# Patient Record
Sex: FEMALE | Race: WHITE | NOT HISPANIC OR LATINO | Employment: FULL TIME | URBAN - NONMETROPOLITAN AREA
[De-identification: names, ages, dates, MRNs, and addresses within clinical notes are randomized per-mention and may not be internally consistent; named-entity substitution may affect disease eponyms.]

---

## 2017-04-01 ENCOUNTER — HOSPITAL ENCOUNTER (EMERGENCY)
Facility: HOSPITAL | Age: 27
Discharge: HOME | End: 2017-04-01
Attending: EMERGENCY MEDICINE | Admitting: EMERGENCY MEDICINE

## 2017-04-01 VITALS
OXYGEN SATURATION: 100 % | SYSTOLIC BLOOD PRESSURE: 115 MMHG | HEIGHT: 66 IN | HEART RATE: 98 BPM | TEMPERATURE: 98.5 F | WEIGHT: 140 LBS | DIASTOLIC BLOOD PRESSURE: 79 MMHG | RESPIRATION RATE: 14 BRPM | BODY MASS INDEX: 22.5 KG/M2

## 2017-04-01 DIAGNOSIS — R74.8 ELEVATED LIVER ENZYMES: ICD-10-CM

## 2017-04-01 DIAGNOSIS — N10 ACUTE PYELONEPHRITIS: Primary | ICD-10-CM

## 2017-04-01 LAB
ALBUMIN SERPL-MCNC: 4.3 G/DL (ref 3.5–5)
ALBUMIN/GLOB SERPL: 1.1 G/DL (ref 1–2)
ALP SERPL-CCNC: 185 U/L (ref 38–126)
ALT SERPL W P-5'-P-CCNC: 327 U/L (ref 13–69)
ANION GAP SERPL CALCULATED.3IONS-SCNC: 14.1 MMOL/L
AST SERPL-CCNC: 203 U/L (ref 15–46)
B-HCG UR QL: NEGATIVE
BACTERIA UR QL AUTO: ABNORMAL /HPF
BASOPHILS # BLD AUTO: 0.04 10*3/MM3 (ref 0–0.2)
BASOPHILS NFR BLD AUTO: 0.3 % (ref 0–2.5)
BILIRUB SERPL-MCNC: 0.9 MG/DL (ref 0.2–1.3)
BILIRUB UR QL STRIP: NEGATIVE
BUN BLD-MCNC: 11 MG/DL (ref 7–20)
BUN/CREAT SERPL: 18.3 (ref 7.1–23.5)
CALCIUM SPEC-SCNC: 9.4 MG/DL (ref 8.4–10.2)
CHLORIDE SERPL-SCNC: 100 MMOL/L (ref 98–107)
CLARITY UR: ABNORMAL
CO2 SERPL-SCNC: 28 MMOL/L (ref 26–30)
COLOR UR: ABNORMAL
CREAT BLD-MCNC: 0.6 MG/DL (ref 0.6–1.3)
DEPRECATED RDW RBC AUTO: 45.7 FL (ref 37–54)
EOSINOPHIL # BLD AUTO: 0.02 10*3/MM3 (ref 0–0.7)
EOSINOPHIL NFR BLD AUTO: 0.2 % (ref 0–7)
ERYTHROCYTE [DISTWIDTH] IN BLOOD BY AUTOMATED COUNT: 14.8 % (ref 11.5–14.5)
GFR SERPL CREATININE-BSD FRML MDRD: 122 ML/MIN/1.73
GLOBULIN UR ELPH-MCNC: 3.8 GM/DL
GLUCOSE BLD-MCNC: 97 MG/DL (ref 74–98)
GLUCOSE UR STRIP-MCNC: ABNORMAL MG/DL
HCT VFR BLD AUTO: 43.6 % (ref 37–47)
HGB BLD-MCNC: 14.4 G/DL (ref 12–16)
HGB UR QL STRIP.AUTO: ABNORMAL
HYALINE CASTS UR QL AUTO: ABNORMAL /LPF
IMM GRANULOCYTES # BLD: 0.05 10*3/MM3 (ref 0–0.06)
IMM GRANULOCYTES NFR BLD: 0.4 % (ref 0–0.6)
KETONES UR QL STRIP: NEGATIVE
LEUKOCYTE ESTERASE UR QL STRIP.AUTO: ABNORMAL
LIPASE SERPL-CCNC: 69 U/L (ref 23–300)
LYMPHOCYTES # BLD AUTO: 2.12 10*3/MM3 (ref 0.6–3.4)
LYMPHOCYTES NFR BLD AUTO: 18.1 % (ref 10–50)
MCH RBC QN AUTO: 27.9 PG (ref 27–31)
MCHC RBC AUTO-ENTMCNC: 33 G/DL (ref 30–37)
MCV RBC AUTO: 84.3 FL (ref 81–99)
MONOCYTES # BLD AUTO: 1.06 10*3/MM3 (ref 0–0.9)
MONOCYTES NFR BLD AUTO: 9 % (ref 0–12)
NEUTROPHILS # BLD AUTO: 8.44 10*3/MM3 (ref 2–6.9)
NEUTROPHILS NFR BLD AUTO: 72 % (ref 37–80)
NITRITE UR QL STRIP: POSITIVE
NRBC BLD MANUAL-RTO: 0 /100 WBC (ref 0–0)
PH UR STRIP.AUTO: <=5 [PH] (ref 5–8)
PLATELET # BLD AUTO: 191 10*3/MM3 (ref 130–400)
PMV BLD AUTO: 10.1 FL (ref 6–12)
POTASSIUM BLD-SCNC: 4.1 MMOL/L (ref 3.5–5.1)
PROT SERPL-MCNC: 8.1 G/DL (ref 6.3–8.2)
PROT UR QL STRIP: ABNORMAL
RBC # BLD AUTO: 5.17 10*6/MM3 (ref 4.2–5.4)
RBC # UR: ABNORMAL /HPF
REF LAB TEST METHOD: ABNORMAL
SODIUM BLD-SCNC: 138 MMOL/L (ref 137–145)
SP GR UR STRIP: <=1.005 (ref 1–1.03)
SQUAMOUS #/AREA URNS HPF: ABNORMAL /HPF
UROBILINOGEN UR QL STRIP: ABNORMAL
WBC CLUMPS # UR AUTO: ABNORMAL /HPF
WBC NRBC COR # BLD: 11.73 10*3/MM3 (ref 4.8–10.8)
WBC UR QL AUTO: ABNORMAL /HPF

## 2017-04-01 PROCEDURE — 87186 SC STD MICRODIL/AGAR DIL: CPT | Performed by: PHYSICIAN ASSISTANT

## 2017-04-01 PROCEDURE — 81001 URINALYSIS AUTO W/SCOPE: CPT | Performed by: PHYSICIAN ASSISTANT

## 2017-04-01 PROCEDURE — 96375 TX/PRO/DX INJ NEW DRUG ADDON: CPT

## 2017-04-01 PROCEDURE — 87077 CULTURE AEROBIC IDENTIFY: CPT | Performed by: PHYSICIAN ASSISTANT

## 2017-04-01 PROCEDURE — 87086 URINE CULTURE/COLONY COUNT: CPT | Performed by: PHYSICIAN ASSISTANT

## 2017-04-01 PROCEDURE — 83690 ASSAY OF LIPASE: CPT | Performed by: PHYSICIAN ASSISTANT

## 2017-04-01 PROCEDURE — 96365 THER/PROPH/DIAG IV INF INIT: CPT

## 2017-04-01 PROCEDURE — 99284 EMERGENCY DEPT VISIT MOD MDM: CPT

## 2017-04-01 PROCEDURE — 96361 HYDRATE IV INFUSION ADD-ON: CPT

## 2017-04-01 PROCEDURE — 80053 COMPREHEN METABOLIC PANEL: CPT | Performed by: PHYSICIAN ASSISTANT

## 2017-04-01 PROCEDURE — 85025 COMPLETE CBC W/AUTO DIFF WBC: CPT | Performed by: PHYSICIAN ASSISTANT

## 2017-04-01 PROCEDURE — 25010000002 KETOROLAC TROMETHAMINE PER 15 MG: Performed by: PHYSICIAN ASSISTANT

## 2017-04-01 PROCEDURE — 25010000002 CEFTRIAXONE: Performed by: PHYSICIAN ASSISTANT

## 2017-04-01 PROCEDURE — 81025 URINE PREGNANCY TEST: CPT | Performed by: PHYSICIAN ASSISTANT

## 2017-04-01 RX ORDER — IBUPROFEN 600 MG/1
600 TABLET ORAL EVERY 6 HOURS PRN
Qty: 20 TABLET | Refills: 0 | Status: ON HOLD | OUTPATIENT
Start: 2017-04-01 | End: 2017-07-02

## 2017-04-01 RX ORDER — NAPROXEN 500 MG/1
500 TABLET ORAL 2 TIMES DAILY PRN
Qty: 14 TABLET | Refills: 0 | Status: SHIPPED | OUTPATIENT
Start: 2017-04-01 | End: 2017-04-01 | Stop reason: HOSPADM

## 2017-04-01 RX ORDER — KETOROLAC TROMETHAMINE 30 MG/ML
60 INJECTION, SOLUTION INTRAMUSCULAR; INTRAVENOUS ONCE
Status: DISCONTINUED | OUTPATIENT
Start: 2017-04-01 | End: 2017-04-01

## 2017-04-01 RX ORDER — KETOROLAC TROMETHAMINE 30 MG/ML
30 INJECTION, SOLUTION INTRAMUSCULAR; INTRAVENOUS ONCE
Status: COMPLETED | OUTPATIENT
Start: 2017-04-01 | End: 2017-04-01

## 2017-04-01 RX ORDER — ONDANSETRON 4 MG/1
4 TABLET, ORALLY DISINTEGRATING ORAL EVERY 6 HOURS PRN
Qty: 10 TABLET | Refills: 0 | Status: ON HOLD | OUTPATIENT
Start: 2017-04-01 | End: 2017-07-02

## 2017-04-01 RX ORDER — CIPROFLOXACIN 500 MG/1
500 TABLET, FILM COATED ORAL EVERY 12 HOURS
Qty: 20 TABLET | Refills: 0 | Status: ON HOLD | OUTPATIENT
Start: 2017-04-01 | End: 2017-07-02

## 2017-04-01 RX ORDER — ACETAMINOPHEN 500 MG
1000 TABLET ORAL ONCE
Status: COMPLETED | OUTPATIENT
Start: 2017-04-01 | End: 2017-04-01

## 2017-04-01 RX ORDER — PHENAZOPYRIDINE HYDROCHLORIDE 200 MG/1
200 TABLET, FILM COATED ORAL 3 TIMES DAILY PRN
Qty: 5 TABLET | Refills: 0 | Status: ON HOLD | OUTPATIENT
Start: 2017-04-01 | End: 2017-07-02

## 2017-04-01 RX ORDER — SODIUM CHLORIDE 0.9 % (FLUSH) 0.9 %
10 SYRINGE (ML) INJECTION AS NEEDED
Status: DISCONTINUED | OUTPATIENT
Start: 2017-04-01 | End: 2017-04-02 | Stop reason: HOSPADM

## 2017-04-01 RX ORDER — ONDANSETRON 4 MG/1
4 TABLET, ORALLY DISINTEGRATING ORAL ONCE
Status: COMPLETED | OUTPATIENT
Start: 2017-04-01 | End: 2017-04-01

## 2017-04-01 RX ADMIN — ONDANSETRON 4 MG: 4 TABLET, ORALLY DISINTEGRATING ORAL at 21:04

## 2017-04-01 RX ADMIN — SODIUM CHLORIDE 1000 ML: 9 INJECTION, SOLUTION INTRAVENOUS at 21:03

## 2017-04-01 RX ADMIN — KETOROLAC TROMETHAMINE 30 MG: 30 INJECTION, SOLUTION INTRAMUSCULAR at 21:11

## 2017-04-01 RX ADMIN — ACETAMINOPHEN 1000 MG: 500 TABLET, COATED ORAL at 21:03

## 2017-04-01 RX ADMIN — CEFTRIAXONE 1 G: 1 INJECTION, POWDER, FOR SOLUTION INTRAMUSCULAR; INTRAVENOUS at 21:55

## 2017-04-02 LAB
HOLD SPECIMEN: NORMAL
WHOLE BLOOD HOLD SPECIMEN: NORMAL

## 2017-04-02 NOTE — ED PROVIDER NOTES
Subjective   HPI Comments: 26-year-old female here with progressively worsening severe, bilateral sharp flank pain for one week.  Radiating to the suprapubic area.  Also has dysuria, frequency, nausea.  No vomiting.  Reported fever to 102.4 earlier today.  Aggravating factors: Urinating.  Alleviating factors: Ibuprofen and Pyridium.  Treatment prior to arrival: Pyridium and ibuprofen.  Remote history of a right kidney stone.  Had pyelonephritis 2 years ago requiring hospitalization.  LMP 3 weeks ago.  Denies pregnancy.  Status post tubal ligation.  No vaginal discharge.      History provided by:  Patient  History limited by: nothing.   used: No        Review of Systems   Constitutional: Positive for chills and fever.   HENT: Negative.    Eyes: Negative.    Respiratory: Negative.    Cardiovascular: Negative.  Negative for chest pain.   Gastrointestinal: Positive for abdominal pain, nausea and vomiting.   Endocrine: Negative.    Genitourinary: Positive for difficulty urinating, dysuria, flank pain and urgency.   Skin: Negative.    Allergic/Immunologic: Negative.    Neurological: Negative.    Hematological: Negative.    Psychiatric/Behavioral: Negative.    All other systems reviewed and are negative.      History reviewed. No pertinent past medical history.    No Known Allergies    Past Surgical History:   Procedure Laterality Date   • CERCLAGE CERVIX     • D&C HYSTEROSCOPY         History reviewed. No pertinent family history.    Social History     Social History   • Marital status: Single     Spouse name: N/A   • Number of children: N/A   • Years of education: N/A     Social History Main Topics   • Smoking status: Current Every Day Smoker   • Smokeless tobacco: None   • Alcohol use No   • Drug use: No   • Sexual activity: Not Asked     Other Topics Concern   • None     Social History Narrative   • None           Objective   Physical Exam   Constitutional: She is oriented to person, place, and  time. She appears well-developed and well-nourished. No distress.   HENT:   Head: Normocephalic and atraumatic.   Right Ear: External ear normal.   Left Ear: External ear normal.   Eyes: EOM are normal. Pupils are equal, round, and reactive to light.   Neck: Normal range of motion. Neck supple.   Cardiovascular: Normal rate, regular rhythm and normal heart sounds.    Pulmonary/Chest: Effort normal and breath sounds normal. No stridor. She has no wheezes. She exhibits no tenderness.   Abdominal: Soft. She exhibits no distension. There is tenderness (suprapubic area is tender to palpation.  Bilateral CVA tenderness, right greater than left.). There is no rebound and no guarding.   Musculoskeletal: Normal range of motion. She exhibits no edema.   Neurological: She is alert and oriented to person, place, and time.   Skin: Skin is warm and dry. No rash noted. She is not diaphoretic.   Psychiatric: She has a normal mood and affect. Her behavior is normal. Judgment and thought content normal.   Nursing note and vitals reviewed.      Procedures         ED Course  ED Course   Comment By Time   Patient's pain is much better at this time.  Clinical history and laboratory evaluation is suggestive of pyelonephritis.  Liver enzymes are noted to be elevated.  The patient does state that she drinks a moderate amount of alcohol.  No IV drug use.  I explained toher that she will need to see her doctor for workup of her elevated liver enzymes.  Stop drinking, wean off alcohol.  I explained to her that her family doctor may need to check her for hepatitis.  She understands that she needs a workup through her regular doctor. Codi Moncada PA-C 04/01 2206                  MDM  Number of Diagnoses or Management Options  Acute pyelonephritis: new and requires workup  Elevated liver enzymes: new and requires workup     Amount and/or Complexity of Data Reviewed  Clinical lab tests: reviewed and ordered  Discuss the patient with other  providers: yes    Risk of Complications, Morbidity, and/or Mortality  Presenting problems: moderate  Diagnostic procedures: low  Management options: moderate    Patient Progress  Patient progress: stable      Final diagnoses:   Acute pyelonephritis   Elevated liver enzymes            Codi Moncada PA-C  04/01/17 2805

## 2017-04-02 NOTE — DISCHARGE INSTRUCTIONS
Increase fluids, lots of cranberry juice.    Return to the ER for uncontrolled vomiting, new or worsening symptoms.  Wean yourself off alcohol.  Follow-up with your doctor in 2-3 days for recheck of your liver enzymes and for workup as to why they're elevated.  If you do not have a family DrZhane, follow-up with the [place] clinic.  Call for appointment.

## 2017-04-04 LAB — BACTERIA SPEC AEROBE CULT: ABNORMAL

## 2017-07-01 ENCOUNTER — HOSPITAL ENCOUNTER (EMERGENCY)
Facility: HOSPITAL | Age: 27
Discharge: PSYCHIATRIC OR SUBSTANCE USE FACILITY | End: 2017-07-02
Attending: EMERGENCY MEDICINE | Admitting: EMERGENCY MEDICINE

## 2017-07-01 DIAGNOSIS — T40.1X2A INTENTIONAL HEROIN OVERDOSE, INITIAL ENCOUNTER (HCC): Primary | ICD-10-CM

## 2017-07-01 DIAGNOSIS — T14.91XA SUICIDE ATTEMPT (HCC): ICD-10-CM

## 2017-07-01 LAB
ALBUMIN SERPL-MCNC: 4.8 G/DL (ref 3.5–5)
ALBUMIN/GLOB SERPL: 1.3 G/DL (ref 1–2)
ALP SERPL-CCNC: 75 U/L (ref 38–126)
ALT SERPL W P-5'-P-CCNC: 45 U/L (ref 13–69)
ANION GAP SERPL CALCULATED.3IONS-SCNC: 15.1 MMOL/L
APAP SERPL-MCNC: <10 MCG/ML
AST SERPL-CCNC: 48 U/L (ref 15–46)
BASOPHILS # BLD AUTO: 0.04 10*3/MM3 (ref 0–0.2)
BASOPHILS NFR BLD AUTO: 0.4 % (ref 0–2.5)
BILIRUB SERPL-MCNC: 1.1 MG/DL (ref 0.2–1.3)
BUN BLD-MCNC: 20 MG/DL (ref 7–20)
BUN/CREAT SERPL: 22.2 (ref 7.1–23.5)
CALCIUM SPEC-SCNC: 10 MG/DL (ref 8.4–10.2)
CHLORIDE SERPL-SCNC: 102 MMOL/L (ref 98–107)
CO2 SERPL-SCNC: 27 MMOL/L (ref 26–30)
CREAT BLD-MCNC: 0.9 MG/DL (ref 0.6–1.3)
DEPRECATED RDW RBC AUTO: 40.8 FL (ref 37–54)
EOSINOPHIL # BLD AUTO: 0.06 10*3/MM3 (ref 0–0.7)
EOSINOPHIL NFR BLD AUTO: 0.6 % (ref 0–7)
ERYTHROCYTE [DISTWIDTH] IN BLOOD BY AUTOMATED COUNT: 13.3 % (ref 11.5–14.5)
ETHANOL BLD-MCNC: <10 MG/DL
ETHANOL UR QL: <0.01 %
GFR SERPL CREATININE-BSD FRML MDRD: 76 ML/MIN/1.73
GLOBULIN UR ELPH-MCNC: 3.6 GM/DL
GLUCOSE BLD-MCNC: 126 MG/DL (ref 74–98)
HCT VFR BLD AUTO: 44.4 % (ref 37–47)
HGB BLD-MCNC: 14.7 G/DL (ref 12–16)
IMM GRANULOCYTES # BLD: 0.03 10*3/MM3 (ref 0–0.06)
IMM GRANULOCYTES NFR BLD: 0.3 % (ref 0–0.6)
LYMPHOCYTES # BLD AUTO: 4.23 10*3/MM3 (ref 0.6–3.4)
LYMPHOCYTES NFR BLD AUTO: 42.9 % (ref 10–50)
MAGNESIUM SERPL-MCNC: 2.1 MG/DL (ref 1.6–2.3)
MCH RBC QN AUTO: 27.8 PG (ref 27–31)
MCHC RBC AUTO-ENTMCNC: 33.1 G/DL (ref 30–37)
MCV RBC AUTO: 83.9 FL (ref 81–99)
MONOCYTES # BLD AUTO: 0.89 10*3/MM3 (ref 0–0.9)
MONOCYTES NFR BLD AUTO: 9 % (ref 0–12)
NEUTROPHILS # BLD AUTO: 4.6 10*3/MM3 (ref 2–6.9)
NEUTROPHILS NFR BLD AUTO: 46.8 % (ref 37–80)
NRBC BLD MANUAL-RTO: 0 /100 WBC (ref 0–0)
PLATELET # BLD AUTO: 256 10*3/MM3 (ref 130–400)
PMV BLD AUTO: 9.7 FL (ref 6–12)
POTASSIUM BLD-SCNC: 3.1 MMOL/L (ref 3.5–5.1)
PROT SERPL-MCNC: 8.4 G/DL (ref 6.3–8.2)
RBC # BLD AUTO: 5.29 10*6/MM3 (ref 4.2–5.4)
SALICYLATES SERPL-MCNC: <1 MG/DL (ref 2.8–20)
SODIUM BLD-SCNC: 141 MMOL/L (ref 137–145)
WBC NRBC COR # BLD: 9.85 10*3/MM3 (ref 4.8–10.8)

## 2017-07-01 PROCEDURE — 85025 COMPLETE CBC W/AUTO DIFF WBC: CPT | Performed by: EMERGENCY MEDICINE

## 2017-07-01 PROCEDURE — 80307 DRUG TEST PRSMV CHEM ANLYZR: CPT

## 2017-07-01 PROCEDURE — 80053 COMPREHEN METABOLIC PANEL: CPT | Performed by: EMERGENCY MEDICINE

## 2017-07-01 PROCEDURE — 80307 DRUG TEST PRSMV CHEM ANLYZR: CPT | Performed by: EMERGENCY MEDICINE

## 2017-07-01 PROCEDURE — 99285 EMERGENCY DEPT VISIT HI MDM: CPT

## 2017-07-01 PROCEDURE — 83735 ASSAY OF MAGNESIUM: CPT

## 2017-07-02 ENCOUNTER — HOSPITAL ENCOUNTER (INPATIENT)
Facility: HOSPITAL | Age: 27
LOS: 4 days | Discharge: HOME | End: 2017-07-06
Attending: PSYCHIATRY & NEUROLOGY | Admitting: PSYCHIATRY & NEUROLOGY

## 2017-07-02 VITALS
DIASTOLIC BLOOD PRESSURE: 72 MMHG | OXYGEN SATURATION: 97 % | TEMPERATURE: 98.4 F | SYSTOLIC BLOOD PRESSURE: 113 MMHG | RESPIRATION RATE: 22 BRPM | WEIGHT: 140 LBS | HEART RATE: 82 BPM | BODY MASS INDEX: 22.5 KG/M2 | HEIGHT: 66 IN

## 2017-07-02 PROBLEM — F32.2 SEVERE MAJOR DEPRESSION (HCC): Status: ACTIVE | Noted: 2017-07-02

## 2017-07-02 LAB
AMPHET+METHAMPHET UR QL: POSITIVE
AMPHETAMINES UR QL: POSITIVE
B-HCG UR QL: NEGATIVE
BACTERIA UR QL AUTO: ABNORMAL /HPF
BARBITURATES UR QL SCN: NEGATIVE
BENZODIAZ UR QL SCN: NEGATIVE
BILIRUB UR QL STRIP: NEGATIVE
BUPRENORPHINE SERPL-MCNC: NEGATIVE NG/ML
CANNABINOIDS SERPL QL: POSITIVE
CLARITY UR: ABNORMAL
COCAINE UR QL: NEGATIVE
COLOR UR: YELLOW
GLUCOSE UR STRIP-MCNC: NEGATIVE MG/DL
HGB UR QL STRIP.AUTO: ABNORMAL
HYALINE CASTS UR QL AUTO: ABNORMAL /LPF
KETONES UR QL STRIP: ABNORMAL
LEUKOCYTE ESTERASE UR QL STRIP.AUTO: ABNORMAL
METHADONE UR QL SCN: NEGATIVE
MUCOUS THREADS URNS QL MICRO: ABNORMAL /HPF
NITRITE UR QL STRIP: POSITIVE
OPIATES UR QL: NEGATIVE
OXYCODONE UR QL SCN: NEGATIVE
PCP UR QL SCN: NEGATIVE
PH UR STRIP.AUTO: 5.5 [PH] (ref 5–8)
POTASSIUM BLD-SCNC: 4.5 MMOL/L (ref 3.5–5.1)
PROPOXYPH UR QL: NEGATIVE
PROT UR QL STRIP: ABNORMAL
RBC # UR: ABNORMAL /HPF
REF LAB TEST METHOD: ABNORMAL
SP GR UR STRIP: >=1.03 (ref 1–1.03)
SQUAMOUS #/AREA URNS HPF: ABNORMAL /HPF
TRICYCLICS UR QL SCN: NEGATIVE
UROBILINOGEN UR QL STRIP: ABNORMAL
WBC UR QL AUTO: ABNORMAL /HPF

## 2017-07-02 PROCEDURE — 25010000002 ONDANSETRON PER 1 MG: Performed by: EMERGENCY MEDICINE

## 2017-07-02 PROCEDURE — HZ2ZZZZ DETOXIFICATION SERVICES FOR SUBSTANCE ABUSE TREATMENT: ICD-10-PCS | Performed by: PSYCHIATRY & NEUROLOGY

## 2017-07-02 PROCEDURE — 81025 URINE PREGNANCY TEST: CPT | Performed by: EMERGENCY MEDICINE

## 2017-07-02 PROCEDURE — 36415 COLL VENOUS BLD VENIPUNCTURE: CPT

## 2017-07-02 PROCEDURE — 96374 THER/PROPH/DIAG INJ IV PUSH: CPT

## 2017-07-02 PROCEDURE — 81001 URINALYSIS AUTO W/SCOPE: CPT | Performed by: EMERGENCY MEDICINE

## 2017-07-02 PROCEDURE — 96375 TX/PRO/DX INJ NEW DRUG ADDON: CPT

## 2017-07-02 PROCEDURE — 96361 HYDRATE IV INFUSION ADD-ON: CPT

## 2017-07-02 PROCEDURE — 87186 SC STD MICRODIL/AGAR DIL: CPT | Performed by: EMERGENCY MEDICINE

## 2017-07-02 PROCEDURE — 84132 ASSAY OF SERUM POTASSIUM: CPT | Performed by: EMERGENCY MEDICINE

## 2017-07-02 PROCEDURE — 80306 DRUG TEST PRSMV INSTRMNT: CPT | Performed by: EMERGENCY MEDICINE

## 2017-07-02 PROCEDURE — 87077 CULTURE AEROBIC IDENTIFY: CPT | Performed by: EMERGENCY MEDICINE

## 2017-07-02 PROCEDURE — 87086 URINE CULTURE/COLONY COUNT: CPT | Performed by: EMERGENCY MEDICINE

## 2017-07-02 PROCEDURE — 25010000002 LORAZEPAM PER 2 MG: Performed by: EMERGENCY MEDICINE

## 2017-07-02 RX ORDER — FAMOTIDINE 20 MG/1
20 TABLET, FILM COATED ORAL 2 TIMES DAILY PRN
Status: DISCONTINUED | OUTPATIENT
Start: 2017-07-02 | End: 2017-07-06 | Stop reason: HOSPADM

## 2017-07-02 RX ORDER — CLONIDINE HYDROCHLORIDE 0.1 MG/1
0.1 TABLET ORAL 4 TIMES DAILY PRN
Status: ACTIVE | OUTPATIENT
Start: 2017-07-02 | End: 2017-07-03

## 2017-07-02 RX ORDER — BENZONATATE 100 MG/1
100 CAPSULE ORAL 3 TIMES DAILY PRN
Status: DISCONTINUED | OUTPATIENT
Start: 2017-07-02 | End: 2017-07-06 | Stop reason: HOSPADM

## 2017-07-02 RX ORDER — LORAZEPAM 2 MG/ML
1 INJECTION INTRAMUSCULAR ONCE
Status: COMPLETED | OUTPATIENT
Start: 2017-07-02 | End: 2017-07-02

## 2017-07-02 RX ORDER — CLONIDINE HYDROCHLORIDE 0.1 MG/1
0.1 TABLET ORAL 2 TIMES DAILY PRN
Status: ACTIVE | OUTPATIENT
Start: 2017-07-05 | End: 2017-07-06

## 2017-07-02 RX ORDER — SODIUM CHLORIDE, SODIUM LACTATE, POTASSIUM CHLORIDE, CALCIUM CHLORIDE 600; 310; 30; 20 MG/100ML; MG/100ML; MG/100ML; MG/100ML
1000 INJECTION, SOLUTION INTRAVENOUS ONCE
Status: COMPLETED | OUTPATIENT
Start: 2017-07-02 | End: 2017-07-02

## 2017-07-02 RX ORDER — CLONIDINE HYDROCHLORIDE 0.1 MG/1
0.1 TABLET ORAL 4 TIMES DAILY PRN
Status: ACTIVE | OUTPATIENT
Start: 2017-07-03 | End: 2017-07-04

## 2017-07-02 RX ORDER — ACETAMINOPHEN 325 MG/1
650 TABLET ORAL EVERY 4 HOURS PRN
Status: DISCONTINUED | OUTPATIENT
Start: 2017-07-02 | End: 2017-07-06 | Stop reason: HOSPADM

## 2017-07-02 RX ORDER — CYCLOBENZAPRINE HCL 10 MG
10 TABLET ORAL 3 TIMES DAILY PRN
Status: DISCONTINUED | OUTPATIENT
Start: 2017-07-02 | End: 2017-07-06 | Stop reason: HOSPADM

## 2017-07-02 RX ORDER — ONDANSETRON 4 MG/1
4 TABLET, FILM COATED ORAL EVERY 6 HOURS PRN
Status: DISCONTINUED | OUTPATIENT
Start: 2017-07-02 | End: 2017-07-06 | Stop reason: HOSPADM

## 2017-07-02 RX ORDER — POTASSIUM CHLORIDE 750 MG/1
40 CAPSULE, EXTENDED RELEASE ORAL ONCE
Status: COMPLETED | OUTPATIENT
Start: 2017-07-02 | End: 2017-07-02

## 2017-07-02 RX ORDER — ECHINACEA PURPUREA EXTRACT 125 MG
2 TABLET ORAL AS NEEDED
Status: DISCONTINUED | OUTPATIENT
Start: 2017-07-02 | End: 2017-07-06 | Stop reason: HOSPADM

## 2017-07-02 RX ORDER — BENZTROPINE MESYLATE 1 MG/ML
0.5 INJECTION INTRAMUSCULAR; INTRAVENOUS DAILY PRN
Status: DISCONTINUED | OUTPATIENT
Start: 2017-07-02 | End: 2017-07-06 | Stop reason: HOSPADM

## 2017-07-02 RX ORDER — TRAZODONE HYDROCHLORIDE 50 MG/1
100 TABLET ORAL NIGHTLY PRN
Status: DISCONTINUED | OUTPATIENT
Start: 2017-07-02 | End: 2017-07-06 | Stop reason: HOSPADM

## 2017-07-02 RX ORDER — CLONIDINE HYDROCHLORIDE 0.1 MG/1
0.1 TABLET ORAL ONCE AS NEEDED
Status: DISCONTINUED | OUTPATIENT
Start: 2017-07-06 | End: 2017-07-06 | Stop reason: HOSPADM

## 2017-07-02 RX ORDER — GRANULES FOR ORAL 3 G/1
3 POWDER ORAL ONCE
Status: COMPLETED | OUTPATIENT
Start: 2017-07-02 | End: 2017-07-02

## 2017-07-02 RX ORDER — ALUMINA, MAGNESIA, AND SIMETHICONE 2400; 2400; 240 MG/30ML; MG/30ML; MG/30ML
15 SUSPENSION ORAL EVERY 6 HOURS PRN
Status: DISCONTINUED | OUTPATIENT
Start: 2017-07-02 | End: 2017-07-06 | Stop reason: HOSPADM

## 2017-07-02 RX ORDER — BENZTROPINE MESYLATE 1 MG/1
1 TABLET ORAL DAILY PRN
Status: DISCONTINUED | OUTPATIENT
Start: 2017-07-02 | End: 2017-07-06 | Stop reason: HOSPADM

## 2017-07-02 RX ORDER — DICYCLOMINE HYDROCHLORIDE 10 MG/1
10 CAPSULE ORAL 3 TIMES DAILY PRN
Status: DISCONTINUED | OUTPATIENT
Start: 2017-07-02 | End: 2017-07-06 | Stop reason: HOSPADM

## 2017-07-02 RX ORDER — LOPERAMIDE HYDROCHLORIDE 2 MG/1
2 CAPSULE ORAL 4 TIMES DAILY PRN
Status: DISCONTINUED | OUTPATIENT
Start: 2017-07-02 | End: 2017-07-06 | Stop reason: HOSPADM

## 2017-07-02 RX ORDER — ONDANSETRON 2 MG/ML
4 INJECTION INTRAMUSCULAR; INTRAVENOUS ONCE
Status: COMPLETED | OUTPATIENT
Start: 2017-07-02 | End: 2017-07-02

## 2017-07-02 RX ORDER — NICOTINE 21 MG/24HR
1 PATCH, TRANSDERMAL 24 HOURS TRANSDERMAL DAILY
Status: DISCONTINUED | OUTPATIENT
Start: 2017-07-02 | End: 2017-07-06 | Stop reason: HOSPADM

## 2017-07-02 RX ORDER — CLONIDINE HYDROCHLORIDE 0.1 MG/1
0.1 TABLET ORAL 3 TIMES DAILY PRN
Status: ACTIVE | OUTPATIENT
Start: 2017-07-04 | End: 2017-07-05

## 2017-07-02 RX ADMIN — POTASSIUM CHLORIDE 40 MEQ: 750 CAPSULE, EXTENDED RELEASE ORAL at 02:50

## 2017-07-02 RX ADMIN — FOSFOMYCIN TROMETHAMINE 3 G: 3 POWDER ORAL at 02:50

## 2017-07-02 RX ADMIN — SODIUM CHLORIDE, POTASSIUM CHLORIDE, SODIUM LACTATE AND CALCIUM CHLORIDE 1000 ML: 600; 310; 30; 20 INJECTION, SOLUTION INTRAVENOUS at 04:10

## 2017-07-02 RX ADMIN — LORAZEPAM 1 MG: 2 INJECTION INTRAMUSCULAR; INTRAVENOUS at 03:58

## 2017-07-02 RX ADMIN — ONDANSETRON 4 MG: 2 INJECTION INTRAMUSCULAR; INTRAVENOUS at 03:58

## 2017-07-02 RX ADMIN — TRAZODONE HYDROCHLORIDE 100 MG: 50 TABLET ORAL at 22:09

## 2017-07-02 NOTE — ED NOTES
Brief Note:    Day-shift nurse will be advised to contact [facility] at approximately 9 o clock with vitals and potassium for the patient. Assessment is complete and labs have been faxed.     Contact Lead nurse @ [phone] ext. [phone]    Contact Marcia if there are any problems; please utilize the assessment phone.      MONSE Curry  07/02/17 0233

## 2017-07-02 NOTE — PLAN OF CARE
Problem: BH Patient Care Overview (Adult)  Goal: Plan of Care Review  Outcome: Ongoing (interventions implemented as appropriate)        Pt new direct admit from [facility] in [place]. Pt attempted suicide by over dosing on heroin. Rates A/D1/10. Reports feeling helpless, hopeless and worthless. Denies HI or hallucination. Reports poor sleep. Reports that she has not slept in 3-4 days. Reports poor appetite. Stressors are poor relationship with her family, loss of custody of her children.    07/02/17 1347   Coping/Psychosocial Response Interventions   Plan Of Care Reviewed With patient   Coping/Psychosocial   Patient Agreement with Plan of Care agrees   Patient Care Overview   Progress no change          Goal: Interdisciplinary Rounds/Family Conference  Outcome: Ongoing (interventions implemented as appropriate)  Goal: Individualization and Mutuality  Outcome: Ongoing (interventions implemented as appropriate)  Goal: Discharge Needs Assessment  Outcome: Ongoing (interventions implemented as appropriate)    Problem:  Overarching Goals  Goal: Adheres to Safety Considerations for Self and Others  Outcome: Ongoing (interventions implemented as appropriate)  Goal: Optimized Coping Skills in Response to Life Stressors  Outcome: Ongoing (interventions implemented as appropriate)  Goal: Develops/Participates in Therapeutic [place] to Support Successful Transition  Outcome: Ongoing (interventions implemented as appropriate)

## 2017-07-02 NOTE — ED PROVIDER NOTES
"Subjective   HPI Comments: 26-year-old female presenting with intentional heroin overdose.  She states that she has been \"feeling suicidal \"for some time.  She had planned on checking herself in tomorrow for psychiatric evaluation.  Tonight after work she tried to end her life by taking too much heroin.  She was found unresponsive by bystanders, barely received 2 mg of Narcan and a brief round of CPR.  When EMS found her she was awake and alert.  She has no complaints at this time including chest pain, shortness of breath.  She also admits to using methamphetamine.      Review of Systems   Constitutional: Negative for chills and fever.   HENT: Negative for congestion, rhinorrhea and sore throat.    Eyes: Negative for pain.   Respiratory: Negative for cough and shortness of breath.    Cardiovascular: Negative for chest pain, palpitations and leg swelling.   Gastrointestinal: Negative for abdominal pain, diarrhea, nausea and vomiting.   Genitourinary: Negative for dysuria.   Musculoskeletal: Negative for arthralgias.   Skin: Negative for rash.   Neurological: Negative for weakness and numbness.   Psychiatric/Behavioral: Positive for dysphoric mood, self-injury and suicidal ideas. Negative for behavioral problems and decreased concentration.       History reviewed. No pertinent past medical history.    Allergies   Allergen Reactions   • Atarax [Hydroxyzine] GI Intolerance       Past Surgical History:   Procedure Laterality Date   • CERCLAGE CERVIX     • D&C HYSTEROSCOPY         History reviewed. No pertinent family history.    Social History     Social History   • Marital status: Single     Spouse name: N/A   • Number of children: N/A   • Years of education: N/A     Social History Main Topics   • Smoking status: Current Every Day Smoker   • Smokeless tobacco: None   • Alcohol use No   • Drug use: Yes     Special: Methamphetamines   • Sexual activity: Not Asked     Other Topics Concern   • None     Social History " Narrative   • None           Objective   Physical Exam   Constitutional: She is oriented to person, place, and time. She appears well-developed and well-nourished. No distress.   HENT:   Head: Normocephalic and atraumatic.   Right Ear: External ear normal.   Left Ear: External ear normal.   Nose: Nose normal.   Mouth/Throat: Oropharynx is clear and moist.   Eyes: Conjunctivae and EOM are normal. Pupils are equal, round, and reactive to light.   Neck: Normal range of motion. Neck supple.   Cardiovascular: Regular rhythm, normal heart sounds and intact distal pulses.    Tachycardic   Pulmonary/Chest: Effort normal and breath sounds normal. No respiratory distress.   Abdominal: Soft. Bowel sounds are normal. She exhibits no distension. There is no tenderness. There is no rebound and no guarding.   Musculoskeletal: Normal range of motion. She exhibits no edema, tenderness or deformity.   Neurological: She is alert and oriented to person, place, and time.   Skin: Skin is warm and dry. No rash noted.   Psychiatric:   Depressed, tearful   Nursing note and vitals reviewed.      Procedures         ED Course  ED Course                  MDM  Number of Diagnoses or Management Options  Intentional heroin overdose, initial encounter:   Suicide attempt:   Diagnosis management comments: 26-year-old female with intentional overdose of heroin.  Well-developed, well-nourished young female in no distress though is obviously tearful and anxious.  We'll check labs, monitor for relapse of symptoms.  We'll consult behavioral health.  Disposition pending observation and consultation.    DDX: Intentional overdose, depression, polysubstance abuse, suicide attempt    EKG: Sinus tachycardia, normal axis/intervals, no acute ST changes    Workup here notable for mild hypokalemia, UTI.  We'll replace potassium orally, we'll treat UTI with one dose of fosfomycin.  She was initially tachycardic during her stay here, this is likely due to unmasked  amphetamine.  She's calming down now and her heart rate is improving.  She has been accepted at [facility] in [place] pending stable improved vital signs.  They would like us to reevaluate first thing in the morning.  We'll monitor until then.      Final diagnoses:   Intentional heroin overdose, initial encounter   Suicide attempt               Billy Begum MD  07/02/17 0229
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
## 2017-07-02 NOTE — ED NOTES
Linda from [facility] calls for an update, speaks with pt. Will call back when MD accepts pt and she can take report.     Jaiden Subramanian RN  07/02/17 9479

## 2017-07-02 NOTE — ED NOTES
Attempted to call report to [facility]. Was told by REMINGTON Mckeon that pt had not been accepted there and that our mental health specialist would have to resubmit request for admission. It was given to me in 0700 report that pt had been accepted and I was to call report at 0900. Izzy from  and Dr Heath made aware.     Jaiden Subramanian RN  07/02/17 0908

## 2017-07-03 PROCEDURE — 99223 1ST HOSP IP/OBS HIGH 75: CPT | Performed by: PSYCHIATRY & NEUROLOGY

## 2017-07-03 RX ADMIN — CYCLOBENZAPRINE HYDROCHLORIDE 10 MG: 10 TABLET, FILM COATED ORAL at 21:12

## 2017-07-03 RX ADMIN — NICOTINE 1 PATCH: 21 PATCH TRANSDERMAL at 14:15

## 2017-07-03 RX ADMIN — TRAZODONE HYDROCHLORIDE 100 MG: 50 TABLET ORAL at 21:12

## 2017-07-03 NOTE — PLAN OF CARE
Problem:  Patient Care Overview (Adult)  Goal: Plan of Care Review  Outcome: Ongoing (interventions implemented as appropriate)  Admitted from [facility] following intentional overdose. Rated anxiety as 3 and depression as 9. Says she does have S.I., but has no plans to act on thoughts here on unit. Has slept most of shift.    07/03/17 0532   Coping/Psychosocial Response Interventions   Plan Of Care Reviewed With patient   Coping/Psychosocial   Patient Agreement with Plan of Care agrees   Patient Care Overview   Progress no change         Problem:  Overarching Goals  Goal: Adheres to Safety Considerations for Self and Others  Outcome: Ongoing (interventions implemented as appropriate)  Goal: Optimized Coping Skills in Response to Life Stressors  Outcome: Ongoing (interventions implemented as appropriate)  Goal: Develops/Participates in Therapeutic [place] to Support Successful Transition  Outcome: Ongoing (interventions implemented as appropriate)

## 2017-07-03 NOTE — PLAN OF CARE
Problem: BH Patient Care Overview (Adult)  Goal: Plan of Care Review  Outcome: Ongoing (interventions implemented as appropriate)    07/03/17 1730   Coping/Psychosocial Response Interventions   Plan Of Care Reviewed With patient   Coping/Psychosocial   Patient Agreement with Plan of Care agrees   Patient Care Overview   Progress no change   Outcome Evaluation   Outcome Summary/Follow up Plan In room most of today. Denies withdrawals/craving and si/hi.         Problem:  Overarching Goals  Goal: Adheres to Safety Considerations for Self and Others  Outcome: Ongoing (interventions implemented as appropriate)  Goal: Optimized Coping Skills in Response to Life Stressors  Outcome: Ongoing (interventions implemented as appropriate)  Goal: Develops/Participates in Therapeutic [place] to Support Successful Transition  Outcome: Ongoing (interventions implemented as appropriate)
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
## 2017-07-04 LAB — BACTERIA SPEC AEROBE CULT: ABNORMAL

## 2017-07-04 PROCEDURE — 99232 SBSQ HOSP IP/OBS MODERATE 35: CPT | Performed by: PSYCHIATRY & NEUROLOGY

## 2017-07-04 RX ADMIN — SERTRALINE 50 MG: 50 TABLET, FILM COATED ORAL at 10:58

## 2017-07-04 RX ADMIN — TRAZODONE HYDROCHLORIDE 100 MG: 50 TABLET ORAL at 21:32

## 2017-07-04 RX ADMIN — NICOTINE 1 PATCH: 21 PATCH TRANSDERMAL at 10:59

## 2017-07-04 RX ADMIN — CYCLOBENZAPRINE HYDROCHLORIDE 10 MG: 10 TABLET, FILM COATED ORAL at 11:53

## 2017-07-04 RX ADMIN — CYCLOBENZAPRINE HYDROCHLORIDE 10 MG: 10 TABLET, FILM COATED ORAL at 21:32

## 2017-07-04 NOTE — PROGRESS NOTES
"      PROGRESS NOTE  Clinician: SHAY Pearce MD  Admission Date: 7/2/2017  9:18 AM 07/04/17    Behavioral Health Treatment Plan and Problem List: I have reviewed and approved the Behavioral Health Treatment Plan and Problem list.    Allergies  Allergies   Allergen Reactions   • Atarax [Hydroxyzine] GI Intolerance       Hospital Day: 2 days      Assessment completed within view of staff    History    CC:\"Better\"    Followed for: depression/ Polysubstance Dependency.    Interval HPI: Patient seen and evaluated by me.  Chart reviewed.  Depressed and feeling hopeless but no active suicide intent. Slept well. Agreeable with starting an antidepressant, Zoloft has helped in the past. Also interested the IOP in [place]. 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 Required 1 year document absence, NA, random UDS's, before she can reapply. The IOP would be a start.   Sounds like she is ready to initiate a recovery effort.   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   Individual psychotherapy should also be included in the treatment plans.   Will try to set all this in motion tomorrow aiming to discharge Thursday.     Interval Review of Systems:   General ROS: negative for - fever or malaise  Endocrine ROS: negative for - palpitations  Respiratory ROS: no cough, shortness of breath, or wheezing  Cardiovascular ROS: no chest pain or dyspnea on exertion  Gastrointestinal ROS: no abdominal pain,no black or bloody stools    BP 99/57 (BP Location: Right arm, Patient Position: Lying)  Pulse 67  Temp 97.6 °F (36.4 °C) (Temporal Artery )   Resp 18  SpO2 98%    Mental Status Exam    Mood/Affect: depressed  Thought Processes:  linear, logical, and goal directed  Thought Content:  Negativistic  Hallucination(s): none  Hopelessness: yes  Optimistic:No  Suicidal Thoughts:  none  Suicidal Plan/Intent: none  Homicidal Thoughts:  absent      Medical " Decision Making:   All recent completed LAB results reviewed and discussed with the patient.        Radiology:     Imaging Results (last 24 hours)     ** No results found for the last 24 hours. **            EKG:     ECG/EMG Results (most recent)     None           Medications:     nicotine 1 patch Transdermal Daily       •  acetaminophen  •  aluminum-magnesium hydroxide-simethicone  •  benzonatate  •  benztropine **OR** benztropine  •  [] CloNIDine **FOLLOWED BY** CloNIDine **FOLLOWED BY** [START ON 2017] CloNIDine **FOLLOWED BY** [START ON 2017] CloNIDine  •  cyclobenzaprine  •  dicyclomine  •  famotidine  •  loperamide  •  magnesium hydroxide  •  ondansetron  •  sodium chloride  •  traZODone   All medications reviewed.    Special Precautions: Continue current level of Special Precautions.    Assessment/Diagnosis: Active Problems:    Severe major depression      Treatment Plan: Continue current treatment plan, see HPI.     Attestation:   Physician Attestation: I attest that the above note accurately reflects work and decisions made by me.

## 2017-07-04 NOTE — PLAN OF CARE
Problem: BH Patient Care Overview (Adult)  Goal: Plan of Care Review  Outcome: Ongoing (interventions implemented as appropriate)    07/03/17 5552   Coping/Psychosocial Response Interventions   Plan Of Care Reviewed With patient   Coping/Psychosocial   Patient Agreement with Plan of Care agrees   Patient Care Overview   Progress no change   Outcome Evaluation   Outcome Summary/Follow up Plan PT RATED ANXIETY 2, DEPRESSION 6, DENIED SI/HI/HALLUCINATIONS, DENIED WITHDRAWLS & CRAVINGS, COWS 1.         Problem:  Overarching Goals  Goal: Adheres to Safety Considerations for Self and Others  Outcome: Ongoing (interventions implemented as appropriate)  Goal: Optimized Coping Skills in Response to Life Stressors  Outcome: Ongoing (interventions implemented as appropriate)  Goal: Develops/Participates in Therapeutic [place] to Support Successful Transition  Outcome: Ongoing (interventions implemented as appropriate)

## 2017-07-04 NOTE — PLAN OF CARE
Problem:  Patient Care Overview (Adult)  Goal: Plan of Care Review  Outcome: Ongoing (interventions implemented as appropriate)    07/04/17 1425   Coping/Psychosocial Response Interventions   Plan Of Care Reviewed With patient   Coping/Psychosocial   Patient Agreement with Plan of Care agrees   Patient Care Overview   Progress no change   Outcome Evaluation   Outcome Summary/Follow up Plan Quiet day. Denies si/hi.         Problem:  Overarching Goals  Goal: Adheres to Safety Considerations for Self and Others  Outcome: Ongoing (interventions implemented as appropriate)  Goal: Optimized Coping Skills in Response to Life Stressors  Outcome: Ongoing (interventions implemented as appropriate)  Goal: Develops/Participates in Therapeutic [place] to Support Successful Transition  Outcome: Ongoing (interventions implemented as appropriate)

## 2017-07-05 PROCEDURE — 99232 SBSQ HOSP IP/OBS MODERATE 35: CPT | Performed by: PSYCHIATRY & NEUROLOGY

## 2017-07-05 RX ADMIN — NICOTINE 1 PATCH: 21 PATCH TRANSDERMAL at 08:17

## 2017-07-05 RX ADMIN — TRAZODONE HYDROCHLORIDE 100 MG: 50 TABLET ORAL at 21:30

## 2017-07-05 RX ADMIN — CYCLOBENZAPRINE HYDROCHLORIDE 10 MG: 10 TABLET, FILM COATED ORAL at 15:38

## 2017-07-05 RX ADMIN — CYCLOBENZAPRINE HYDROCHLORIDE 10 MG: 10 TABLET, FILM COATED ORAL at 08:17

## 2017-07-05 RX ADMIN — SERTRALINE 50 MG: 50 TABLET, FILM COATED ORAL at 08:17

## 2017-07-05 NOTE — PLAN OF CARE
Problem:  Patient Care Overview (Adult)  Goal: Plan of Care Review  Outcome: Ongoing (interventions implemented as appropriate)    07/05/17 1635   Coping/Psychosocial Response Interventions   Plan Of Care Reviewed With patient   Coping/Psychosocial   Patient Agreement with Plan of Care agrees   Patient Care Overview   Progress no change   Outcome Evaluation   Outcome Summary/Follow up Plan Quiet. Stays in her room mostly. Denies withdrawals.         Problem:  Overarching Goals  Goal: Adheres to Safety Considerations for Self and Others  Outcome: Ongoing (interventions implemented as appropriate)  Goal: Optimized Coping Skills in Response to Life Stressors  Outcome: Ongoing (interventions implemented as appropriate)  Goal: Develops/Participates in Therapeutic [place] to Support Successful Transition  Outcome: Ongoing (interventions implemented as appropriate)

## 2017-07-05 NOTE — PLAN OF CARE
Problem: BH Patient Care Overview (Adult)  Goal: Plan of Care Review  Outcome: Ongoing (interventions implemented as appropriate)    07/05/17 0125   Coping/Psychosocial Response Interventions   Plan Of Care Reviewed With patient   Coping/Psychosocial   Patient Agreement with Plan of Care agrees   Patient Care Overview   Progress no change   Outcome Evaluation   Outcome Summary/Follow up Plan PT RATED ANXIETY 1, DEPRESSION 5, DENIED SI/HI/HALLUCINATIONS, REPORTED NO WITHDRAWLS, CRAVING 5 FOR OPIATES, COWS 1.         Problem:  Overarching Goals  Goal: Adheres to Safety Considerations for Self and Others  Outcome: Ongoing (interventions implemented as appropriate)  Goal: Optimized Coping Skills in Response to Life Stressors  Outcome: Ongoing (interventions implemented as appropriate)  Goal: Develops/Participates in Therapeutic [place] to Support Successful Transition  Outcome: Ongoing (interventions implemented as appropriate)

## 2017-07-05 NOTE — PROGRESS NOTES
0945  Met with patient to discuss progress with treatment and disposition plans. She is feeling better today and Dr. Pearce anticipates discharge home tomorrow. She plans to return to her mothers house in [place] when discharged. She has follow up scheduled at [facility] for 6-12-17 @ 8:15 with [facility], patient is agreeable.     Patient denies suicidal thoughts, she reports feeling more positive today. She reports a decrease in anxiety and depression and reports improved sleep. Patient will most likely need assistance with a ride back to [place].

## 2017-07-06 VITALS
DIASTOLIC BLOOD PRESSURE: 72 MMHG | SYSTOLIC BLOOD PRESSURE: 114 MMHG | OXYGEN SATURATION: 97 % | TEMPERATURE: 98.1 F | HEART RATE: 73 BPM | RESPIRATION RATE: 18 BRPM

## 2017-07-06 PROCEDURE — 99239 HOSP IP/OBS DSCHRG MGMT >30: CPT | Performed by: PSYCHIATRY & NEUROLOGY

## 2017-07-06 RX ORDER — TRAZODONE HYDROCHLORIDE 100 MG/1
100 TABLET ORAL NIGHTLY PRN
Qty: 30 TABLET | Refills: 0 | Status: SHIPPED | OUTPATIENT
Start: 2017-07-06 | End: 2017-07-13 | Stop reason: SDUPTHER

## 2017-07-06 RX ORDER — SERTRALINE HYDROCHLORIDE 100 MG/1
100 TABLET, FILM COATED ORAL DAILY
Qty: 30 TABLET | Refills: 0 | Status: SHIPPED | OUTPATIENT
Start: 2017-07-06 | End: 2017-07-13 | Stop reason: SDUPTHER

## 2017-07-06 RX ADMIN — NICOTINE 1 PATCH: 21 PATCH TRANSDERMAL at 09:49

## 2017-07-06 RX ADMIN — SERTRALINE 50 MG: 50 TABLET, FILM COATED ORAL at 09:49

## 2017-07-06 RX ADMIN — CYCLOBENZAPRINE HYDROCHLORIDE 10 MG: 10 TABLET, FILM COATED ORAL at 07:27

## 2017-07-06 NOTE — PROGRESS NOTES
Covering therapist met with patient to address concerns and discuss disposition plan.  Patient stated she feels more positive today and ready to discharge.  Patient denied SI, HI, and AVH.  She states she plans to follow up with [facility] on July 12th and will return to her mother's home.  Patient appears stable to discharge home today.  Navigator to schedule transportation.

## 2017-07-06 NOTE — DISCHARGE SUMMARY
Date of Discharge:  7/6/2017    Discharge Diagnosis:Active Problems:    Severe major depression     Polysubstance Dependency        Presenting Problem/History of Present Illness  Severe major depression [F32.2]     Hospital Course  Patient is a 26 y.o. female presented depressed having made a nonlethal suicide attempt with overdose of heroin.  Patient was admitted placed on special precautions and a signed a master level therapist who assisted in a collaborative treatment effort.  Patient seen on a daily basis for evaluation as well as supportive therapy.  Patient's depression improved gradually during her hospital stay and at discharge patient was free of any thoughts of harming self or others and was minimally optimistic regarding her future.  Patient plan to follow-up with the IOP program at [facility] and has a follow-up appointment there on June 12.  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  See below for medications.      Procedures Performed none         Consults:   Consults     No orders found from 6/3/2017 to 7/3/2017.          Pertinent Test Results:   Lab Results (last 7 days)     ** No results found for the last 168 hours. **            Condition on Discharge:  improved    Vital Signs  Temp:  [98.1 °F (36.7 °C)-99 °F (37.2 °C)] 98.1 °F (36.7 °C)  Heart Rate:  [70-84] 77  Resp:  [18] 18  BP: (108-116)/(66-72) 108/68      Discharge Disposition  Home or Self Care    Discharge Medications   Zeenat Murrieta   Home Medication Instructions CLARE:[id number]    Printed on:07/06/17 0745   Medication Information                      sertraline (ZOLOFT) 100 MG tablet  Take 1 tablet by mouth Daily.             traZODone (DESYREL) 100 MG tablet  Take 1 tablet by mouth At Night As Needed for Sleep.                 Discharge  Diet:  regular    Activity at Discharge:  no restrictions    Follow-up Appointments: Patient to follow-up at the [facility] with appointment on June 12 at 8:15 in the morning      Test Results Pending at Discharge none       Spike Pearce MD  07/06/17  7:45 AM

## 2017-07-06 NOTE — PLAN OF CARE
Problem: BH Patient Care Overview (Adult)  Goal: Plan of Care Review  Outcome: Ongoing (interventions implemented as appropriate)    07/06/17 0042   Coping/Psychosocial Response Interventions   Plan Of Care Reviewed With patient   Coping/Psychosocial   Patient Agreement with Plan of Care agrees   Patient Care Overview   Progress improving   Outcome Evaluation   Outcome Summary/Follow up Plan Patient reports appetite is good but sleep is poor. Pt. rates anxiety 2/10 and depression 4/10. Patient denies SI/HI, cravings, or withdrawal symptoms.         Problem:  Overarching Goals  Goal: Adheres to Safety Considerations for Self and Others  Outcome: Ongoing (interventions implemented as appropriate)  Goal: Optimized Coping Skills in Response to Life Stressors  Outcome: Ongoing (interventions implemented as appropriate)  Goal: Develops/Participates in Therapeutic [place] to Support Successful Transition  Outcome: Ongoing (interventions implemented as appropriate)

## 2017-07-12 ENCOUNTER — APPOINTMENT (OUTPATIENT)
Dept: PSYCHIATRY | Facility: HOSPITAL | Age: 27
End: 2017-07-12

## 2017-07-12 ENCOUNTER — OFFICE VISIT (OUTPATIENT)
Dept: PSYCHIATRY | Facility: CLINIC | Age: 27
End: 2017-07-12

## 2017-07-12 DIAGNOSIS — F15.20 OTHER STIMULANT DEPENDENCE, UNCOMPLICATED (HCC): ICD-10-CM

## 2017-07-12 DIAGNOSIS — F11.20 UNCOMPLICATED OPIOID DEPENDENCE (HCC): Primary | ICD-10-CM

## 2017-07-12 DIAGNOSIS — F32.2 MAJOR DEPRESSIVE DISORDER, SINGLE EPISODE, SEVERE WITHOUT PSYCHOTIC FEATURES (HCC): ICD-10-CM

## 2017-07-12 PROCEDURE — 90834 PSYTX W PT 45 MINUTES: CPT | Performed by: COUNSELOR

## 2017-07-12 NOTE — PROGRESS NOTES
".    PROGRESS NOTE  TIME: 8:15 am - 9:00 am    Data:  Zeenat Murrieta came in 7/12/2017 for her  scheduled therapy session, [facility] Patient following up today after inpatient psychiatric at [facility]. Patient states she is interested in chemical dependency intensive outpatient program.  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  Upon discharge from inpatient psych, patient reports to using \"a little bit like $10 worth\" of heroin and meth 2 days ago.  Patient states she is not interested in short-term or long-term residential treatment.  Her  in [place] recommended that she begin treatment as soon as possible in [facility].  She had a court date today that she actually missed for this initial appointment and requested that we send documentation to [place] courts.  Patient briefly described a history of unstable moods, trouble sleeping, and poor appetite.  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      (Scales based on 0 - 10 with 10 being the worst)  Depression: 5 Anxiety: 5   Appetite poor Sleep: Fair with medication     Patient adamantly and convincingly denies current suicidal or homicidal ideation or perceptual disturbance.      Clinical Maneuvering/Intervention:  The patient was asked to describe her drug abuse in terms of the amount and pattern of use, and symptoms of abuse, negative life consequences that have resulted from chemical dependence.  The patient openly discussed her substance abuse history and was reinforced as she gave complete data regarding its nature and extent.  The patient was assessed to have a pattern of severe substance use.  It was recommended that she attempt residential long-term treatment but she declined. "  The patient was referred for chemical dependency intensive outpatient program for substance abuse treatment.      Assisted patient in identifying risk factors which would indicate the need for higher level of care including thoughts to harm self or others and/or self-harming behavior and encouraged patient to contact this office, call 911, or present to the nearest emergency room should any of these events occur. Discussed crisis intervention services and means to access.        Assessment     Mental Status Exam  Hygiene:  good  Dress:  casual  Attitude:  Cooperative  Motor Activity:  Appropriate  Speech:  Normal  Mood:  anxious  Affect:  calm and pleasant  Thought Processes:  Goal directed  Thought Content:  normal  Suicidal Thoughts:  denies  Homicidal Thoughts:  denies  Crisis Safety Plan: yes, to come to the emergency room.  Hallucinations:  denies    Plan     Patient is voluntarily requesting to be admitted to [facility] Phase I at [facility].  Patient is receptive to [facility] for assistance with maintaining sobriety.  Patient presents with history of drug misuse and substance dependence.  Patient is agreeable to 12 step support groups and plans to attend meetings and obtain a sponsor.  Patient expressed strong desire to maintain sobriety and participate in group therapy.  Patient verbalized desire to live a lifestyle free of drugs and/or alcohol.  Patient identifies long-term goal as maintaining sobriety. Patient appears to need assistance with maintaining sobriety due to a history of drug and alcohol abuse.  Patient has been unable to maintain sobriety after unsuccessful attempts to stop in the past.  Patient's strengths are motivation for treatment and desire to change.  Patient weaknesses include a history of substance misuse, lack of coping skills, and relapses when trying to quit without professional  help.  Patient appears motivated.  Patient will be admitted to the IOP program to begin on the  next scheduled group date.           Errors in dictation may reflect use of voice recognition software and not all errors in transcription may have been detected prior to signing.

## 2017-07-13 ENCOUNTER — OFFICE VISIT (OUTPATIENT)
Dept: PSYCHIATRY | Facility: HOSPITAL | Age: 27
End: 2017-07-13

## 2017-07-13 ENCOUNTER — LAB (OUTPATIENT)
Dept: LAB | Facility: HOSPITAL | Age: 27
End: 2017-07-13

## 2017-07-13 DIAGNOSIS — F15.20 OTHER STIMULANT DEPENDENCE, UNCOMPLICATED (HCC): ICD-10-CM

## 2017-07-13 DIAGNOSIS — F32.2 MAJOR DEPRESSIVE DISORDER, SINGLE EPISODE, SEVERE WITHOUT PSYCHOTIC FEATURES (HCC): ICD-10-CM

## 2017-07-13 DIAGNOSIS — F11.20 OPIOID DEPENDENCE, UNCOMPLICATED (HCC): Primary | ICD-10-CM

## 2017-07-13 DIAGNOSIS — F11.20 UNCOMPLICATED OPIOID DEPENDENCE (HCC): Primary | ICD-10-CM

## 2017-07-13 DIAGNOSIS — F11.20 UNCOMPLICATED OPIOID DEPENDENCE (HCC): ICD-10-CM

## 2017-07-13 LAB
AMPHET+METHAMPHET UR QL: NEGATIVE
AMPHETAMINES UR QL: POSITIVE
BARBITURATES UR QL SCN: NEGATIVE
BENZODIAZ UR QL SCN: NEGATIVE
BUPRENORPHINE SERPL-MCNC: NEGATIVE NG/ML
CANNABINOIDS SERPL QL: NEGATIVE
COCAINE UR QL: NEGATIVE
METHADONE UR QL SCN: NEGATIVE
OPIATES UR QL: NEGATIVE
OXYCODONE UR QL SCN: NEGATIVE
PCP UR QL SCN: NEGATIVE
PROPOXYPH UR QL: NEGATIVE
TRICYCLICS UR QL SCN: NEGATIVE

## 2017-07-13 PROCEDURE — H0015 ALCOHOL AND/OR DRUG SERVICES: HCPCS | Performed by: COUNSELOR

## 2017-07-13 PROCEDURE — 80306 DRUG TEST PRSMV INSTRMNT: CPT | Performed by: PSYCHIATRY & NEUROLOGY

## 2017-07-13 PROCEDURE — G0480 DRUG TEST DEF 1-7 CLASSES: HCPCS | Performed by: PSYCHIATRY & NEUROLOGY

## 2017-07-13 RX ORDER — TRAZODONE HYDROCHLORIDE 100 MG/1
100 TABLET ORAL NIGHTLY PRN
Qty: 30 TABLET | Refills: 2 | Status: SHIPPED | OUTPATIENT
Start: 2017-07-13 | End: 2017-12-05

## 2017-07-13 RX ORDER — SERTRALINE HYDROCHLORIDE 100 MG/1
100 TABLET, FILM COATED ORAL DAILY
Qty: 30 TABLET | Refills: 2 | Status: SHIPPED | OUTPATIENT
Start: 2017-07-13 | End: 2017-08-15 | Stop reason: SDUPTHER

## 2017-07-13 NOTE — PROGRESS NOTES
.Chemical Dependency Intensive Outpatient Program Individualized Treatment Plan    Time: 10:42am        Long Term Goal: Patient will maintain total abstinence from mind-altering substances and utilize behavioral and cognitive coping skills to maintain sobriety.    Patient Care Needs:   Relapse Risk: Patient presents with opiate dependency, uncomplicated, and is at high risk for relapse. Patient’s use has negatively impacted social and occupational functioning.    Objectives:  Patient will exhibit an increase in DASES score indicating increased confidence in ability to maintain sobriety.    Patient will identify situations, people, and places that are high-risk for relapse within two weeks of treatment.    Patient will attend group sessions as scheduled and participate by giving and receiving feedback.    Patient will develop a positive network of support by attending a minimum of three 12 step support groups each week and by obtaining a sponsor.    Patient will identify, practice, and implement at least 5 healthy coping strategies to manage difficult emotions while remaining abstinent.    Patient will show a decrease in score on the PH-Q depression scale indicating improvement in frequency of depressive symptoms.    Patient will demonstrate a decrease in score on the FRANK-7 questionnaire indicating improvement in frequency of anxiety symptoms.     Target Date: 7-27-17    Interventions: Therapist will educate and assist patient in increasing  knowledge of the patterns and effects of chemical dependence and the disease concept. Therapist will provide and review recovery literature to patient to aid in recovery. Patient will participate in random alcohol and drug screenings a minimum of twice per week. Therapist will educate and assist patient in developing a personal recovery plan. Therapist will lead 3 hour IOP groups 4 times per week, Monday through Thursday, during Phase 1 of treatment. Therapist will teach  cognitive behavioral techniques to dispute irrational beliefs associated with substance abuse habits. Family is encouraged to attend family education sessions every Wednesday. Therapist will provide patient with progress reports as needed.    Team Members:  Patient  MD  Licensed Therapist  Director    Treatment plan has been reviewed with patient and printed for signatures from all team members

## 2017-07-13 NOTE — PROGRESS NOTES
.  IOP GROUP NOTE    DATA: 3 hour IOP group therapy session (changes)  Hour 1: Therapist continued facilitation of rapport building strategies between group members. Therapist asked that each patient check in with home life and recovery efforts and identify triggers, cravings, and  high risk situations that arise between group sessions.   Hour 2: Therapist facilitated psychoeducation on how to focus on preventing relapse after moving to a lower level of care.  Hour 3: IOP Graduation      (Self-reporting scales based on 0 - 10 with 10 being the worst)  Depression: 3/10 Anxiety: 2/10   Triggers: 1/10 Cravings: 1/10       ASSESSMENT:      Hygiene:   good  Cooperation:  Cooperative  Eye Contact:  Good  Affect:  normal affect  Speech:  Normal  Thought Progress:  Goal directed  Reliability:  fair  Insight:  fair  Judgement:  fair  Impulse Control:  fair    Family issues related to recovery:  family history of substance abuse, lack of education surrounding the disease model, perception of lack of support    12-Step attendance: no  3 meeting minimum  per week    Sponsor: no    Identification of environmental and personal barriers to recovery:  coping with negative emotions, overall understanding of disease of addiction, loss of independence, impulse control    Motivation for treatment:  healthy lifestyle, long-term, recovery, and improving overall relationships.    RESPONSE: Patient presented to group on time and was cooperative throughout session. During check-in, patient reported Feeling somewhat anxious because today is her first day in group.  She is only 3 days clean and sober.  Patient reports no physical withdrawal symptoms.  Some restlessness but she also had a lot of caffeine this morning.  She introduced herself to the group and gave a brief summary of her drug use history.  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  She was also forthcoming about  how long she had been using.  Patient states she has been using opiates for 15 years and she is only 26    CLINICAL MANUVERING/INTERVENTIONS:  Patient was encouraged to bring family members for educational group on Wednesdays.  Patient was provided with encouragement and unconditional positive regard as patient opened up about struggles past and present.  Patient was encouraged to participate in 12-step group meetings and work the twelve steps with a sponsor.  Therapist encouraged patient to engage in problem solving abilities to identify and utilize healthy boundaries.    DASES SCORE:  59    PLAN:  Continue [facility] IOP Phase I.     Aftercare:  [facility], [facility] Phase II    Program Assignments:  Personal Journal, attendance of 12-step meetings, drug screens

## 2017-07-16 LAB — AMPHETAMINES UR QL: NEGATIVE

## 2017-07-17 ENCOUNTER — DOCUMENTATION (OUTPATIENT)
Dept: PSYCHIATRY | Facility: HOSPITAL | Age: 27
End: 2017-07-17

## 2017-07-17 ENCOUNTER — LAB (OUTPATIENT)
Dept: LAB | Facility: HOSPITAL | Age: 27
End: 2017-07-17

## 2017-07-17 DIAGNOSIS — F15.20 OTHER STIMULANT DEPENDENCE, UNCOMPLICATED (HCC): ICD-10-CM

## 2017-07-17 DIAGNOSIS — F32.2 MAJOR DEPRESSIVE DISORDER, SINGLE EPISODE, SEVERE WITHOUT PSYCHOTIC FEATURES (HCC): ICD-10-CM

## 2017-07-17 DIAGNOSIS — F11.20 UNCOMPLICATED OPIOID DEPENDENCE (HCC): ICD-10-CM

## 2017-07-17 LAB
AMPHET+METHAMPHET UR QL: NEGATIVE
AMPHETAMINES UR QL: NEGATIVE
BARBITURATES UR QL SCN: NEGATIVE
BENZODIAZ UR QL SCN: NEGATIVE
BUPRENORPHINE SERPL-MCNC: NEGATIVE NG/ML
CANNABINOIDS SERPL QL: NEGATIVE
COCAINE UR QL: NEGATIVE
METHADONE UR QL SCN: NEGATIVE
OPIATES UR QL: NEGATIVE
OXYCODONE UR QL SCN: NEGATIVE
PCP UR QL SCN: NEGATIVE
PROPOXYPH UR QL: NEGATIVE
TRICYCLICS UR QL SCN: NEGATIVE

## 2017-07-17 PROCEDURE — 80306 DRUG TEST PRSMV INSTRMNT: CPT | Performed by: PSYCHIATRY & NEUROLOGY

## 2017-07-17 NOTE — PROGRESS NOTES
Subjective   Zeenat Murrieta is a 26 y.o. female who is here today for medication management follow up.    Chief Complaint:     History of Present Illness    (Scales based on 0 - 10 with 10 being the worst)        The following portions of the patient's history were reviewed and updated as appropriate: allergies, current medications, past family history, past medical history, past social history, past surgical history and problem list.    Review of Systemsdenies fever, cough, s/s’s of infection, denies GI/ problems, denies new medical issues     Objective   Physical Exam  There were no vitals taken for this visit.    Allergies   Allergen Reactions   • Atarax [Hydroxyzine] GI Intolerance       Current Medications:   Current Outpatient Prescriptions   Medication Sig Dispense Refill   • sertraline (ZOLOFT) 100 MG tablet Take 1 tablet by mouth Daily. 30 tablet 2   • traZODone (DESYREL) 100 MG tablet Take 1 tablet by mouth At Night As Needed for Sleep. 30 tablet 2     No current facility-administered medications for this visit.        Mental Status Exam:   Appearance: appropriate  Hygiene:   good  Cooperation:  Cooperative  Eye Contact:  Good  Psychomotor Behavior:  Appropriate  Mood:  euthymic  Affect:  Appropriate  Hopelessness: Denies  Speech:  Normal  Thought Process:  Linear  Thought Content:  Normal  Suicidal:  None  Homicidal:  None  Hallucinations:  None  Delusion:  None  Memory:  Intact  Orientation:  Person, Place, Time and Situation  Reliability:  fair  Insight:  Fair  Judgement:  Fair  Impulse Control:  Fair  Estimated Intelligence: average range   Physical/Medical Issues:  No     Assessment/Plan   Diagnoses and all orders for this visit:    Opioid dependence, uncomplicated    Other orders  -     sertraline (ZOLOFT) 100 MG tablet; Take 1 tablet by mouth Daily.  -     traZODone (DESYREL) 100 MG tablet; Take 1 tablet by mouth At Night As Needed for Sleep.    cont sertraline and trazodone from [facility] discharge medications.     ·       We discussed risks, benefits, and side effects of the above medications and the patient was agreeable with the plan. Patient was educated on the importance of compliance with treatment and follow-up appointments.   Controlled substance prescriptions are either  printed off for patient, telephoned in  or ordered through [facility] by this provider  Instructed to call for questions or concerns and return early if necessary. Crisis plan reviewed including going to the Emergency department.    No Follow-up on file.

## 2017-07-17 NOTE — PROGRESS NOTES
NAME: Zeenat Murrieta  Date: 07/17/17  AM GROUP/  9-12pm  [facility] GROUP NOTE    DATA: (Check ins, Inside Out, Sobriety, Coping Skills )     Hour 1:Therapist facilitated discussion of the daily motivation passage from the “Daily Reflections” ([employer] (1991). Therapist continued facilitation of rapport building strategies between group members. Therapist asked that each patient check in with home life and recovery efforts and identify triggers, cravings, and high risk situations that arise between group sessions. Members discussed barriers and benefits of attending 12 step meetings. Therapist provided empathy and support during group meeting. Began watching INSIDE OUT to help identify negative thinking issues  Hour 2: Continued watching INSIDE OUT and began to identify character traits that the personality showed. Therapist continued facilitation of rapport building strategies between group members. Therapist provided empathy and support during group as members shared some of the thinking issues they could identify.      Hour 3:  Completed watching Inside Out and how this can be applied to different parts of the patients stories about substance mis- use. The patient was engaged and given encouragement to share her identification of thinking issues         RESPONSE:. Patient presented to group on time and was cooperative throughout session. During check-in, patient reported feeling anxious because of old people coming to visit her this weekend.  She is only 7 days clean and sober. Patient reports no physical withdrawal symptoms. Some restlessness but she also had a lot of caffeine this morning. 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 She was also forthcoming about how long she had been using. Patient states she has been using opiates for 15 years and she is only 26.  She shared some insight into her thoughts that hinder  sobriety.      ASSESSMENT:  Hygiene:   good  Cooperation:  Cooperative  Eye Contact:  Good  Affect:  normal affect  Speech:  Normal  Thought Progress:  Goal directed and Linear  Reliability:  fair  Insight:  Fair  Judgement:  Fair  Impulse Control:  Fair    Family issues related to recovery:  family history of substance abuse, lack of education surrounding the disease model, perception of lack of support    12-Step attendance: no  not meeting minimum requirement to attend three 12 step meetings per week    Sponsor: no  ALLIE 59    Identification of environmental and personal barriers to recovery: coping with limited emotions, remorse, guilt, work, family, overall understanding of disease of addiction   Motivation for treatment: Healthy lifestyle, recovery, healthy relationships        Motivation for treatment:  healthy lifestyle, long-term, recovery, and improving overall relationships    Impression/Formulation:    VISIT DIAGNOSIS: Uncomplicated Opioid Dependence,  Major Depressive Disorder.     CLINICAL MANUVERING/IINTERVENTIONS: CBT and group interaction activites utilized to stress relapse recovery/prevention. Patient to actively participate in activity, engage verbally with peers and staff, display an appropriate affect, keep conversation appropriate to topic, and display no negative or impulsive behaviors. Member was encouraged to bring family members for educational group on Wednesdays and Thursday. Member was provided with encouragement and unconditional positive regard. Member was encouraged to continue participation with 12-step meetings and maintaining positive daily choices.       INTERVENTIONS:  CBT and group interaction activites utilized to stress relapse recovery/prevention. Patient to actively participate in activity, engage verbally with peers and staff, display an appropriate affect, keep conversation appropriate to topic, and display no negative or impulsive behaviors. Member was encouraged to  bring family members for educational group on Wednesdays and Thursday. Member was provided with encouragement and unconditional positive regard. Member was encouraged to continue participation with 12-step meetings and maintaining positive daily choices.       PLAN:  Continue [facility] IOP Phase I.     Aftercare:  [facility] IOP Phase II    Program Assignments:  Personal Journal, attendance of 12-step meetings, drug screens

## 2017-07-19 ENCOUNTER — OFFICE VISIT (OUTPATIENT)
Dept: PSYCHIATRY | Facility: HOSPITAL | Age: 27
End: 2017-07-19

## 2017-07-19 DIAGNOSIS — F15.20 OTHER STIMULANT DEPENDENCE, UNCOMPLICATED (HCC): ICD-10-CM

## 2017-07-19 DIAGNOSIS — F32.2 MAJOR DEPRESSIVE DISORDER, SINGLE EPISODE, SEVERE WITHOUT PSYCHOTIC FEATURES (HCC): ICD-10-CM

## 2017-07-19 DIAGNOSIS — F11.20 UNCOMPLICATED OPIOID DEPENDENCE (HCC): Primary | ICD-10-CM

## 2017-07-19 PROCEDURE — H0015 ALCOHOL AND/OR DRUG SERVICES: HCPCS | Performed by: SOCIAL WORKER

## 2017-07-19 NOTE — PROGRESS NOTES
"[facility] GROUP NOTE     DATA: (Check ins, Communication, Sobriety, Coping Skills )      Hour 1: Therapist introduced self, asked group members to introduce selves, what brings them to [facility], and totals days of sobriety. Facilitated rapport building strategies between group members. Asked that each patient check in with home life and recovery efforts and identify triggers, cravings, and high risk situations that arise between group sessions.   Hour 2: Therapist facilitated asked group members to create a list of \"stay-busy activities\" they can engage in when confronted with triggers or when feeling vulnerable or anxious. Triggers and cravings can come on at any time and in any situation. Provided notecards and writing utensils and asked group members to take notes on distractions and coping methods and to share with the group. Encouraged participation by allowing members to freely express frustrations in recovery, how they've overcome frustration in the past, and how they can overcome them in the present and future. Discussed and practice the use of \"I statements\" as way to assert the speaker's feelings without attributing them to the listener.  Hour 3: Therapist facilitated further discussion and brainstorming of \"stay-busy\" relapse prevention activities that group members can utilize by themselves with little to no preparation, as triggers and cravings can be sudden, and group members will not always be prepared or able to rely on certain strategies depending on the situation (i.e., location, time of day, availability of sober support, etc).      RESPONSE:  Group member presented to group on time and was cooperative throughout session. Today makes 8 days clean and sober. During check-in, group member introduced herself and discussed the circumstances leading up to her being admitted to [facility]. 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 She discussed her spirituality and opened up about intentionally overdosing on heroin in her vehicle at night when a nurse walking by discovered her, just so happened to have Narcan with her, and was able to administer the Narcan and CPR until medics arrived which ultimately saved group member's life. She reported feeling physically tired after having barely slept last night due to night terrors. She said she woke up dripping sweat and had to go walk around for 3 hours. She shared \"stay-busy\" activities of going for a walk, listening to music, coloring, and calling a sober friend when confronted with cravings or triggers. She provided feedback to another group member in regards to emotionally pushing their children away for fear that they (the group members) will mess up again. The member discussed her experience at her first NA meeting the previous night and discussed previous experiences in support group meetings. She was receptive to group feedback.        ASSESSMENT:    Hygiene:   good  Cooperation:  Cooperative  Eye Contact:  Good  Affect:  normal affect  Speech:  Normal  Thought Progress:  Goal directed and Linear  Reliability:  fair  Insight:  Fair  Judgement:  Fair  Impulse Control:  Fair     Family issues related to recovery:  family history of substance abuse, lack of education surrounding the disease model, perception of lack of support     12-Step attendance:  Yes, went to first meeting last night  not meeting minimum requirement to attend three 12 step meetings per week     Sponsor:  No, obtained list of 15 potential sponors    DASES: 59     Identification of environmental and personal barriers to recovery: coping with limited emotions, remorse, guilt, work, family, overall understanding of disease of addiction        Motivation for treatment:  healthy lifestyle, long-term recovery, and improving overall " relationships     CLINICAL MANUVERING/IINTERVENTIONS:  Patient was encouraged to bring family members for educational group on Wednesdays. Patient was provided with encouragement and unconditional positive regard as patient opened up about struggles past and present. Patient was encouraged to participate in 12-step group meetings and work the twelve steps with a sponsor. Therapist encouraged patient to engage in problem solving abilities to identify and utilize healthy boundaries.    with 12-step meetings and maintaining positive daily choices.         PLAN:  Continue [facility] IOP Phase I      Aftercare:  [facility] IOP Phase II     Program Assignments:  Personal Journal, attendance of 12-step meetings, obtain a sponsor, drug screens

## 2017-07-20 ENCOUNTER — OFFICE VISIT (OUTPATIENT)
Dept: PSYCHIATRY | Facility: HOSPITAL | Age: 27
End: 2017-07-20

## 2017-07-20 ENCOUNTER — LAB (OUTPATIENT)
Dept: LAB | Facility: HOSPITAL | Age: 27
End: 2017-07-20

## 2017-07-20 DIAGNOSIS — F11.20 UNCOMPLICATED OPIOID DEPENDENCE (HCC): Primary | ICD-10-CM

## 2017-07-20 DIAGNOSIS — F11.20 UNCOMPLICATED OPIOID DEPENDENCE (HCC): ICD-10-CM

## 2017-07-20 DIAGNOSIS — F32.2 MAJOR DEPRESSIVE DISORDER, SINGLE EPISODE, SEVERE WITHOUT PSYCHOTIC FEATURES (HCC): ICD-10-CM

## 2017-07-20 DIAGNOSIS — F15.20 OTHER STIMULANT DEPENDENCE, UNCOMPLICATED (HCC): ICD-10-CM

## 2017-07-20 DIAGNOSIS — F11.20 OPIOID DEPENDENCE, UNCOMPLICATED (HCC): ICD-10-CM

## 2017-07-20 DIAGNOSIS — F33.2 MDD (MAJOR DEPRESSIVE DISORDER), RECURRENT SEVERE, WITHOUT PSYCHOSIS (HCC): ICD-10-CM

## 2017-07-20 LAB
AMPHET+METHAMPHET UR QL: POSITIVE
AMPHETAMINES UR QL: POSITIVE
BARBITURATES UR QL SCN: NEGATIVE
BENZODIAZ UR QL SCN: NEGATIVE
BUPRENORPHINE SERPL-MCNC: POSITIVE NG/ML
CANNABINOIDS SERPL QL: NEGATIVE
COCAINE UR QL: NEGATIVE
METHADONE UR QL SCN: NEGATIVE
OPIATES UR QL: NEGATIVE
OXYCODONE UR QL SCN: NEGATIVE
PCP UR QL SCN: NEGATIVE
PROPOXYPH UR QL: NEGATIVE
TRICYCLICS UR QL SCN: NEGATIVE

## 2017-07-20 PROCEDURE — H0015 ALCOHOL AND/OR DRUG SERVICES: HCPCS | Performed by: PSYCHIATRY & NEUROLOGY

## 2017-07-20 PROCEDURE — 80306 DRUG TEST PRSMV INSTRMNT: CPT | Performed by: PSYCHIATRY & NEUROLOGY

## 2017-07-24 ENCOUNTER — OFFICE VISIT (OUTPATIENT)
Dept: PSYCHIATRY | Facility: HOSPITAL | Age: 27
End: 2017-07-24

## 2017-07-24 DIAGNOSIS — F15.20 OTHER STIMULANT DEPENDENCE, UNCOMPLICATED (HCC): ICD-10-CM

## 2017-07-24 DIAGNOSIS — F11.20 UNCOMPLICATED OPIOID DEPENDENCE (HCC): Primary | ICD-10-CM

## 2017-07-24 DIAGNOSIS — F32.2 MAJOR DEPRESSIVE DISORDER, SINGLE EPISODE, SEVERE WITHOUT PSYCHOTIC FEATURES (HCC): ICD-10-CM

## 2017-07-24 PROCEDURE — H0015 ALCOHOL AND/OR DRUG SERVICES: HCPCS | Performed by: PSYCHIATRY & NEUROLOGY

## 2017-07-24 NOTE — PROGRESS NOTES
".  IOP GROUP NOTE      DATA:    3 hour IOP group therapy session (changes)  Hour 1: Therapist continued facilitation of rapport building strategies between group members. Therapist asked that each patient check in with home life and recovery efforts and identify triggers, cravings, and  high risk situations that arise between group sessions.   Hour 2: Therapist facilitated psychoeducation on coping skills. The tool COPE CARDS were used as a guide to lead group discussion regarding certain coping skills for situations that arise in recovery.   Hour 3: 12-step program     (Self-reporting scales based on 0 - 10 with 10 being the worst)  Depression: 3/10 Anxiety: 1/10   Triggers: 2/10 Cravings: 3/10       ASSESSMENT:      Hygiene:   good  Cooperation:  Cooperative  Eye Contact:  Good  Affect:  normal affect  Speech:  Normal  Thought Progress:  Goal directed  Reliability:  fair  Insight:  fair  Judgement:  fair  Impulse Control:  fair    Family issues related to recovery:  family history of substance abuse, lack of education surrounding the disease model, perception of lack of support    12-Step attendance: yes - patient has been to a meeting at [facility] everyday since last Wednesday.   3 meeting minimum  per week    Sponsor: Looking - has a potential sponsor that she plans to call today to discuss everything with.    Identification of environmental and personal barriers to recovery:  coping with negative emotions, overall understanding of disease of addiction, loss of independence, impulse control    Motivation for treatment:  healthy lifestyle, long-term, recovery, and improving overall relationships.    RESPONSE: Patient presented to group on time and was cooperative throughout session. During check-in, patient reported physically having pain in her left leg that \"feels like labor pains\". She has been having physical pain since early last week. Patient reported having high triggers last Wednesday to use. She reports " "feeling guilty for what she put her ex-boyfriend though and \"rage flowed all though me until I almost snapped\". She reported driving to her drug dealers home on Wednesday evening. She then sat outside in the driveway for 5 minutes contemplating the consequences if she choose to go in and use. She reports driving away, very tearful, and going to [facility] for an NA meeting instead. She met up with other [facility] group members once she got to the meeting and reportedly shared about her recent experience and received positive feedback. She reports going to a nightly meeting there every night since this incident \"because I need it to keep from idle hands\".     CLINICAL MANUVERING/INTERVENTIONS:  Patient was encouraged to bring family members for educational group on Wednesdays.  Patient was provided with encouragement and unconditional positive regard as patient opened up about struggles past and present.  Patient was encouraged to participate in 12-step group meetings and work the twelve steps with a sponsor.  Therapist encouraged patient to engage in problem solving abilities to identify and utilize healthy boundaries.    DASES SCORE:  59    PLAN:  Continue [facility] IOP Phase I.     Aftercare:  [facility], IOP Phase II    Program Assignments:  Personal Journal, attendance of 12-step meetings, drug screens  "

## 2017-07-25 ENCOUNTER — OFFICE VISIT (OUTPATIENT)
Dept: PSYCHIATRY | Facility: HOSPITAL | Age: 27
End: 2017-07-25

## 2017-07-25 VITALS
SYSTOLIC BLOOD PRESSURE: 106 MMHG | WEIGHT: 145 LBS | DIASTOLIC BLOOD PRESSURE: 80 MMHG | HEIGHT: 66 IN | BODY MASS INDEX: 23.3 KG/M2 | HEART RATE: 76 BPM

## 2017-07-25 DIAGNOSIS — F11.20 UNCOMPLICATED OPIOID DEPENDENCE (HCC): ICD-10-CM

## 2017-07-25 DIAGNOSIS — F32.2 MAJOR DEPRESSIVE DISORDER, SINGLE EPISODE, SEVERE WITHOUT PSYCHOTIC FEATURES (HCC): ICD-10-CM

## 2017-07-25 DIAGNOSIS — F15.20 METHAMPHETAMINE DEPENDENCE (HCC): Primary | ICD-10-CM

## 2017-07-25 PROCEDURE — 99213 OFFICE O/P EST LOW 20 MIN: CPT | Performed by: PSYCHIATRY & NEUROLOGY

## 2017-07-25 PROCEDURE — H0015 ALCOHOL AND/OR DRUG SERVICES: HCPCS | Performed by: PSYCHIATRY & NEUROLOGY

## 2017-07-25 NOTE — PROGRESS NOTES
".  IOP GROUP NOTE    DATA:    3 hour IOP group therapy session (changes)  Hour 1: Therapist continued facilitation of rapport building strategies between group members. Therapist asked that each patient check in with home life and recovery efforts and identify triggers, cravings, and  high risk situations that arise between group sessions.   Hour 2: Therapist facilitated psychoeducation on art therapy benefits in addiction treatment. In art therapy, a person uses their creativity and imagination to make art that expresses in a healthy and productive way. The goal of art therapy is for the person to expand their forms of communication in order to better convey their experiences.   Hour 3: art therapy exercise faciliated    (Self-reporting scales based on 0 - 10 with 10 being the worst)  Depression: 2/10 Anxiety: 2/10   Triggers: 0/10 Cravings: 2/10       ASSESSMENT:      Hygiene:   good  Cooperation:  Cooperative  Eye Contact:  Good  Affect:  normal affect  Speech:  Normal  Thought Progress:  Goal directed  Reliability:  fair  Insight:  fair  Judgement:  fair  Impulse Control:  fair    Family issues related to recovery:  family history of substance abuse, lack of education surrounding the disease model, perception of lack of support    12-Step attendance: yes  3 meeting minimum  per week    Sponsor: no    Identification of environmental and personal barriers to recovery:  coping with negative emotions, overall understanding of disease of addiction, loss of independence, impulse control    Motivation for treatment:  healthy lifestyle, long-term, recovery, and improving overall relationships.    RESPONSE: Patient presented to group on time and was cooperative throughout session. During check-in, patient reported Patient began session tearfully.  She reports feeling \"petrified\" that she will relapse.  She is emotionally scared of letting herself down.  Patient states cravings are rally bothering her. She feels tired, " groggy, and in a fog.  She has been attending meetings every night and states she plans to continue to do this until the fogginess dissipates.  Patient states she is grateful to be sober 15 days.  She adamantly denies the positive drug screen.  She was tested here in the clinic and we will receive a comprehensive breakdown within the next few days.  Patient also shared about having a big ego and a tendency to become cocky.  She does not want to give herself any credit for progress in recovery because she doesn't want to get cocky and relapse.  Patient said for example when people were praising her for going to a meeting every night, she quickly told him to stop because she does not want to feel like she is getting better.  Patient believes putting herself down his humbling    CLINICAL MANUVERING/INTERVENTIONS:  Patient was encouraged to bring family members for educational group on Wednesdays.  Patient was provided with encouragement and unconditional positive regard as patient opened up about struggles past and present.  Patient was encouraged to participate in 12-step group meetings and work the twelve steps with a sponsor.  Therapist encouraged patient to engage in problem solving abilities to identify and utilize healthy boundaries.    DASES SCORE:  59    PLAN:  Continue [facility] IOP Phase I.     Aftercare:  [facility], IOP Phase II    Program Assignments:  Personal Journal, attendance of 12-step meetings, drug screens

## 2017-07-25 NOTE — PROGRESS NOTES
"Progress Note    CC: methamphetamine dependence     HX:  The patient reviewed some of her history leading up to entering the program.  The patient reports she was depressed and tried to overdose prior to being admitted to the [facility] in [place].  She came here to continue treatment for opiate use disorder and methamphetamine use disorder.  She reports no drug use since  and she had overdosed.  I reviewed her UDSs from 2017 which was normal and then her follow-up UDS on  showed amphetamines and buprenorphine.  She adamantly denied using either of these substances.  The patient reports craving has been low.  Patient's main family today is feeling tired.  She is been sleeping well with trazodone and has noticed feeling more groggy with this medication.  However, she believes someone else also may be at play.  The patient has a family history of thyroid cancer and her sister and has been exposed to hepatitis C.  Previous tests for HIV a negative however requested that we check this again.    Depression rating 2/10, no suicidal   Anxiety rating 1/10  Cravin/10    Appetite-good   Energy level-good  Sleep-good with trazodone    I have reviewed pt's allergies and current medications.     Review of Systems   Constitutional: Positive for fatigue.   Cardiovascular: Negative.    Gastrointestinal: Negative.    Neurological: Negative.      /80  Pulse 76  Ht 66\" (167.6 cm)  Wt 145 lb (65.8 kg)  BMI 23.4 kg/m2    EXAM: Neatly, casually dressed, good hygeine. Gait and station stable. No psychomotor agitation/retardation. No motor tics Speech is normal rate, amount. Associations intact. Mood \"tired\" affect slightly anxious.. TC-goal directed.  Denies SI/HI/VH/AH. TP-linear.  Attention and concentration are fair. Memory is intact for recent and remote events. Insight-fair, judgement-fair      Encounter Diagnoses   Name Primary?   • Methamphetamine dependence Yes   • Major depressive disorder, single " episode, severe without psychotic features    • Uncomplicated opioid dependence        Current Outpatient Prescriptions   Medication Sig Dispense Refill   • sertraline (ZOLOFT) 100 MG tablet Take 1 tablet by mouth Daily. 30 tablet 2   • traZODone (DESYREL) 100 MG tablet Take 1 tablet by mouth At Night As Needed for Sleep. 30 tablet 2     No current facility-administered medications for this visit.    Plan:  1. Decrease trazodone to 50 mg nightly  2.  Obtain TSH and HIV  3.  Continue IOP          The risks, benefits, and treatment alternatives were discussed with the patient.     TIME IN:536Q  TIME OUT:837N

## 2017-07-26 ENCOUNTER — OFFICE VISIT (OUTPATIENT)
Dept: PSYCHIATRY | Facility: HOSPITAL | Age: 27
End: 2017-07-26

## 2017-07-26 DIAGNOSIS — F15.20 METHAMPHETAMINE DEPENDENCE (HCC): Primary | ICD-10-CM

## 2017-07-26 DIAGNOSIS — F11.20 UNCOMPLICATED OPIOID DEPENDENCE (HCC): ICD-10-CM

## 2017-07-26 PROCEDURE — H0015 ALCOHOL AND/OR DRUG SERVICES: HCPCS | Performed by: PSYCHIATRY & NEUROLOGY

## 2017-07-26 NOTE — PROGRESS NOTES
".  IOP GROUP NOTE    DATA:    3 hour IOP group therapy session (changes)  Hour 1: Therapist continued facilitation of rapport building strategies between group members. Therapist asked that each patient check in with home life and recovery efforts and identify triggers, cravings, and  high risk situations that arise between group sessions.   Hour 2: Community volunteer shared the story of her road to recovery - highlighting highs and lows  Hour 3: Questions and discussion with the community volunteer continued as group members discussed how her story impacted them and how they could relate to what happened in her life    (Self-reporting scales based on 0 - 10 with 10 being the worst)  Depression: 3/10 Anxiety: 4/10   Triggers: 5/10 Cravings: 6/10       ASSESSMENT:      Hygiene:   good  Cooperation:  Cooperative  Eye Contact:  Good  Affect:  normal affect  Speech:  Normal  Thought Progress:  Goal directed  Reliability:  fair  Insight:  fair  Judgement:  fair  Impulse Control:  fair    Family issues related to recovery:  family history of substance abuse, lack of education surrounding the disease model, perception of lack of support    12-Step attendance: yes  3 meeting minimum  per week    Sponsor: \"trial run with a few women\"    Identification of environmental and personal barriers to recovery:  coping with negative emotions, overall understanding of disease of addiction, loss of independence, impulse control    Motivation for treatment:  healthy lifestyle, long-term, recovery, and improving overall relationships.    RESPONSE: Patient presented to group on time and was cooperative throughout session. During check-in, patient reported Doubting herself constantly.  Patient states her friends are still using constantly make fun of her \"for being a quitter\".  The group encouraged patient to remove herself from situations with old people, places, and things.  She was observed defensive after that advice stating she " doesn't know that many people here and her friends are good people.  Today makes 16 days clean and sober.  She has been attending AA meetings every night at the [facility].  Patient reports having a few different women that she is considering for sponsor.    CLINICAL MANUVERING/INTERVENTIONS:  Patient was encouraged to bring family members for educational group on Wednesdays.  Patient was provided with encouragement and unconditional positive regard as patient opened up about struggles past and present.  Patient was encouraged to participate in 12-step group meetings and work the twelve steps with a sponsor.  Therapist encouraged patient to engage in problem solving abilities to identify and utilize healthy boundaries.    DASES SCORE:  59    PLAN:  Continue [facility] IOP Phase I.     Aftercare:  [facility], [facility] Phase II    Program Assignments:  Personal Journal, attendance of 12-step meetings, drug screens

## 2017-07-27 ENCOUNTER — LAB (OUTPATIENT)
Dept: LAB | Facility: HOSPITAL | Age: 27
End: 2017-07-27

## 2017-07-27 ENCOUNTER — OFFICE VISIT (OUTPATIENT)
Dept: PSYCHIATRY | Facility: HOSPITAL | Age: 27
End: 2017-07-27

## 2017-07-27 DIAGNOSIS — F11.20 UNCOMPLICATED OPIOID DEPENDENCE (HCC): ICD-10-CM

## 2017-07-27 DIAGNOSIS — F32.2 MAJOR DEPRESSIVE DISORDER, SINGLE EPISODE, SEVERE WITHOUT PSYCHOTIC FEATURES (HCC): ICD-10-CM

## 2017-07-27 DIAGNOSIS — F15.20 METHAMPHETAMINE DEPENDENCE (HCC): ICD-10-CM

## 2017-07-27 DIAGNOSIS — F15.20 OTHER STIMULANT DEPENDENCE, UNCOMPLICATED (HCC): ICD-10-CM

## 2017-07-27 DIAGNOSIS — F15.20 METHAMPHETAMINE DEPENDENCE (HCC): Primary | ICD-10-CM

## 2017-07-27 LAB
AMPHET+METHAMPHET UR QL: NEGATIVE
AMPHETAMINES UR QL: NEGATIVE
BARBITURATES UR QL SCN: NEGATIVE
BENZODIAZ UR QL SCN: NEGATIVE
BUPRENORPHINE SERPL-MCNC: NEGATIVE NG/ML
CANNABINOIDS SERPL QL: NEGATIVE
COCAINE UR QL: NEGATIVE
METHADONE UR QL SCN: NEGATIVE
OPIATES UR QL: NEGATIVE
OXYCODONE UR QL SCN: NEGATIVE
PCP UR QL SCN: NEGATIVE
PROPOXYPH UR QL: NEGATIVE
TRICYCLICS UR QL SCN: NEGATIVE
TSH SERPL DL<=0.05 MIU/L-ACNC: 1.16 MIU/ML (ref 0.47–4.68)

## 2017-07-27 PROCEDURE — 36415 COLL VENOUS BLD VENIPUNCTURE: CPT

## 2017-07-27 PROCEDURE — 80306 DRUG TEST PRSMV INSTRMNT: CPT | Performed by: PSYCHIATRY & NEUROLOGY

## 2017-07-27 PROCEDURE — H0015 ALCOHOL AND/OR DRUG SERVICES: HCPCS | Performed by: PSYCHIATRY & NEUROLOGY

## 2017-07-27 PROCEDURE — 84443 ASSAY THYROID STIM HORMONE: CPT | Performed by: PSYCHIATRY & NEUROLOGY

## 2017-07-27 PROCEDURE — 87389 HIV-1 AG W/HIV-1&-2 AB AG IA: CPT | Performed by: PSYCHIATRY & NEUROLOGY

## 2017-07-27 NOTE — PROGRESS NOTES
".  IOP GROUP NOTE    DATA:    3 hour IOP group therapy session (changes)  Hour 1: Therapist continued facilitation of rapport building strategies between group members. Therapist asked that each patient check in with home life and recovery efforts and identify triggers, cravings, and  high risk situations that arise between group sessions.   Hour 2: Therapist facilitated psychoeducation on discussing the difficulties and being honest about cravings and triggers.Normalized letting feelings out and sharing what is going on at home with r recovering friends, sponsor, 12-step meeting, or IOP.  Therapist explained that if you are going through something, the chances are high that someone else is going through it, has gone through it, or will go through it.  Talked to someone who can help you decide if drinking or getting high is really the best choice for you right now.  Sharing what is going on in your heart will not only help you, it will also help someone else.  Recovery programs are \"we programs\" - you do not just stay sober by yourself.  Hour 3: Group discussion faciliated on running into challenges, or feeling like drinking or using and how not keeping it to yourself, and trying to manage on your own, will help strengthen recovery.     (Self-reporting scales based on 0 - 10 with 10 being the worst)  Depression: 7/10 Anxiety: 7/10   Triggers: 5/10 Cravings: 5/10       ASSESSMENT:      Hygiene:   good  Cooperation:  Cooperative  Eye Contact:  Good  Affect:  normal affect  Speech:  Normal  Thought Progress:  Goal directed  Reliability:  fair  Insight:  fair  Judgement:  fair  Impulse Control:  fair    Family issues related to recovery:  family history of substance abuse, lack of education surrounding the disease model, perception of lack of support    12-Step attendance: yes  3 meeting minimum  per week    Sponsor: no    Identification of environmental and personal barriers to recovery:  coping with negative emotions, " "overall understanding of disease of addiction, loss of independence, impulse control    Motivation for treatment:  healthy lifestyle, long-term, recovery, and improving overall relationships.    RESPONSE: Patient presented to group on time and was cooperative throughout session. During check-in, patient reported physically feeling good today, she woke up without having to hit her snooze button on her alarm multiple times today.  Emotionally she feels nervous about relapse.  She opened up during group discussion about unemployment and self doubt.  She displayed a pessimistic view of her recovery efforts as saying \"I'm hanging onto my recovery with a very thin rope\" and \"if heroin were here I would snort that stuff so quick\".    CLINICAL MANUVERING/INTERVENTIONS:  Patient was encouraged to bring family members for educational group on Wednesdays.  Patient was provided with encouragement and unconditional positive regard as patient opened up about struggles past and present.  Patient was encouraged to participate in 12-step group meetings and work the twelve steps with a sponsor.  Therapist encouraged patient to engage in problem solving abilities to identify and utilize healthy boundaries.    DASES SCORE:  59    PLAN:  Continue [facility] IOP Phase I.     Aftercare:  [facility], [facility] Phase II    Program Assignments:  Personal Journal, attendance of 12-step meetings, drug screens  "

## 2017-07-28 LAB — HIV 1+2 AB+HIV1 P24 AG SERPL QL IA: NON REACTIVE

## 2017-07-31 ENCOUNTER — OFFICE VISIT (OUTPATIENT)
Dept: PSYCHIATRY | Facility: HOSPITAL | Age: 27
End: 2017-07-31

## 2017-07-31 ENCOUNTER — LAB (OUTPATIENT)
Dept: LAB | Facility: HOSPITAL | Age: 27
End: 2017-07-31

## 2017-07-31 DIAGNOSIS — F15.20 METHAMPHETAMINE DEPENDENCE (HCC): Primary | ICD-10-CM

## 2017-07-31 DIAGNOSIS — F15.20 OTHER STIMULANT DEPENDENCE, UNCOMPLICATED (HCC): ICD-10-CM

## 2017-07-31 DIAGNOSIS — F32.2 MAJOR DEPRESSIVE DISORDER, SINGLE EPISODE, SEVERE WITHOUT PSYCHOTIC FEATURES (HCC): ICD-10-CM

## 2017-07-31 DIAGNOSIS — F11.20 UNCOMPLICATED OPIOID DEPENDENCE (HCC): ICD-10-CM

## 2017-07-31 PROCEDURE — 80306 DRUG TEST PRSMV INSTRMNT: CPT | Performed by: PSYCHIATRY & NEUROLOGY

## 2017-07-31 PROCEDURE — H0015 ALCOHOL AND/OR DRUG SERVICES: HCPCS | Performed by: PSYCHIATRY & NEUROLOGY

## 2017-07-31 NOTE — PROGRESS NOTES
".  IOP GROUP NOTE    DATA:    3 hour IOP group therapy session (changes)  Hour 1: Therapist continued facilitation of rapport building strategies between group members. Therapist asked that each patient check in with home life and recovery efforts and identify triggers, cravings, and  high risk situations that arise between group sessions.   Hour 2: Therapist facilitated psychoeducation psychoeducation on the voice of addiction, \"[place]\".   Hour 3: 12-step program      (Self-reporting scales based on 0 - 10 with 10 being the worst)  Depression: 0/10 Anxiety: 1/10   Triggers: 0/10 Cravings: 0/10       ASSESSMENT:      Hygiene:   good  Cooperation:  Cooperative  Eye Contact:  Good  Affect:  normal affect  Speech:  Normal  Thought Progress:  Goal directed  Reliability:  fair  Insight:  fair  Judgement:  fair  Impulse Control:  fair    Family issues related to recovery:  family history of substance abuse, lack of education surrounding the disease model, perception of lack of support    12-Step attendance: yes  3 meeting minimum  per week    Sponsor: yes    Identification of environmental and personal barriers to recovery:  coping with negative emotions, overall understanding of disease of addiction, loss of independence, impulse control    Motivation for treatment:  healthy lifestyle, long-term, recovery, and improving overall relationships.    RESPONSE: Patient presented to group on time and was cooperative throughout session. During check-in, patient reported Physically and emotionally feeling great today and spiritually peaceful.  Patient shared about high cravings to use 8 out of 10, dreams about using, fantasizing about using usually when bored, and social isolation.  Patient has been trying to help others and not put herself first.  She has family visiting this week which is made it challenging.  Patient said the weekend was \"very hard\" and \"I almost relapsed at least twice\".  When confronted by other group members " "as to why the weekend was rough, patient would only say \"men\".    CLINICAL MANUVERING/INTERVENTIONS:  Patient was encouraged to bring family members for educational group on Wednesdays.  Patient was provided with encouragement and unconditional positive regard as patient opened up about struggles past and present.  Patient was encouraged to participate in 12-step group meetings and work the twelve steps with a sponsor.  Therapist encouraged patient to engage in problem solving abilities to identify and utilize healthy boundaries.    DASES SCORE:  59    PLAN:  Continue [facility] IOP Phase I.     Aftercare:  [facility], IOP Phase II    Program Assignments:  Personal Journal, attendance of 12-step meetings, drug screens  "

## 2017-08-01 ENCOUNTER — OFFICE VISIT (OUTPATIENT)
Dept: PSYCHIATRY | Facility: HOSPITAL | Age: 27
End: 2017-08-01

## 2017-08-01 DIAGNOSIS — F43.10 POST TRAUMATIC STRESS DISORDER (PTSD): ICD-10-CM

## 2017-08-01 DIAGNOSIS — F32.2 MAJOR DEPRESSIVE DISORDER, SINGLE EPISODE, SEVERE WITHOUT PSYCHOTIC FEATURES (HCC): ICD-10-CM

## 2017-08-01 DIAGNOSIS — F11.20 UNCOMPLICATED OPIOID DEPENDENCE (HCC): ICD-10-CM

## 2017-08-01 DIAGNOSIS — F15.20 METHAMPHETAMINE DEPENDENCE (HCC): Primary | ICD-10-CM

## 2017-08-01 PROCEDURE — H0015 ALCOHOL AND/OR DRUG SERVICES: HCPCS | Performed by: COUNSELOR

## 2017-08-01 RX ORDER — BUPROPION HYDROCHLORIDE 150 MG/1
150 TABLET ORAL EVERY MORNING
Qty: 30 TABLET | Refills: 2 | Status: SHIPPED | OUTPATIENT
Start: 2017-08-01 | End: 2017-08-08

## 2017-08-01 NOTE — PROGRESS NOTES
".  IOP GROUP NOTE    DATA:    3 hour IOP group therapy session (changes)  Hour 1: Therapist continued facilitation of rapport building strategies between group members. Therapist asked that each patient check in with home life and recovery efforts and identify triggers, cravings, and  high risk situations that arise between group sessions.   Hour 2: Therapist facilitated psychoeducation on relapse prevention. Discussed becoming aware of \"danger signs\". Asked the group to discuss danger signs they have each experienced that have pushed them onto \"thin ice\" in recovery.   Hour 3: IOP graduation    (Self-reporting scales based on 0 - 10 with 10 being the worst)  Depression: 4/10 Anxiety: 0/10   Triggers: 1/10 Cravings: 1/10       ASSESSMENT:      Hygiene:   good  Cooperation:  Cooperative  Eye Contact:  Good  Affect:  normal affect  Speech:  Normal  Thought Progress:  Goal directed  Reliability:  fair  Insight:  fair  Judgement:  fair  Impulse Control:  fair    Family issues related to recovery:  family history of substance abuse, lack of education surrounding the disease model, perception of lack of support    12-Step attendance: yes  3 meeting minimum  per week    Sponsor: yes, temporary sponsor, but states she is looking for something more permanent.     Identification of environmental and personal barriers to recovery:  coping with negative emotions, overall understanding of disease of addiction, loss of independence, impulse control    Motivation for treatment:  healthy lifestyle, long-term, recovery, and improving overall relationships.    RESPONSE: Patient presented to group on time and was cooperative throughout session. During check-in, patient reported Feeling physically sore and tired.  States she is not sleeping well and has little to no energy.  Patient states emotionally feeling happy and spiritually needing to move forward step work but wanting to wait to find a permanent sponsor first.  Patient opened " up about becoming aware of her own danger signs.  Danger signs that she admits to experiencing that pushed her onto thin ice in recovery are unemployment, and not getting with the program enough i.e. slacking on her step work.    CLINICAL MANUVERING/INTERVENTIONS:  Patient was encouraged to bring family members for educational group on Wednesdays.  Patient was provided with encouragement and unconditional positive regard as patient opened up about struggles past and present.  Patient was encouraged to participate in 12-step group meetings and work the twelve steps with a sponsor.  Therapist encouraged patient to engage in problem solving abilities to identify and utilize healthy boundaries.    DASES SCORE:  59    PLAN:  Continue [facility] IOP Phase I.     Aftercare:  [facility], [facility] Phase II    Program Assignments:  Personal Journal, attendance of 12-step meetings, drug screens

## 2017-08-02 ENCOUNTER — DOCUMENTATION (OUTPATIENT)
Dept: PSYCHIATRY | Facility: CLINIC | Age: 27
End: 2017-08-02

## 2017-08-02 ENCOUNTER — OFFICE VISIT (OUTPATIENT)
Dept: PSYCHIATRY | Facility: HOSPITAL | Age: 27
End: 2017-08-02

## 2017-08-02 DIAGNOSIS — F11.20 UNCOMPLICATED OPIOID DEPENDENCE (HCC): ICD-10-CM

## 2017-08-02 DIAGNOSIS — F15.20 METHAMPHETAMINE DEPENDENCE (HCC): Primary | ICD-10-CM

## 2017-08-02 DIAGNOSIS — F32.2 MAJOR DEPRESSIVE DISORDER, SINGLE EPISODE, SEVERE WITHOUT PSYCHOTIC FEATURES (HCC): ICD-10-CM

## 2017-08-02 PROCEDURE — H0015 ALCOHOL AND/OR DRUG SERVICES: HCPCS | Performed by: PSYCHIATRY & NEUROLOGY

## 2017-08-02 NOTE — PROGRESS NOTES
.  IOP GROUP NOTE    DATA:    3 hour IOP group therapy session (changes)  Hour 1: Therapist continued facilitation of rapport building strategies between group members. Therapist asked that each patient check in with home life and recovery efforts and identify triggers, cravings, and  high risk situations that arise between group sessions.   Hour 2: Therapist facilitated psychoeducation on slick the voice of addiction.  [place] keeps the attic using chemicals through rationalization.  Rationalization is the explaining of actions or beliefs in a way that makes sense but does not reveal true motives.  The inner voice of addiction once addicts to believe that problems are related to something other than their addiction.  Hour 3: Group discussion followed on specific ways each group member rationalized continued drug use despite experiencing many consequences.  The group discussed how they rationalized painful consequences away, as if they didn't matter, such as job loss, damaged reputation, divorce, lost/strained relationships, debt, legal charges, etc.     (Self-reporting scales based on 0 - 10 with 10 being the worst)  Depression: 1/10 Anxiety: 0/10   Triggers: 0/10 Cravings: 0/10       ASSESSMENT:      Hygiene:   good  Cooperation:  Cooperative  Eye Contact:  Good  Affect:  normal affect  Speech:  Normal  Thought Progress:  Goal directed  Reliability:  fair  Insight:  fair  Judgement:  fair  Impulse Control:  fair    Family issues related to recovery:  family history of substance abuse, lack of education surrounding the disease model, perception of lack of support    12-Step attendance: yes  3 meeting minimum  per week    Sponsor: yes    Identification of environmental and personal barriers to recovery:  coping with negative emotions, overall understanding of disease of addiction, loss of independence, impulse control    Motivation for treatment:  healthy lifestyle, long-term, recovery, and improving overall  "relationships.    RESPONSE: Patient presented to group on time and was cooperative throughout session. During check-in, patient reported feeling physically a lot better than yesterday with more energy, emotionally grouchy, and spiritually good.  Patient states she got into a deep meditation with her higher power where she was able to connect yesterday.  Spiritual work coupled with medication change yesterday by Dr. Benton is what she identified as a reason for her overall good mood today.  Patient opened up about experiencing many consequences related to her addiction and rationalizing them away as if they did not matter.  Patient shared about the excuses that she made in her relationships in the past that ultimately landed and  from her  and 2 children, strained relationship with mother, and having \"no friends\".    CLINICAL MANUVERING/INTERVENTIONS:  Patient was encouraged to bring family members for educational group on Wednesdays.  Patient was provided with encouragement and unconditional positive regard as patient opened up about struggles past and present.  Patient was encouraged to participate in 12-step group meetings and work the twelve steps with a sponsor.  Therapist encouraged patient to engage in problem solving abilities to identify and utilize healthy boundaries.    DASES SCORE:  59    PLAN:  Continue [facility] IOP Phase I.     Aftercare:  [facility], IOP Phase II    Program Assignments:  Personal Journal, attendance of 12-step meetings, drug screens  "

## 2017-08-02 NOTE — PROGRESS NOTES
.Treatment Plan Reassessment (bi-weekly)     Long Term Goal: Patient will maintain total abstinence from mind-altering substances and utilize behavioral and cognitive coping skills to help maintain sobriety.      Relapse Risk: Patient presents with substance addiction and is at high risk for relapse. Patient’s use has negatively impacted social and occupational functioning.        Patient will exhibit an increase in DASES score indicating increased confidence in her ability to maintain sobriety.     Patient will identify situations, people, and places that are high-risk for relapse within two weeks of treatment.     Patient will attend group sessions as scheduled and participate by giving and receiving feedback.     Patient will develop a positive network of support by attending a minimum of three 12 step support groups each week and by obtaining a sponsor.     Patient will identify, practice, and implement at least 5 healthy coping strategies to manage difficult emotions while remaining abstinent.     Patient will show a decrease in score on the PH-Q depression scale indicating improvement in frequency of depressive symptoms.     Patient will demonstrate a decrease in score on the FRANK-7 questionnaire indicating improvement in frequency of anxiety symptoms.     Current DASES score: 59 Patient reports mild cravings.              Patient has encountered high-risk situations since beginning treatment and has discussed responses to these situations.       Patient has 0 unexcused absences and provides feedback daily.        Patient attends 12-step meetings at least three times per week. Patient does not have a sponsor.        Patient has practiced deep breathing, PMR, thinking twice, and removing self from situation during times of stress.           Current score on PH-Q is 11                 Current score on FRANK-7 is 8    Patient will continue working toward objective.             Partially completed. Patient   will  continue working toward objective.               Patient will continue working toward objective.        Patient will continue to maintain 12-step attendance and sponsorship         Partially completed. Patient will continue working toward objective.             Patient will continue working toward objective.               Patient will continue working toward objective.

## 2017-08-03 ENCOUNTER — OFFICE VISIT (OUTPATIENT)
Dept: PSYCHIATRY | Facility: HOSPITAL | Age: 27
End: 2017-08-03

## 2017-08-03 ENCOUNTER — LAB (OUTPATIENT)
Dept: LAB | Facility: HOSPITAL | Age: 27
End: 2017-08-03

## 2017-08-03 DIAGNOSIS — F15.20 METHAMPHETAMINE DEPENDENCE (HCC): Primary | ICD-10-CM

## 2017-08-03 DIAGNOSIS — F11.20 UNCOMPLICATED OPIOID DEPENDENCE (HCC): ICD-10-CM

## 2017-08-03 DIAGNOSIS — F32.2 MAJOR DEPRESSIVE DISORDER, SINGLE EPISODE, SEVERE WITHOUT PSYCHOTIC FEATURES (HCC): ICD-10-CM

## 2017-08-03 DIAGNOSIS — F15.20 OTHER STIMULANT DEPENDENCE, UNCOMPLICATED (HCC): ICD-10-CM

## 2017-08-03 PROCEDURE — H0015 ALCOHOL AND/OR DRUG SERVICES: HCPCS | Performed by: PSYCHIATRY & NEUROLOGY

## 2017-08-03 PROCEDURE — 80306 DRUG TEST PRSMV INSTRMNT: CPT | Performed by: PSYCHIATRY & NEUROLOGY

## 2017-08-03 NOTE — PROGRESS NOTES
.  IOP GROUP NOTE    DATA:    3 hour IOP group therapy session (changes)  Hour 1: Therapist continued facilitation of rapport building strategies between group members. Therapist asked that each patient check in with home life and recovery efforts and identify triggers, cravings, and  high risk situations that arise between group sessions.   Hours 2 & 3:  Therapist facilitated psychoeducation on goal setting in recovery.Most people find that it is easier to achieve things when they set themselves goals. This way they will have a clearer idea about what it is they are trying to achieve. When people are trapped in addiction their goals will revolve around getting drunk or high. This means that when they first become sober they can feel a bit purposeless. It will be important for them to establish new goals in sobriety that they can work towards. SMART is a useful mnemonic that provides people with guidance for how to set more effective goals. It suggests that goals should be:  * Specific - this means that the goal needs to be clear and unambiguous. For example, I want to pay debt $200 debt by December is better than I want to get out of debt.   * Measurable - a goal needs to be measurable in order to decide if progress is being made.  * Attainable - the goal needs to be realistic and achievable.  * Relevant - there has to be a good reason for achieving the goal. This is particularly important if it requires the input of other people.  * Timely - this means that it can be achieved within a timeframe  The mnemonic SMARTER goes on to add two more characteristics to effective goals:  * Evaluate  * Reevaluate     *Processed the above with group members and facilitated group discussion.     (Self-reporting scales based on 0 - 10 with 10 being the worst)  Depression: 1/10 Anxiety: 0/10   Triggers: 0/10 Cravings: 0/10       ASSESSMENT:      Hygiene:   good  Cooperation:  Cooperative  Eye Contact:  Good  Affect:  normal  affect  Speech:  Normal  Thought Progress:  Goal directed  Reliability:  fair  Insight:  fair  Judgement:  fair  Impulse Control:  fair    Family issues related to recovery:  family history of substance abuse, lack of education surrounding the disease model, perception of lack of support    12-Step attendance: yes  3 meeting minimum  per week    Sponsor: temporary    Identification of environmental and personal barriers to recovery:  coping with negative emotions, overall understanding of disease of addiction, loss of independence, impulse control    Motivation for treatment:  healthy lifestyle, long-term, recovery, and improving overall relationships.    RESPONSE: Patient presented to group on time and was cooperative throughout session. During check-in, patient reported feeling physically good, emotionally stressed because she overslept and now feels like her day started off rushed, and spiritually working on it.  Patient said during check-in she has been hanging with sober friends after meetings and laughing.  Patient states she cannot murmur last home that she laughed with friends.  She plans to attend at least 3 meetings over the weekend and look for a permanent sponsor.  Patient states she is not a ride with a friend to go to a meeting out of town and hopefully there she will meet new people.     CLINICAL MANUVERING/INTERVENTIONS:  Patient was encouraged to bring family members for educational group on Wednesdays.  Patient was provided with encouragement and unconditional positive regard as patient opened up about struggles past and present.  Patient was encouraged to participate in 12-step group meetings and work the twelve steps with a sponsor.  Therapist encouraged patient to engage in problem solving abilities to identify and utilize healthy boundaries.    DASES SCORE:  63    PLAN:  Continue [facility] IOP Phase I.     Aftercare:  [facility], [facility] Phase  II    Program Assignments:  Personal Journal, attendance of 12-step meetings, drug screens

## 2017-08-03 NOTE — PROGRESS NOTES
.Treatment Plan Reassessment (bi-weekly)     Long Term Goal: Patient will maintain total abstinence from mind-altering substances and utilize behavioral and cognitive coping skills to help maintain sobriety.      Relapse Risk: Patient presents with substance addiction and is at high risk for relapse. Patient’s use has negatively impacted social and occupational functioning.        Patient will exhibit an increase in DASES score indicating increased confidence in her ability to maintain sobriety.     Patient will identify situations, people, and places that are high-risk for relapse within two weeks of treatment.     Patient will attend group sessions as scheduled and participate by giving and receiving feedback.     Patient will develop a positive network of support by attending a minimum of three 12 step support groups each week and by obtaining a sponsor.     Patient will identify, practice, and implement at least 5 healthy coping strategies to manage difficult emotions while remaining abstinent.     Patient will show a decrease in score on the PH-Q depression scale indicating improvement in frequency of depressive symptoms.     Patient will demonstrate a decrease in score on the FRANK-7 questionnaire indicating improvement in frequency of anxiety symptoms.     Current DASES score: 63 Patient reports mild cravings.              Patient has encountered high-risk situations since beginning treatment and has discussed responses to these situations.       Patient has 0 unexcused absences and provides feedback daily.        Patient attends 12-step meetings at least three times per week. Patient has a sponsor and is working steps        Patient has practiced deep breathing, PMR, thinking twice, and removing self from situation during times of stress.           Current score on PH-Q is 6                 Current score on FRANK-7 is 9    Patient will continue working toward objective.             Partially completed. Patient  will continue working toward objective.             Patient will continue working toward objective.          Patient will continue to maintain 12-step attendance and sponsorship           Partially completed. Patient will continue working toward objective.           Patient will continue working toward objective.               Patient will continue working toward objective.

## 2017-08-07 ENCOUNTER — OFFICE VISIT (OUTPATIENT)
Dept: PSYCHIATRY | Facility: HOSPITAL | Age: 27
End: 2017-08-07

## 2017-08-07 ENCOUNTER — LAB (OUTPATIENT)
Dept: LAB | Facility: HOSPITAL | Age: 27
End: 2017-08-07

## 2017-08-07 DIAGNOSIS — F11.20 UNCOMPLICATED OPIOID DEPENDENCE (HCC): ICD-10-CM

## 2017-08-07 DIAGNOSIS — F15.20 METHAMPHETAMINE DEPENDENCE (HCC): Primary | ICD-10-CM

## 2017-08-07 DIAGNOSIS — F32.2 MAJOR DEPRESSIVE DISORDER, SINGLE EPISODE, SEVERE WITHOUT PSYCHOTIC FEATURES (HCC): ICD-10-CM

## 2017-08-07 DIAGNOSIS — F15.20 OTHER STIMULANT DEPENDENCE, UNCOMPLICATED (HCC): ICD-10-CM

## 2017-08-07 PROCEDURE — H0015 ALCOHOL AND/OR DRUG SERVICES: HCPCS | Performed by: PSYCHIATRY & NEUROLOGY

## 2017-08-07 PROCEDURE — 80306 DRUG TEST PRSMV INSTRMNT: CPT | Performed by: PSYCHIATRY & NEUROLOGY

## 2017-08-07 NOTE — PROGRESS NOTES
".  IOP GROUP NOTE    DATA:    3 hour IOP group therapy session (changes)  Hour 1: Therapist continued facilitation of rapport building strategies between group members. Therapist asked that each patient check in with home life and recovery efforts and identify triggers, cravings, and  high risk situations that arise between group sessions.   Hour 2: Therapist facilitated psychoeducation on relapse prevention. Provided tips on ways to avoid boredom in recovery.   Hour 3: 12-step program      (Self-reporting scales based on 0 - 10 with 10 being the worst)  Depression: 3/10 Anxiety: 8/10   Triggers: 2/10 Cravings: 1/10       ASSESSMENT:      Hygiene:   good  Cooperation:  Cooperative  Eye Contact:  Good  Affect:  normal affect  Speech:  Normal  Thought Progress:  Goal directed  Reliability:  fair  Insight:  fair  Judgement:  fair  Impulse Control:  fair    Family issues related to recovery:  family history of substance abuse, lack of education surrounding the disease model, perception of lack of support    12-Step attendance: yes  3 meeting minimum  per week    Sponsor: yes - found permanent sponsor yesterday.    Identification of environmental and personal barriers to recovery:  coping with negative emotions, overall understanding of disease of addiction, loss of independence, impulse control    Motivation for treatment:  healthy lifestyle, long-term, recovery, and improving overall relationships.    RESPONSE: Patient presented to group on time and was cooperative throughout session. During check-in, patient reported physically feeling \"shakey\". She reports having tremors in her arms and hands 80% of the time or more. Emotionally she reports feeling \"horrible paranoia\" that \"no one is taking me seriously\" or \"my family not respecting my recovery\". Patient states concern with becoming so angry that she may black out and do something to hurt herself or someone else. She denies SI/HI today but states it wouldn't surprise " "her if it happened when she becomes angry. Patient states she feels disconnected and numb today. She explained how her mother is the only person who can bring her out of these states by \"she shakes me and slaps me like the scene from Airplane and it works to snap me out of it\".    CLINICAL MANUVERING/INTERVENTIONS:  Patient was encouraged to bring family members for educational group on Wednesdays.  Patient was provided with encouragement and unconditional positive regard as patient opened up about struggles past and present.  Patient was encouraged to participate in 12-step group meetings and work the twelve steps with a sponsor.  Therapist encouraged patient to engage in problem solving abilities to identify and utilize healthy boundaries.    DASES SCORE:  63    PLAN:  Continue [facility] IOP Phase I.     Aftercare:  [facility], IOP Phase II    Program Assignments:  Personal Journal, attendance of 12-step meetings, drug screens  "

## 2017-08-08 ENCOUNTER — OFFICE VISIT (OUTPATIENT)
Dept: PSYCHIATRY | Facility: HOSPITAL | Age: 27
End: 2017-08-08

## 2017-08-08 VITALS — BODY MASS INDEX: 22.5 KG/M2 | HEIGHT: 66 IN | WEIGHT: 140 LBS

## 2017-08-08 DIAGNOSIS — F33.2 SEVERE EPISODE OF RECURRENT MAJOR DEPRESSIVE DISORDER, WITHOUT PSYCHOTIC FEATURES (HCC): ICD-10-CM

## 2017-08-08 DIAGNOSIS — F15.20 METHAMPHETAMINE DEPENDENCE (HCC): Primary | ICD-10-CM

## 2017-08-08 DIAGNOSIS — F43.10 POST TRAUMATIC STRESS DISORDER (PTSD): ICD-10-CM

## 2017-08-08 DIAGNOSIS — F11.20 UNCOMPLICATED OPIOID DEPENDENCE (HCC): ICD-10-CM

## 2017-08-08 PROCEDURE — H0015 ALCOHOL AND/OR DRUG SERVICES: HCPCS | Performed by: PSYCHIATRY & NEUROLOGY

## 2017-08-08 PROCEDURE — 99213 OFFICE O/P EST LOW 20 MIN: CPT | Performed by: PSYCHIATRY & NEUROLOGY
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
## 2017-08-08 NOTE — PROGRESS NOTES
Patient was not seen today however still is complaining of fatigue.  During her appointment last week, we discussed starting Wellbutrin for her fatigue and depressive symptoms.  She requested to start this today and a prescription was sent to her pharmacy
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
## 2017-08-09 ENCOUNTER — OFFICE VISIT (OUTPATIENT)
Dept: PSYCHIATRY | Facility: HOSPITAL | Age: 27
End: 2017-08-09

## 2017-08-09 DIAGNOSIS — F15.20 METHAMPHETAMINE DEPENDENCE (HCC): ICD-10-CM

## 2017-08-09 DIAGNOSIS — F43.10 POST TRAUMATIC STRESS DISORDER (PTSD): ICD-10-CM

## 2017-08-09 DIAGNOSIS — F33.2 SEVERE EPISODE OF RECURRENT MAJOR DEPRESSIVE DISORDER, WITHOUT PSYCHOTIC FEATURES (HCC): Primary | ICD-10-CM

## 2017-08-09 DIAGNOSIS — F11.20 UNCOMPLICATED OPIOID DEPENDENCE (HCC): ICD-10-CM

## 2017-08-09 PROCEDURE — H0015 ALCOHOL AND/OR DRUG SERVICES: HCPCS | Performed by: PSYCHIATRY & NEUROLOGY

## 2017-08-09 NOTE — PROGRESS NOTES
.  IOP GROUP NOTE    DATA:    3 hour IOP group therapy session (changes)  Hour 1: Therapist continued facilitation of rapport building strategies between group members. Therapist asked that each patient check in with home life and recovery efforts and identify triggers, cravings, and  high risk situations that arise between group sessions.   Hour 2: Therapist facilitated psychoeducation on problem and solution concept and facilitated group discussion  Hour 3: Therapist explained the importance of meetings, support groups, finding a home group, service work, reading in the big book, and finding a sponsor.      (Self-reporting scales based on 0 - 10 with 10 being the worst)  Depression: 5/10 Anxiety: 1/10   Triggers: 0/10 Cravings: 0/10       ASSESSMENT:      Hygiene:   good  Cooperation:  Cooperative  Eye Contact:  Good  Affect:  normal affect  Speech:  Normal  Thought Progress:  Goal directed  Reliability:  fair  Insight:  fair  Judgement:  fair  Impulse Control:  fair    Family issues related to recovery:  family history of substance abuse, lack of education surrounding the disease model, perception of lack of support    12-Step attendance: yes  3 meeting minimum  per week    Sponsor: yes    Identification of environmental and personal barriers to recovery:  coping with negative emotions, overall understanding of disease of addiction, loss of independence, impulse control    Motivation for treatment:  healthy lifestyle, long-term, recovery, and improving overall relationships.    RESPONSE: Patient presented to group on time and was cooperative throughout session. During check-in, patient reported Physically and emotionally feeling great today.  She celebrated 30 day sobriety yesterday.  Patient states she went to her home group and got her chip and was very rewarding.  She went out with some friends from her support group to the park and to get ice cream.  Patient states she is learning to make friends with women  which is never been the case in the past.  She said she typically only hangs out with men because she can manipulate them.  Patient said she is embarrassed about that but feels better being honest in therapy now and would like to work on the issues that she's had in the past with men.  Patient states being depressed what is going on in her life that compose the biggest stress to her recovery.  She is looking forward to new medication regimen.  Patient states Dr. Benton would like to see her next week to follow up on recent changes.    CLINICAL MANUVERING/INTERVENTIONS:  Patient was encouraged to bring family members for educational group on Wednesdays.  Patient was provided with encouragement and unconditional positive regard as patient opened up about struggles past and present.  Patient was encouraged to participate in 12-step group meetings and work the twelve steps with a sponsor.  Therapist encouraged patient to engage in problem solving abilities to identify and utilize healthy boundaries.    DASES SCORE:  63    PLAN:  Continue [facility] Phase I.     Aftercare:  [facility]    Program Assignments:  Personal Journal, attendance of 12-step meetings, drug screens

## 2017-08-10 ENCOUNTER — OFFICE VISIT (OUTPATIENT)
Dept: PSYCHIATRY | Facility: HOSPITAL | Age: 27
End: 2017-08-10

## 2017-08-10 ENCOUNTER — LAB (OUTPATIENT)
Dept: LAB | Facility: HOSPITAL | Age: 27
End: 2017-08-10

## 2017-08-10 DIAGNOSIS — F32.2 MAJOR DEPRESSIVE DISORDER, SINGLE EPISODE, SEVERE WITHOUT PSYCHOTIC FEATURES (HCC): ICD-10-CM

## 2017-08-10 DIAGNOSIS — F11.20 UNCOMPLICATED OPIOID DEPENDENCE (HCC): ICD-10-CM

## 2017-08-10 DIAGNOSIS — F15.20 METHAMPHETAMINE DEPENDENCE (HCC): ICD-10-CM

## 2017-08-10 DIAGNOSIS — F33.2 SEVERE EPISODE OF RECURRENT MAJOR DEPRESSIVE DISORDER, WITHOUT PSYCHOTIC FEATURES (HCC): Primary | ICD-10-CM

## 2017-08-10 DIAGNOSIS — F15.20 OTHER STIMULANT DEPENDENCE, UNCOMPLICATED (HCC): ICD-10-CM

## 2017-08-10 DIAGNOSIS — F43.10 POST TRAUMATIC STRESS DISORDER (PTSD): ICD-10-CM

## 2017-08-10 PROCEDURE — H0015 ALCOHOL AND/OR DRUG SERVICES: HCPCS | Performed by: PSYCHIATRY & NEUROLOGY

## 2017-08-10 PROCEDURE — H0015 ALCOHOL AND/OR DRUG SERVICES: HCPCS | Performed by: COUNSELOR

## 2017-08-10 PROCEDURE — 80306 DRUG TEST PRSMV INSTRMNT: CPT | Performed by: PSYCHIATRY & NEUROLOGY

## 2017-08-10 NOTE — PROGRESS NOTES
".  IOP GROUP NOTE    DATA:    3 hour IOP group therapy session (changes)  Hour 1: Therapist continued facilitation of rapport building strategies between group members. Therapist asked that each patient check in with home life and recovery efforts and identify triggers, cravings, and  high risk situations that arise between group sessions.   Hour 2: Therapist facilitated psychoeducation on the perseverance of addiction. Addiction is very patient. Its voice will stay silent for years, even decades, waiting for that right opportunity to say the right thing to trigger a relapse. The Big Book makes this clear: \"We are not cured of alcoholism. What we really have is a daily reprieve contingent on the maintenance of our spiritual connection\" (page 85).  Hour 3: Therapist engaged group is conversation about vigorously working the Steps and making lifestyle changes consistent with recovery to improve chances of staying sober.  Therapist warned patients about common issues ultimately agreed folks in recovery to relapse.  These included reducing or eliminating contact with the steps, reducing involvement with other recovering people, slippery behavior, criminal behavior, and believing that your long history of abstinence proves now that he can control drinking and using by just having one drink or one hit.       (Self-reporting scales based on 0 - 10 with 10 being the worst)  Depression: 1/10 Anxiety: 0/10   Triggers: 0/10 Cravings: 0/10       ASSESSMENT:      Hygiene:   good  Cooperation:  Cooperative  Eye Contact:  Good  Affect:  normal affect  Speech:  Normal  Thought Progress:  Goal directed  Reliability:  fair  Insight:  fair  Judgement:  fair  Impulse Control:  fair    Family issues related to recovery:  family history of substance abuse, lack of education surrounding the disease model, perception of lack of support    12-Step attendance: yes  3 meeting minimum  per week    Sponsor: yes    Identification of " "environmental and personal barriers to recovery:  coping with negative emotions, overall understanding of disease of addiction, loss of independence, impulse control    Motivation for treatment:  healthy lifestyle, long-term, recovery, and improving overall relationships.    RESPONSE: Patient presented to group on time and was cooperative throughout session. During check-in, patient reported feeling great today.  She set up a meeting after group today with her sponsor so they could go over a plan to begin step work.  Patient continues to share about 12 step meetings that she attends every night.  Patient with a much more positive attitude today stating \"I deserve a happy, healthy life and my children deserve a happy, healthy mother\".    CLINICAL MANUVERING/INTERVENTIONS:  Patient was encouraged to bring family members for educational group on Wednesdays.  Patient was provided with encouragement and unconditional positive regard as patient opened up about struggles past and present.  Patient was encouraged to participate in 12-step group meetings and work the twelve steps with a sponsor.  Therapist encouraged patient to engage in problem solving abilities to identify and utilize healthy boundaries.    DASES SCORE:  63    PLAN:  Continue [facility] Phase I.     Aftercare:  [facility] Phase II    Program Assignments:  Personal Journal, attendance of 12-step meetings, drug screens  "

## 2017-08-14 ENCOUNTER — OFFICE VISIT (OUTPATIENT)
Dept: PSYCHIATRY | Facility: HOSPITAL | Age: 27
End: 2017-08-14

## 2017-08-14 ENCOUNTER — LAB (OUTPATIENT)
Dept: LAB | Facility: HOSPITAL | Age: 27
End: 2017-08-14

## 2017-08-14 DIAGNOSIS — F11.20 UNCOMPLICATED OPIOID DEPENDENCE (HCC): ICD-10-CM

## 2017-08-14 DIAGNOSIS — F33.2 SEVERE EPISODE OF RECURRENT MAJOR DEPRESSIVE DISORDER, WITHOUT PSYCHOTIC FEATURES (HCC): Primary | ICD-10-CM

## 2017-08-14 DIAGNOSIS — F15.20 METHAMPHETAMINE DEPENDENCE (HCC): ICD-10-CM

## 2017-08-14 DIAGNOSIS — F32.2 MAJOR DEPRESSIVE DISORDER, SINGLE EPISODE, SEVERE WITHOUT PSYCHOTIC FEATURES (HCC): ICD-10-CM

## 2017-08-14 DIAGNOSIS — F15.20 OTHER STIMULANT DEPENDENCE, UNCOMPLICATED (HCC): ICD-10-CM

## 2017-08-14 DIAGNOSIS — F43.10 POST TRAUMATIC STRESS DISORDER (PTSD): ICD-10-CM

## 2017-08-14 PROCEDURE — H0015 ALCOHOL AND/OR DRUG SERVICES: HCPCS | Performed by: PSYCHIATRY & NEUROLOGY

## 2017-08-14 PROCEDURE — 80306 DRUG TEST PRSMV INSTRMNT: CPT | Performed by: PSYCHIATRY & NEUROLOGY

## 2017-08-14 NOTE — PROGRESS NOTES
".  IOP GROUP NOTE    DATA:    3 hour IOP group therapy session (changes)  Hour 1: Therapist continued facilitation of rapport building strategies between group members. Therapist asked that each patient check in with home life and recovery efforts and identify triggers, cravings, and  high risk situations that arise between group sessions.   Hour 2: Therapist facilitated interactive activity using coping skills cards to address how members manage anger, guilt, and failing relationships. The cognitive behavioral therapy (CBT) game is used for small group counseling designed to teach key cognitive behavioral skills such as identifying triggers, negative thoughts, helpful coping strategies.  Hour 3: Continued activity using coping skills cards.     (Self-reporting scales based on 0 - 10 with 10 being the worst)  Depression: 6/10 Anxiety: 3/10   Triggers: 2/10 Cravings: 7/10       ASSESSMENT:      Hygiene:   good  Cooperation:  Cooperative  Eye Contact:  Good  Affect:  normal affect  Speech:  Normal  Thought Progress:  Goal directed  Reliability:  fair  Insight:  fair  Judgement:  fair  Impulse Control:  fair    Family issues related to recovery:  family history of substance abuse, lack of education surrounding the disease model, perception of lack of support    12-Step attendance: yes  3 meeting minimum  per week    Sponsor: yes    Identification of environmental and personal barriers to recovery:  coping with negative emotions, overall understanding of disease of addiction, loss of independence, impulse control    Motivation for treatment:  healthy lifestyle, long-term, recovery, and improving overall relationships.    RESPONSE: Patient presented to group on time and was cooperative throughout session. During check-in, patient reported getting mad enough to \"snap\" over the weekend.  Patient further divulged about a relationship that she has been in for the past month with a man she met in AA.  He broke things off and " "started seeing another woman.  Patient states she feels hurt and disrespected even though she knew the dangers of starting a relationship in early recovery, she explained feeling like \"the exception to the rule\".  Patient states she had homicidal thoughts over the weekend toward her ex's new girlfriend.  She has a history of suicidal and homicidal thoughts however today adamantly denies SI/HI and said \"she is not worth sitting in a care home cell over for the rest of my life so I'm not going to do anything\".  Patient states she feels safe and secure today, no thoughts of hurting herself or others.  She talked openly about her thoughts of physically harming the new girlfriend over the weekend by using a dark spell (witchcraft) or physically fighting her.  Patient states she coped with these destructive thoughts by attending a meeting and talking with her sponsor afterwards.  Therapist assessed for current homicidality or suicidality and the possible need for higher level of care.  Patient states she feels safe and that she will not do anything to harm herself or others today; those feelings were this past weekend. She has an appointment tomorrow morning with MORTEZA Benton psychiatrist.  Patient also verbally agreed to go to the emergency room should these thoughts of SI or HI return.  She has experienced increased cravings for alcohol, specifically vodka over the weekend.  Patient said she can taste it even at times and she is been having dreams about drinking every night.    CLINICAL MANUVERING/INTERVENTIONS:  Patient was encouraged to bring family members for educational group on Wednesdays.  Patient was provided with encouragement and unconditional positive regard as patient opened up about struggles past and present.  Patient was encouraged to participate in 12-step group meetings and work the twelve steps with a sponsor.  Therapist encouraged patient to engage in problem solving abilities to identify and utilize " healthy boundaries.    DASES SCORE:  63    PLAN:  Continue [facility] Phase I.     Aftercare:  [facility] Phase II    Program Assignments:  Personal Journal, attendance of 12-step meetings, drug screens

## 2017-08-15 ENCOUNTER — OFFICE VISIT (OUTPATIENT)
Dept: PSYCHIATRY | Facility: HOSPITAL | Age: 27
End: 2017-08-15

## 2017-08-15 VITALS — BODY MASS INDEX: 23.14 KG/M2 | WEIGHT: 144 LBS | HEIGHT: 66 IN

## 2017-08-15 DIAGNOSIS — F15.20 METHAMPHETAMINE DEPENDENCE (HCC): ICD-10-CM

## 2017-08-15 DIAGNOSIS — F43.10 POST TRAUMATIC STRESS DISORDER (PTSD): Primary | ICD-10-CM

## 2017-08-15 DIAGNOSIS — F11.20 UNCOMPLICATED OPIOID DEPENDENCE (HCC): ICD-10-CM

## 2017-08-15 DIAGNOSIS — F33.2 MDD (MAJOR DEPRESSIVE DISORDER), RECURRENT SEVERE, WITHOUT PSYCHOSIS (HCC): ICD-10-CM

## 2017-08-15 PROCEDURE — H0015 ALCOHOL AND/OR DRUG SERVICES: HCPCS | Performed by: PSYCHIATRY & NEUROLOGY

## 2017-08-15 PROCEDURE — 99213 OFFICE O/P EST LOW 20 MIN: CPT | Performed by: PSYCHIATRY & NEUROLOGY

## 2017-08-15 RX ORDER — SERTRALINE HYDROCHLORIDE 100 MG/1
150 TABLET, FILM COATED ORAL DAILY
Qty: 45 TABLET | Refills: 0 | Status: SHIPPED | OUTPATIENT
Start: 2017-08-15 | End: 2017-12-05

## 2017-08-15 NOTE — PROGRESS NOTES
"Outpatient Psychiatric Progress Note    CC: f/u MDD, opiate dependence.     HX: The patient was seen today after having a difficult weekend.  She reported that at a 12-step meeting last week, she had \"homicidal thoughts\" toward another female.  The patient tells me that the kolton she had been dating for several months ended their relationship so that he could \"work on his recovery\" and she was under the impression that he would remain abstinent as well.  However, he started dating this other individual and the patient found herself fantasizing about beating the female up.  She was not confrontational and she denied an intent on actually trying to kill the other girl.  She left the meeting and called a peer support.  She then discussed this with the therapist yesterday.  The patient found this emotion overwhelming and unsettling.  She did not use though had increasing craving over the weekend and drug dreams.  The patient reports anxiety has improved since stopping Wellbutrin.  She feels like her mood is stable today denies any homicidal or subtle thoughts today.     Sleep- fair with trazodone  Craving 2/10  I have reviewed pt's allergies and current medications.     Review of Systems   Constitutional: Negative.    Cardiovascular: Negative.    Gastrointestinal: Negative.    Neurological: Negative.      Ht 66\" (167.6 cm)  Wt 144 lb (65.3 kg)  BMI 23.24 kg/m2    EXAM: Neatly, casually dressed, good hygeine. Gait and station stable. No psychomotor agitation/retardation. No motor tics Speech is normal rate, amount. Associations intact. Mood \"okay\" affect euthymic, stable.. TC-goal directed.  Denies SI/HI/VH/AH. TP-linear.  Attention and concentration are fair. Memory is intact for recent and remote events. Insight-limited, judgement-fair      Encounter Diagnoses   Name Primary?   • Post traumatic stress disorder (PTSD) Yes   • Methamphetamine dependence    • Uncomplicated opioid dependence    • MDD (major depressive " disorder), recurrent severe, without psychosis    r/ o Borderline personality disorder    Current Outpatient Prescriptions   Medication Sig Dispense Refill   • sertraline (ZOLOFT) 100 MG tablet Take 1.5 tablets by mouth Daily. 45 tablet 0   • traZODone (DESYREL) 100 MG tablet Take 1 tablet by mouth At Night As Needed for Sleep. 30 tablet 2     No current facility-administered medications for this visit.      Plan:  1. I spent time validating the patient's emotions and encouraging her mature behaviors such as calling a friend and talking to her therapist set of doing self-destructive behaviors such as actually getting in a fight or using.  We also briefly touched on the idea of displaced anger.  2 increase Zoloft to 150 mg  3.  Discussed headache may be related to recent medication changes and using over-the-counter analgesics is reasonable at this time  4.  Tinea IOP        The risks, benefits, and treatment alternatives were discussed with the patient.     TIME IN:844AM  TIME OUT:902AM

## 2017-08-15 NOTE — PROGRESS NOTES
.  IOP GROUP NOTE    DATA:    3 hour IOP group therapy session (changes)  Hour 1: Therapist continued facilitation of rapport building strategies between group members. Therapist asked that each patient check in with home life and recovery efforts and identify triggers, cravings, and  high risk situations that arise between group sessions.   Hour 2: Therapist facilitated psychoeducation on why staying busy helps during recovery.  Group discussion focused on some of the things that members can do to help keep her mind off source of addiction.  Therapist discussed finding that feeling of accomplishment being enough to keep moving forward on the efforts to overcome your addiction.  Hour 3: Group members created a list of activities they can try. This list will help them to stay focused on what they are working on and allow them to avoid feeling like they need to use or drink.     (Self-reporting scales based on 0 - 10 with 10 being the worst)  Depression: 1/10 Anxiety: 0/10   Triggers: 0/10 Cravings: 2/10       ASSESSMENT:      Hygiene:   good  Cooperation:  Cooperative  Eye Contact:  Good  Affect:  normal affect  Speech:  Normal  Thought Progress:  Goal directed  Reliability:  fair  Insight:  fair  Judgement:  fair  Impulse Control:  fair    Family issues related to recovery:  family history of substance abuse, lack of education surrounding the disease model, perception of lack of support    12-Step attendance: yes  3 meeting minimum  per week    Sponsor: yes    Identification of environmental and personal barriers to recovery:  coping with negative emotions, overall understanding of disease of addiction, loss of independence, impulse control    Motivation for treatment:  healthy lifestyle, long-term, recovery, and improving overall relationships.    RESPONSE: Patient presented to group on time and was cooperative throughout session. During check-in, patient reported she has been focusing on the wrong things for  example dating but she wants to refocus on herself.  Patient states she is overall having a great day because it is her birthday today.  She said this is the first birthday in 15 years she has been sober.  She plans to spend the evening with her mom and her grandmother both of whom are very supportive of her recovery efforts.  Patient states she's no longer having the negative thoughts of wanting to harm the girl her ex-boyfriend now dating.  She identified the consequences would be damaging to her recovery and mental health.    CLINICAL MANUVERING/INTERVENTIONS:  Patient was encouraged to bring family members for educational group on Wednesdays.  Patient was provided with encouragement and unconditional positive regard as patient opened up about struggles past and present.  Patient was encouraged to participate in 12-step group meetings and work the twelve steps with a sponsor.  Therapist encouraged patient to engage in problem solving abilities to identify and utilize healthy boundaries.    DASES SCORE:  63    PLAN:  Continue [facility] Phase I.     Aftercare:  [facility] Phase II    Program Assignments:  Personal Journal, attendance of 12-step meetings, drug screens

## 2017-08-17 ENCOUNTER — LAB (OUTPATIENT)
Dept: LAB | Facility: HOSPITAL | Age: 27
End: 2017-08-17

## 2017-08-17 ENCOUNTER — OFFICE VISIT (OUTPATIENT)
Dept: PSYCHIATRY | Facility: HOSPITAL | Age: 27
End: 2017-08-17

## 2017-08-17 DIAGNOSIS — F32.2 MAJOR DEPRESSIVE DISORDER, SINGLE EPISODE, SEVERE WITHOUT PSYCHOTIC FEATURES (HCC): ICD-10-CM

## 2017-08-17 DIAGNOSIS — F15.20 METHAMPHETAMINE DEPENDENCE (HCC): Primary | ICD-10-CM

## 2017-08-17 DIAGNOSIS — F15.20 OTHER STIMULANT DEPENDENCE, UNCOMPLICATED (HCC): ICD-10-CM

## 2017-08-17 DIAGNOSIS — F33.2 MDD (MAJOR DEPRESSIVE DISORDER), RECURRENT SEVERE, WITHOUT PSYCHOSIS (HCC): ICD-10-CM

## 2017-08-17 DIAGNOSIS — F11.20 UNCOMPLICATED OPIOID DEPENDENCE (HCC): ICD-10-CM

## 2017-08-17 DIAGNOSIS — F43.10 POST TRAUMATIC STRESS DISORDER (PTSD): ICD-10-CM

## 2017-08-17 PROCEDURE — H0015 ALCOHOL AND/OR DRUG SERVICES: HCPCS | Performed by: PSYCHIATRY & NEUROLOGY

## 2017-08-17 PROCEDURE — H0015 ALCOHOL AND/OR DRUG SERVICES: HCPCS | Performed by: COUNSELOR

## 2017-08-17 PROCEDURE — 80306 DRUG TEST PRSMV INSTRMNT: CPT | Performed by: PSYCHIATRY & NEUROLOGY

## 2017-08-21 ENCOUNTER — OFFICE VISIT (OUTPATIENT)
Dept: PSYCHIATRY | Facility: HOSPITAL | Age: 27
End: 2017-08-21

## 2017-08-21 ENCOUNTER — LAB (OUTPATIENT)
Dept: LAB | Facility: HOSPITAL | Age: 27
End: 2017-08-21

## 2017-08-21 DIAGNOSIS — F33.2 MDD (MAJOR DEPRESSIVE DISORDER), RECURRENT SEVERE, WITHOUT PSYCHOSIS (HCC): ICD-10-CM

## 2017-08-21 DIAGNOSIS — F43.10 POST TRAUMATIC STRESS DISORDER (PTSD): ICD-10-CM

## 2017-08-21 DIAGNOSIS — F15.20 OTHER STIMULANT DEPENDENCE, UNCOMPLICATED (HCC): ICD-10-CM

## 2017-08-21 DIAGNOSIS — F11.20 UNCOMPLICATED OPIOID DEPENDENCE (HCC): ICD-10-CM

## 2017-08-21 DIAGNOSIS — F15.20 METHAMPHETAMINE DEPENDENCE (HCC): Primary | ICD-10-CM

## 2017-08-21 DIAGNOSIS — F32.2 MAJOR DEPRESSIVE DISORDER, SINGLE EPISODE, SEVERE WITHOUT PSYCHOTIC FEATURES (HCC): ICD-10-CM

## 2017-08-21 PROCEDURE — 80306 DRUG TEST PRSMV INSTRMNT: CPT | Performed by: PSYCHIATRY & NEUROLOGY

## 2017-08-21 PROCEDURE — H0015 ALCOHOL AND/OR DRUG SERVICES: HCPCS | Performed by: PSYCHIATRY & NEUROLOGY

## 2017-08-21 NOTE — PROGRESS NOTES
".  IOP GROUP NOTE    DATA:    3 hour IOP group therapy session (changes)  Hour 1: Therapist continued facilitation of rapport building strategies between group members. Therapist asked that each patient check in with home life and recovery efforts and identify triggers, cravings, and  high risk situations that arise between group sessions.   Hour 2: Therapist facilitated psychoeducation on codependency.  Therapist explained the codependency is learned behavior that can be passed down from generation to another.  It is an emotional and behavioral condition that affects and individuals ability to have a healthy, mutually satisfying relationship.  It is also known as \"relationship addiction\" because people with codependency often form or maintain relationships that are one-sided, emotionally destructive and/or abusive.  Codependent behavior is learned by watching and imitating other family members to display this type of behavior.  Facilitated group discussion on characteristics of codependent people.  Group members then completed a questionnaire to identify signs of codependency.    Hour 3: 12-step program     (Self-reporting scales based on 0 - 10 with 10 being the worst)  Depression: 2/10 Anxiety: 3/10   Triggers: 1/10 Cravings: 1/10       ASSESSMENT:      Hygiene:   good  Cooperation:  Cooperative  Eye Contact:  Good  Affect:  normal affect  Speech:  Normal  Thought Progress:  Goal directed  Reliability:  fair  Insight:  fair  Judgement:  fair  Impulse Control:  fair    Family issues related to recovery:  family history of substance abuse, lack of education surrounding the disease model, perception of lack of support    12-Step attendance: yes  3 meeting minimum  per week    Sponsor: yes - admits to \"slacking on calling her\".     Identification of environmental and personal barriers to recovery:  coping with negative emotions, overall understanding of disease of addiction, loss of independence, impulse " "control    Motivation for treatment:  healthy lifestyle, long-term, recovery, and improving overall relationships.    RESPONSE: Patient presented to group on time and was cooperative throughout session. During check-in, patient reported feeling restless today because she is not sleeping well.  Patient explained tossing and turning all night and unable to stay asleep.  She got a job and said money should not be much of a stressor anymore but she is worried that her new place of employment is risky for her recovery.  She took a job as a  at a local sports [employer].  Patient said she is worried that if she gets stressed, angry, or any strong emotion that drugs and alcohol will be right there and she may use.  She is also required to serve copious amounts of alcohol throughout her shift.  Patient states she heard [facility] was also hiring and they do not serve alcohol.  She plans to contact them about a job today.  Patient opened up during group discussion about her history of codependency in the past.  She identifies as \"very codependent on men\" and even identifies that as a secondary addiction.    CLINICAL MANUVERING/INTERVENTIONS:  Patient was encouraged to bring family members for educational group on Wednesdays.  Patient was provided with encouragement and unconditional positive regard as patient opened up about struggles past and present.  Patient was encouraged to participate in 12-step group meetings and work the twelve steps with a sponsor.  Therapist encouraged patient to engage in problem solving abilities to identify and utilize healthy boundaries.    DASES SCORE:  63    PLAN:  Continue [facility] Phase I.     Aftercare:  [facility] Phase II    Program Assignments:  Personal Journal, attendance of 12-step meetings, drug screens  "

## 2017-08-21 NOTE — TREATMENT PLAN
.Treatment Plan Reassessment (bi-weekly)     Long Term Goal: Patient will maintain total abstinence from mind-altering substances and utilize behavioral and cognitive coping skills to help maintain sobriety.      Relapse Risk: Patient presents with substance addiction and is at high risk for relapse. Patient’s use has negatively impacted social and occupational functioning.        Patient will exhibit an increase in DASES score indicating increased confidence in her ability to maintain sobriety.     Patient will identify situations, people, and places that are high-risk for relapse within two weeks of treatment.     Patient will attend group sessions as scheduled and participate by giving and receiving feedback.     Patient will develop a positive network of support by attending a minimum of three 12 step support groups each week and by obtaining a sponsor.     Patient will identify, practice, and implement at least 5 healthy coping strategies to manage difficult emotions while remaining abstinent.     Patient will show a decrease in score on the PH-Q depression scale indicating improvement in frequency of depressive symptoms.     Patient will demonstrate a decrease in score on the FRANK-7 questionnaire indicating improvement in frequency of anxiety symptoms.     Current DASES score: 63 Patient reports mild cravings.              Patient has encountered high-risk situations since beginning treatment and has discussed responses to these situations.     Patient has 1 unexcused absence and provides feedback daily.        Patient attends 12-step meetings at least three times per week. Patient has a sponsor and is working steps        Patient has practiced deep breathing, PMR, thinking twice, and removing self from situation during times of stress.           Current score on PH-Q is 6                 Current score on FRANK-7 is 9    Patient will continue working toward objective.             Partially completed. Patient  will continue working toward objective.           Patient will continue working toward objective.          Patient will continue to maintain 12-step attendance and sponsorship           Partially completed. Patient will continue working toward objective.           Patient will continue working toward objective.               Patient will continue working toward objective.              I have discussed and reviewed this treatment plan with the patient.  It has been printed for signatures.

## 2017-08-22 ENCOUNTER — OFFICE VISIT (OUTPATIENT)
Dept: PSYCHIATRY | Facility: HOSPITAL | Age: 27
End: 2017-08-22

## 2017-08-22 DIAGNOSIS — F11.20 UNCOMPLICATED OPIOID DEPENDENCE (HCC): ICD-10-CM

## 2017-08-22 DIAGNOSIS — F15.20 METHAMPHETAMINE DEPENDENCE (HCC): Primary | ICD-10-CM

## 2017-08-22 DIAGNOSIS — F43.10 POST TRAUMATIC STRESS DISORDER (PTSD): ICD-10-CM

## 2017-08-22 DIAGNOSIS — F33.2 MDD (MAJOR DEPRESSIVE DISORDER), RECURRENT SEVERE, WITHOUT PSYCHOSIS (HCC): ICD-10-CM

## 2017-08-22 PROCEDURE — H0015 ALCOHOL AND/OR DRUG SERVICES: HCPCS | Performed by: COUNSELOR

## 2017-08-22 NOTE — PROGRESS NOTES
.  IOP GROUP NOTE    DATA:    3 hour IOP group therapy session (changes)  Hour 1: Therapist continued facilitation of rapport building strategies between group members. Therapist asked that each patient check in with home life and recovery efforts and identify triggers, cravings, and  high risk situations that arise between group sessions.   Hour 2: Therapist continued psychoeducation and facilitation of group discussion on the topic of codependency. Today we focused on moving beyond codependency. Codependency is a learned behavior. Therapist explained how this is good news because it means patients can learn a new way, a different/healthy way of interacting with others, one that will help them feel good about the relationships in their lives.   Hour 3: Group members discussed setting boundaries with others and why that is important in recovery.  Therapist explained boundaries are the physical and emotional limits they need to set to protect themselves from being manipulated, or used by others.    (Self-reporting scales based on 0 - 10 with 10 being the worst)  Depression: 1/10 Anxiety: 2/10   Triggers: 0/10 Cravings: 0/10       ASSESSMENT:      Hygiene:   good  Cooperation:  Cooperative  Eye Contact:  Good  Affect:  normal affect  Speech:  Normal  Thought Progress:  Goal directed  Reliability:  fair  Insight:  fair  Judgement:  fair  Impulse Control:  fair    Family issues related to recovery:  family history of substance abuse, lack of education surrounding the disease model, perception of lack of support    12-Step attendance: yes  3 meeting minimum  per week    Sponsor: yes    Identification of environmental and personal barriers to recovery:  coping with negative emotions, overall understanding of disease of addiction, loss of independence, impulse control    Motivation for treatment:  healthy lifestyle, long-term, recovery, and improving overall relationships.    RESPONSE: Patient presented to group on time  and was cooperative throughout session. During check-in, patient reported feeling physically exhausted, emotionally pissed off, and spiritually connected.  Patient states she worked last night and only made $9.  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  She did not go to [facility] yesterday because she wanted to focus on keeping this job.  However today she has been actively start looking for a job that does not pose such high-risk for her recovery.  During group conversation about codependency, patient opened up about 13th stepping.  She shared about being in a previous relationship with an older man in recovery that she met at a meeting.  She latched on to him because he had money, sense of control and feels as though he latched onto her because she was easy to manipulate and control.  Patient also shared about a long history of codependency issues beginning as a teenager.      CLINICAL MANUVERING/INTERVENTIONS:  Patient was encouraged to bring family members for educational group on Wednesdays.  Patient was provided with encouragement and unconditional positive regard as patient opened up about struggles past and present.  Patient was encouraged to participate in 12-step group meetings and work the twelve steps with a sponsor.  Therapist encouraged patient to engage in problem solving abilities to identify and utilize healthy boundaries.    DASES SCORE:  63    PLAN:  Continue [facility] IOP Phase I.     [facility]    Program Assignments:  Personal Journal, attendance of 12-step meetings, drug screens

## 2017-08-23 ENCOUNTER — OFFICE VISIT (OUTPATIENT)
Dept: PSYCHIATRY | Facility: HOSPITAL | Age: 27
End: 2017-08-23

## 2017-08-23 DIAGNOSIS — F33.2 MDD (MAJOR DEPRESSIVE DISORDER), RECURRENT SEVERE, WITHOUT PSYCHOSIS (HCC): ICD-10-CM

## 2017-08-23 DIAGNOSIS — F15.20 METHAMPHETAMINE DEPENDENCE (HCC): Primary | ICD-10-CM

## 2017-08-23 DIAGNOSIS — F43.10 POST TRAUMATIC STRESS DISORDER (PTSD): ICD-10-CM

## 2017-08-23 DIAGNOSIS — F11.20 UNCOMPLICATED OPIOID DEPENDENCE (HCC): ICD-10-CM

## 2017-08-23 PROCEDURE — H0015 ALCOHOL AND/OR DRUG SERVICES: HCPCS | Performed by: PSYCHIATRY & NEUROLOGY

## 2017-08-23 NOTE — PROGRESS NOTES
.  IOP GROUP NOTE    DATA:    3 hour IOP group therapy session (changes)  Hour 1: Therapist continued facilitation of rapport building strategies between group members. Therapist asked that each patient check in with home life and recovery efforts and identify triggers, cravings, and  high risk situations that arise between group sessions.   Hour 2: Therapist facilitated psychoeducation and group discussion on thinking errors. We discussed closed thinking scenarios such as: not allowing or disregarding feedback from others, lies by omission, little self-discipline, quick to point out and give feedback on faults of others, not looking critically at self, and blaming others; and discussed how those errors in thinking are destructive in early recovery.  Hour 3: Volunteer lead 12-step meeting    (Self-reporting scales based on 0 - 10 with 10 being the worst)  Depression: 3/10 Anxiety: 4/10   Triggers: 5/10 Cravings: 2/10       ASSESSMENT:      Hygiene:   good  Cooperation:  Cooperative  Eye Contact:  Good  Affect:  normal affect  Speech:  Normal  Thought Progress:  Goal directed  Reliability:  fair  Insight:  fair  Judgement:  fair  Impulse Control:  fair    Family issues related to recovery:  family history of substance abuse, lack of education surrounding the disease model, perception of lack of support    12-Step attendance: yes  3 meeting minimum  per week    Sponsor: yes    Identification of environmental and personal barriers to recovery:  coping with negative emotions, overall understanding of disease of addiction, loss of independence, impulse control    Motivation for treatment:  healthy lifestyle, long-term, recovery, and improving overall relationships.    RESPONSE: Patient presented to group on time and was cooperative throughout session. During check-in, patient reported feeling overwhelmed with stress today. Her ex went to court today in [place] for a custody hearing where he may end up with permanent custody  "of their two children. She expressed frustration in herself for not having a car or way to get to [place]. She is worried about the outcome. She sent a letter to the  including her recovery efforts out of state and explained that she could not financially make it to the hearing today. She is worried about the outcome. Best case scenario will be that her ex maintains temporary custody, but she doubts the courts will prolong the process any longer. She made several disparaging comments about herself, such as \"I should have a car by now\", \"I shouldn't be such a terrible mom that screws up her kids\", etc... Group members and therapist pointed out her victimstance errors in thinking. She blamed society, conditions, and others for the court dee. She also believes it to be easier to think that way and easier to take all the blame. Her ex is partially responsible for their current legal issues.     CLINICAL MANUVERING/INTERVENTIONS:  Patient was encouraged to bring family members for educational group on Wednesdays.  Patient was provided with encouragement and unconditional positive regard as patient opened up about struggles past and present.  Patient was encouraged to participate in 12-step group meetings and work the twelve steps with a sponsor.  Therapist encouraged patient to engage in problem solving abilities to identify and utilize healthy boundaries.    DASES SCORE:  63    PLAN:  Continue [facility] IOP Phase I.     Aftercare:  [facility], IOP Phase II    Program Assignments:  Personal Journal, attendance of 12-step meetings, drug screens    Diagnosis: Methamphetamine dependence, opioid dependence, MDD, recurrent, severe, PTSD  "

## 2017-08-24 ENCOUNTER — LAB (OUTPATIENT)
Dept: LAB | Facility: HOSPITAL | Age: 27
End: 2017-08-24

## 2017-08-24 ENCOUNTER — OFFICE VISIT (OUTPATIENT)
Dept: PSYCHIATRY | Facility: HOSPITAL | Age: 27
End: 2017-08-24

## 2017-08-24 DIAGNOSIS — F11.20 UNCOMPLICATED OPIOID DEPENDENCE (HCC): ICD-10-CM

## 2017-08-24 DIAGNOSIS — F33.2 MDD (MAJOR DEPRESSIVE DISORDER), RECURRENT SEVERE, WITHOUT PSYCHOSIS (HCC): ICD-10-CM

## 2017-08-24 DIAGNOSIS — F15.20 METHAMPHETAMINE DEPENDENCE (HCC): Primary | ICD-10-CM

## 2017-08-24 DIAGNOSIS — F43.10 POST TRAUMATIC STRESS DISORDER (PTSD): ICD-10-CM

## 2017-08-24 DIAGNOSIS — F15.20 OTHER STIMULANT DEPENDENCE, UNCOMPLICATED (HCC): ICD-10-CM

## 2017-08-24 DIAGNOSIS — F32.2 MAJOR DEPRESSIVE DISORDER, SINGLE EPISODE, SEVERE WITHOUT PSYCHOTIC FEATURES (HCC): ICD-10-CM

## 2017-08-24 PROCEDURE — 80306 DRUG TEST PRSMV INSTRMNT: CPT | Performed by: PSYCHIATRY & NEUROLOGY

## 2017-08-24 PROCEDURE — H0015 ALCOHOL AND/OR DRUG SERVICES: HCPCS | Performed by: PSYCHIATRY & NEUROLOGY

## 2017-08-24 NOTE — PROGRESS NOTES
.  IOP GROUP NOTE    DATA:    3 hour IOP group therapy session (changes)  Hour 1: Therapist continued facilitation of rapport building strategies between group members. Therapist asked that each patient check in with home life and recovery efforts and identify triggers, cravings, and  high risk situations that arise between group sessions.   Hour 2 & Hour 3: Played the movie 28 Days while pausing periodically to discuss.    (Self-reporting scales based on 0 - 10 with 10 being the worst)  Depression: 1/10 Anxiety: 1/10   Triggers: 0/10 Cravings: 1/10       ASSESSMENT:      Hygiene:   good  Cooperation:  Cooperative  Eye Contact:  Good  Affect:  normal affect  Speech:  Normal  Thought Progress:  Goal directed  Reliability:  fair  Insight:  fair  Judgement:  fair  Impulse Control:  fair    Family issues related to recovery:  family history of substance abuse, lack of education surrounding the disease model, perception of lack of support    12-Step attendance: yes  3 meeting minimum  per week    Sponsor: yes    Identification of environmental and personal barriers to recovery:  coping with negative emotions, overall understanding of disease of addiction, loss of independence, impulse control    Motivation for treatment:  healthy lifestyle, long-term, recovery, and improving overall relationships.    RESPONSE: Patient presented to group on time and was cooperative throughout session. During check-in, patient reported feeling exhausted trying to mentally/emotionally juggle IOP, meetings, two jobs, etc. She reports having fun spending time with her mother yesterday. They are trying to rebuild their relationship now she and her mother are both sober. Patient talked about how rare it is for her and her mother to both be sober at the same time. She is getting to know her mother again. Patient shared thoughtful and encouraging feedback to other group members during the discussions. She shared about her hesitation to go to  residential treatment (which is the overarching topic of the movie) however she can see the benefits of starting at residential treatment level of care before IOP.    CLINICAL MANUVERING/INTERVENTIONS:  Patient was encouraged to bring family members for educational group on Wednesdays.  Patient was provided with encouragement and unconditional positive regard as patient opened up about struggles past and present.  Patient was encouraged to participate in 12-step group meetings and work the twelve steps with a sponsor.  Therapist encouraged patient to engage in problem solving abilities to identify and utilize healthy boundaries.    DASES SCORE:  63    PLAN:  Continue [facility] IOP Phase I.     Aftercare:  [facility] Phase II    Program Assignments:  Personal Journal, attendance of 12-step meetings, drug screens    VISIT DIAGNOSIS:     ICD-10-CM ICD-9-CM   1. Methamphetamine dependence F15.20 304.40   2. Uncomplicated opioid dependence F11.20 304.00   3. MDD (major depressive disorder), recurrent severe, without psychosis F33.2 296.33   4. Post traumatic stress disorder (PTSD) F43.10 309.81
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
## 2017-08-28 ENCOUNTER — OFFICE VISIT (OUTPATIENT)
Dept: PSYCHIATRY | Facility: HOSPITAL | Age: 27
End: 2017-08-28

## 2017-08-28 DIAGNOSIS — F43.10 POST TRAUMATIC STRESS DISORDER (PTSD): ICD-10-CM

## 2017-08-28 DIAGNOSIS — F15.20 METHAMPHETAMINE DEPENDENCE (HCC): Primary | ICD-10-CM

## 2017-08-28 DIAGNOSIS — F11.20 UNCOMPLICATED OPIOID DEPENDENCE (HCC): ICD-10-CM

## 2017-08-28 DIAGNOSIS — F33.2 MDD (MAJOR DEPRESSIVE DISORDER), RECURRENT SEVERE, WITHOUT PSYCHOSIS (HCC): ICD-10-CM

## 2017-08-28 PROCEDURE — H0015 ALCOHOL AND/OR DRUG SERVICES: HCPCS | Performed by: PSYCHIATRY & NEUROLOGY

## 2017-08-28 NOTE — PROGRESS NOTES
.  IOP GROUP NOTE    DATA:    3 hour IOP group therapy session (changes)  Hour 1: Therapist continued facilitation of rapport building strategies between group members. Therapist asked that each patient check in with home life and recovery efforts and identify triggers, cravings, and  high risk situations that arise between group sessions.   Hour 2: IOP Graduation  Hour 3: IOP Graduation     (Self-reporting scales based on 0 - 10 with 10 being the worst)  Depression: 1/10 Anxiety: 2/10   Triggers: 0/10 Cravings: 0/10       ASSESSMENT:      Hygiene:   good  Cooperation:  Cooperative  Eye Contact:  Good  Affect:  normal affect  Speech:  Normal  Thought Progress:  Goal directed  Reliability:  fair  Insight:  fair  Judgement:  fair  Impulse Control:  fair    Family issues related to recovery:  family history of substance abuse, lack of education surrounding the disease model, perception of lack of support    12-Step attendance: yes  3 meeting minimum  per week    Sponsor: yes    Identification of environmental and personal barriers to recovery:  coping with negative emotions, overall understanding of disease of addiction, loss of independence, impulse control    Motivation for treatment:  healthy lifestyle, long-term, recovery, and improving overall relationships.    RESPONSE: Patient presented to group on time and was cooperative throughout session. During check-in, patient reported Feeling great today.  Today is her completion/graduation day.  Over the weekend she hung out with supportive friends and recovery, laughing, just enjoying life.  Patient states she is worried and stressed about living with her mom.  She would like to save up money as quickly as possible with her new job as a  at [employer] and get an apartment.  Her mother is also in recovery.  Patient claims that her mother blames everything on her such as finances, her mother's gambling problem, food, etc. patient presented her personal  recovery plan to the group.  She discussed being aware of her danger signs that pushed her onto thin ice in recovery.  She identified fighting with her ex, working too much, not taking time for herself, hanging out with old friends who are in active addiction, and fighting with her mother.  Now that she can recognize these danger signs, she talked about what she will do differently than what she has done in the past to stay clean and sober.  Patient also talked about attitudes and actions that have changed but continued to need work.  She plans to attend 4-6 meetings a week and meet with her sponsor and continue working on the 12 steps.    CLINICAL MANUVERING/INTERVENTIONS:  Patient was encouraged to bring family members for educational group on Wednesdays.  Patient was provided with encouragement and unconditional positive regard as patient opened up about struggles past and present.  Patient was encouraged to participate in 12-step group meetings and work the twelve steps with a sponsor.  Therapist encouraged patient to engage in problem solving abilities to identify and utilize healthy boundaries.    DASES SCORE:  65    PLAN:  Completed Phase I today.    Aftercare:  [facility], IOP Phase II        VISIT DIAGNOSIS:     ICD-10-CM ICD-9-CM   1. Methamphetamine dependence F15.20 304.40   2. Uncomplicated opioid dependence F11.20 304.00   3. MDD (major depressive disorder), recurrent severe, without psychosis F33.2 296.33   4. Post traumatic stress disorder (PTSD) F43.10 309.81

## 2017-08-28 NOTE — PROGRESS NOTES
.  [facility]  [address], [address]  [place] [address]  [phone]    INTENSIVE OUTPATIENT PROGRAM  DISCHARGE SUMMARY        ATTENDING: Washington Benton MD  LICENSED THERAPIST: CARROLL Mccabe [facility]    DATE OF ADMISSION: 07.13.17  DATE OF DISCHARGE: 08.28.17        REASON FOR ADMISSION: Patient admitted to [facility] voluntarily for drug dependency.    SUMMARY OF CARE, TREATMENT, and SERVICES PROVIDED: Patient began IOP on 07.13.17 and attended 25 group therapy sessions.  Patient with no excused absences and one unexcused absence.  Patient exhibited an increased score on the Drug Avoidance Self-Efficacy Scale (DASES) from a beginning score of 59 to ending score of 65 upon discharge, which indicates increased confidence in ability to maintain abstinence.  Patient displayed negative results on urine drug screens.      Patient also completed the following treatment goals: Identification of people, places, and situations to avoid in order to prevent relapse, increased DASES score, attending group therapy sessions as scheduled, participating during group by giving and receiving feedback, and finally patient met the requirement to attend a minimum of 3 12-step group meetings per week.     Patient identified the following recovery priorities: resolve legal issues, return to school and find a career and improving relationships with immediate family and fiancé’.  Patient identified healthy coping strategies during group, such as talking with a sponsor and attending support group meetings to assist in managing difficult emotions without using drugs or alcohol.    DISCHARGE RECOMMENDATIONS and REFERRALS: Patient completed [facility] IOP Phase I on 08.28.17.  Patient will be admitted to [facility] IOP Phase II.  Patient will attend Phase II on the next scheduled group date as well as weekly individual psychotherapy with CARROLL Mccabe [facility]. Patient  was encouraged to continue 12-step meetings and daily communication with his sponsor.        PROGNOSIS: good, with continued care

## 2017-08-29 ENCOUNTER — APPOINTMENT (OUTPATIENT)
Dept: PSYCHIATRY | Facility: HOSPITAL | Age: 27
End: 2017-08-29

## 2017-08-30 ENCOUNTER — OFFICE VISIT (OUTPATIENT)
Dept: PSYCHIATRY | Facility: CLINIC | Age: 27
End: 2017-08-30

## 2017-08-30 ENCOUNTER — APPOINTMENT (OUTPATIENT)
Dept: PSYCHIATRY | Facility: HOSPITAL | Age: 27
End: 2017-08-30

## 2017-08-30 DIAGNOSIS — F33.2 MDD (MAJOR DEPRESSIVE DISORDER), RECURRENT SEVERE, WITHOUT PSYCHOSIS (HCC): ICD-10-CM

## 2017-08-30 DIAGNOSIS — F11.20 UNCOMPLICATED OPIOID DEPENDENCE (HCC): ICD-10-CM

## 2017-08-30 DIAGNOSIS — F43.10 POST TRAUMATIC STRESS DISORDER (PTSD): ICD-10-CM

## 2017-08-30 DIAGNOSIS — F15.20 METHAMPHETAMINE DEPENDENCE (HCC): Primary | ICD-10-CM

## 2017-08-30 PROCEDURE — 90853 GROUP PSYCHOTHERAPY: CPT | Performed by: COUNSELOR

## 2017-08-31 ENCOUNTER — APPOINTMENT (OUTPATIENT)
Dept: PSYCHIATRY | Facility: HOSPITAL | Age: 27
End: 2017-08-31

## 2017-09-04 ENCOUNTER — APPOINTMENT (OUTPATIENT)
Dept: PSYCHIATRY | Facility: HOSPITAL | Age: 27
End: 2017-09-04

## 2017-09-05 ENCOUNTER — APPOINTMENT (OUTPATIENT)
Dept: PSYCHIATRY | Facility: HOSPITAL | Age: 27
End: 2017-09-05

## 2017-09-06 ENCOUNTER — OFFICE VISIT (OUTPATIENT)
Dept: PSYCHIATRY | Facility: CLINIC | Age: 27
End: 2017-09-06

## 2017-09-06 ENCOUNTER — APPOINTMENT (OUTPATIENT)
Dept: PSYCHIATRY | Facility: HOSPITAL | Age: 27
End: 2017-09-06

## 2017-09-06 DIAGNOSIS — F11.20 UNCOMPLICATED OPIOID DEPENDENCE (HCC): ICD-10-CM

## 2017-09-06 DIAGNOSIS — F15.20 METHAMPHETAMINE DEPENDENCE (HCC): Primary | ICD-10-CM

## 2017-09-06 DIAGNOSIS — F43.10 POST TRAUMATIC STRESS DISORDER (PTSD): ICD-10-CM

## 2017-09-06 DIAGNOSIS — F33.2 MDD (MAJOR DEPRESSIVE DISORDER), RECURRENT SEVERE, WITHOUT PSYCHOSIS (HCC): ICD-10-CM

## 2017-09-06 PROCEDURE — 90853 GROUP PSYCHOTHERAPY: CPT | Performed by: COUNSELOR
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
## 2017-09-07 ENCOUNTER — APPOINTMENT (OUTPATIENT)
Dept: PSYCHIATRY | Facility: HOSPITAL | Age: 27
End: 2017-09-07

## 2017-09-11 ENCOUNTER — APPOINTMENT (OUTPATIENT)
Dept: PSYCHIATRY | Facility: HOSPITAL | Age: 27
End: 2017-09-11

## 2017-09-12 ENCOUNTER — APPOINTMENT (OUTPATIENT)
Dept: PSYCHIATRY | Facility: HOSPITAL | Age: 27
End: 2017-09-12

## 2017-09-13 ENCOUNTER — OFFICE VISIT (OUTPATIENT)
Dept: PSYCHIATRY | Facility: CLINIC | Age: 27
End: 2017-09-13

## 2017-09-13 ENCOUNTER — APPOINTMENT (OUTPATIENT)
Dept: PSYCHIATRY | Facility: HOSPITAL | Age: 27
End: 2017-09-13

## 2017-09-13 DIAGNOSIS — F43.10 POST TRAUMATIC STRESS DISORDER (PTSD): ICD-10-CM

## 2017-09-13 DIAGNOSIS — F11.20 UNCOMPLICATED OPIOID DEPENDENCE (HCC): ICD-10-CM

## 2017-09-13 DIAGNOSIS — F33.2 MDD (MAJOR DEPRESSIVE DISORDER), RECURRENT SEVERE, WITHOUT PSYCHOSIS (HCC): ICD-10-CM

## 2017-09-13 DIAGNOSIS — F15.20 METHAMPHETAMINE DEPENDENCE (HCC): Primary | ICD-10-CM

## 2017-09-13 PROCEDURE — 90853 GROUP PSYCHOTHERAPY: CPT | Performed by: COUNSELOR

## 2017-09-13 NOTE — PROGRESS NOTES
.Group #: [facility] Type: Phase II & III Time: 12:30 pm - 2:00 pm     Pt was seen for regularly scheduled group session to address opioid dependence. Patient is enrolled in CD IOP Phase II.  Patient presented to group on time and remained cooperative throughout. Patient was distant and at times argumentative toward other group members during discussion of life skills. Patient states she is having a bad week. She can see herself falling back into old behaviors, hanging around old people. She would not disclose specifics. She would like to discuss current issues in her individual sessions.   Patient reports high triggers and cravings. Today makes 64 days clean and sober.          Topic:  Relapse Triggers, Coping Skills,, Substance abuse recovery,   Affect: appropriate  Participation: active  Pt Response:  open/receptive  Assessment/Plan     VISIT DIAGNOSIS:     ICD-10-CM ICD-9-CM   1. Methamphetamine dependence F15.20 304.40   2. Uncomplicated opioid dependence F11.20 304.00   3. MDD (major depressive disorder), recurrent severe, without psychosis F33.2 296.33   4. Post traumatic stress disorder (PTSD) F43.10 309.81        Clinical Maneuvering/Intervention:  Therapist facilitated psychoeducation on One of the things that are frequently overlooked is the need to teach addicts how to live normal, healthy and productive lives. This certainly includes the cessation of substance abuse, but it also includes the recovering addict knowing how to handle everyday adult tasks and situations. It’s highly common for an addict to fall so far into substance abuse or addiction that he or she forgets how to do seemingly mundane tasks, such as:  Cooking and Cleaning   Balancing a Budget   Eating Healthy   Finding and Keeping a Job   Social Skills and Interaction    Without these essential, yet basic life skills, a recovering addict is almost certainly doomed to relapse. If he or she is unable to function in society, they will undoubtedly  turn back to substance abuse to hide from, avoid or cope with feelings of inadequacy and low self-esteem. Processed the above with group members and facilitated group discussion.   Plan:  Continue CD IOP Phase II & III group at [facility]. Continue individual psychotherapy sessions with CARROLL Mccabe [facility]. Patient will maintain attendance at a minimum of 3 12-step meetings per week and daily communication with her sponsor. Patient will remain abstinent from all alcohol, illicit drugs, and nonprescribed medications. Patient will present to the nearest emergency room should SI, HI, AVH occur. Continue CD IOP Phase II & III group at [facility]. Continue individual psychotherapy sessions with CARROLL Mccabe NCC. Patient will maintain attendance at a minimum of 3 12-step meetings per week and daily communication with her sponsor. Patient will remain abstinent from all alcohol, illicit drugs, and nonprescribed medications. Patient will present to the nearest emergency room should SI, HI, AVH occur.

## 2017-09-14 ENCOUNTER — APPOINTMENT (OUTPATIENT)
Dept: PSYCHIATRY | Facility: HOSPITAL | Age: 27
End: 2017-09-14

## 2017-09-18 ENCOUNTER — APPOINTMENT (OUTPATIENT)
Dept: PSYCHIATRY | Facility: HOSPITAL | Age: 27
End: 2017-09-18

## 2017-09-18 ENCOUNTER — OFFICE VISIT (OUTPATIENT)
Dept: PSYCHIATRY | Facility: CLINIC | Age: 27
End: 2017-09-18

## 2017-09-18 DIAGNOSIS — F43.10 POST TRAUMATIC STRESS DISORDER (PTSD): ICD-10-CM

## 2017-09-18 DIAGNOSIS — F11.20 UNCOMPLICATED OPIOID DEPENDENCE (HCC): ICD-10-CM

## 2017-09-18 DIAGNOSIS — F33.2 MDD (MAJOR DEPRESSIVE DISORDER), RECURRENT SEVERE, WITHOUT PSYCHOSIS (HCC): ICD-10-CM

## 2017-09-18 DIAGNOSIS — F15.20 METHAMPHETAMINE DEPENDENCE (HCC): Primary | ICD-10-CM

## 2017-09-18 PROCEDURE — 90834 PSYTX W PT 45 MINUTES: CPT | Performed by: COUNSELOR

## 2017-09-18 NOTE — PROGRESS NOTES
".Date of Service: September 18, 2017  Time In: 2:05pm  Time Out: 2:45 pm      PROGRESS NOTE  Data:  Zeenat Murrieta is a 27 y.o. female who [facility] scheduled [facility] session at [facility] for follow up.  Patient is currently enrolled in chemical dependency intensive outpatient program phase II including one group and one individual session per week.  Patient stated since finishing phase I \"everything has been a fog\". She reportedly feels \"disconnected spiritually and from reality\". She reports not doing well today or over the past week. She feels her depression worsening and lack of motivation to do more than sleep most days. She was fired from her full time job two days ago. She also lost a part time job last week. She has not talked to her kids more than once or twice in over a week, in which she became tearful about. She was talking to them at least once a day while in phase I. She reports having little to no motivation to go to meetings or find another sponsor. She was previously attending a meeting everyday and working with a sponsor on the 12 steps. Patient denies SI/HI/AVH.  She denies relapse. However, wishes to return to phase I as she does not feel she was in phase I long enough to strengthen her recovery.     **  It is to be noted that patient's mother has contacted the office on several occasions to leave a message of concern for her daughter \"using again\". We do not have a release, therefore I have not spoken with her mother about these concerns specifically. Another group member (and reportedly patient's best friend) also contacted us with concern that patient had gotten drunk and was talking to her former dope dealer.       Clinical Maneuvering/Intervention:  Assisted patient in processing above session content; acknowledged and normalized patient’s thoughts, feelings, and concerns. Processed patient's current struggles with " depression and failing to continue with as much motivation for recovery efforts in phase II. Validated her concerns that she was not ready to move to phase II and agreed to allow her to return to phase I.    Allowed patient to freely discuss issues without interruption or judgment. Provided safe, confidential environment to facilitate the development of positive therapeutic relationship and encourage open, honest communication. Assisted patient in identifying risk factors which would indicate the need for higher level of care including thoughts to harm self or others and/or self-harming behavior and encouraged patient to contact this office, call 911, or present to the nearest emergency room should any of these events occur. Discussed crisis intervention services and means to access.  Patient adamantly and convincingly denies current suicidal or homicidal ideation or perceptual disturbance.    Assessment     Diagnoses and all orders for this visit:    Methamphetamine dependence    Uncomplicated opioid dependence    MDD (major depressive disorder), recurrent severe, without psychosis    Post traumatic stress disorder (PTSD)             Mental Status Exam  Hygiene:  good  Dress:  casual  Attitude:  Cooperative  Motor Activity:  Appropriate  Speech:  Normal  Mood:  sad  Affect:  very depressed  Thought Processes:  Goal directed  Thought Content:  normal  Suicidal Thoughts:  denies  Homicidal Thoughts:  denies  Crisis Safety Plan: yes, to come to the emergency room.  Hallucinations:  denies    Patient's Support Network Includes:  mother    Progress toward goal: Not at goal    Functional Status: No impairment    Prognosis: poor, without continued care in IOP Phase I    SHORT-TERM GOALS: Patient will be compliant with clinic appointments.  Patient will be engaged in therapy, medication compliant with minimal side effects. Patient  will report decrease of symptoms and frequency.     LONG-TERM GOALS: Patient will have  minimal symptoms of  with continued medication management. Patient will be compliant with treatment and appointments.    Plan     Pt. To contact medicaid office to reinstate insurance coverage that has lapsed. Pt to return to [facility] Phase I on next scheduled group date.      Patient will adhere to medication regimen as prescribed and report any side effects. Patient will contact this office, call 911 or present to the nearest emergency room should suicidal or homicidal ideations occur. Patient will be provided with Cognitive Behavioral Therapy and Solution Focused Therapy to improve functioning, maintain stability, and avoid decompensation and the need for higher level of care.         No Follow-up on file.    VISIT DIAGNOSIS:     ICD-10-CM ICD-9-CM   1. Methamphetamine dependence F15.20 304.40   2. Uncomplicated opioid dependence F11.20 304.00   3. MDD (major depressive disorder), recurrent severe, without psychosis F33.2 296.33   4. Post traumatic stress disorder (PTSD) F43.10 309.81        Janet Wallis, [facility]

## 2017-09-19 ENCOUNTER — APPOINTMENT (OUTPATIENT)
Dept: PSYCHIATRY | Facility: HOSPITAL | Age: 27
End: 2017-09-19

## 2017-09-20 ENCOUNTER — APPOINTMENT (OUTPATIENT)
Dept: PSYCHIATRY | Facility: HOSPITAL | Age: 27
End: 2017-09-20

## 2017-09-20 ENCOUNTER — OFFICE VISIT (OUTPATIENT)
Dept: PSYCHIATRY | Facility: CLINIC | Age: 27
End: 2017-09-20

## 2017-09-20 DIAGNOSIS — F33.2 MDD (MAJOR DEPRESSIVE DISORDER), RECURRENT SEVERE, WITHOUT PSYCHOSIS (HCC): ICD-10-CM

## 2017-09-20 DIAGNOSIS — F11.20 UNCOMPLICATED OPIOID DEPENDENCE (HCC): ICD-10-CM

## 2017-09-20 DIAGNOSIS — F15.20 METHAMPHETAMINE DEPENDENCE (HCC): Primary | ICD-10-CM

## 2017-09-20 DIAGNOSIS — F43.10 POST TRAUMATIC STRESS DISORDER (PTSD): ICD-10-CM

## 2017-09-20 PROCEDURE — 90853 GROUP PSYCHOTHERAPY: CPT | Performed by: COUNSELOR

## 2017-09-20 NOTE — PROGRESS NOTES
".Group #: [facility] Type: Phase II & III Time: 12:30 pm - 2:00 pm     Pt was seen for regularly scheduled group session to address opioid dependence. Patient is enrolled in CD IOP Phase II.  Patient presented to group on time and remained cooperative throughout. Patient provided thoughtful and encouraging feedback to other group members during discussion of understanding denial and psychological defenses.      Patient reports mild triggers and cravings. Today makes 71 days clean and sober.     Patient was very tearful during group.  She reports not caring about how her life schooling.  She lost her job and doesn't care.  She hasn't been talking to her supportive friends in recovery and doesn't care about that either.  She feels like she is on the brink of relapse.  The only thing keeping her from using is the fact that she knows if she starts using heroin again that she will die.  She wants to stay around for her children.  She is really struggling to manage at this level of care and it was recommended that she go back to IOP phase I which is more structured and intense.  Group members were supportive of this decision and patient states she will \"think about it\".    Topic:  Relapse Triggers, Coping Skills,, Substance abuse recovery,   Affect: appropriate  Participation: active  Pt Response:  open/receptive  Assessment/Plan     Methamphetamine dependence, uncomplicated opioid dependence, MDD severe, without psychosis, PTSD      Clinical Maneuvering/Intervention:  Therapist facilitated psychoeducation on understanding denial and psychological defenses. The goal of today's group was to help patients understand denial, rationalization, and other defense mechanisms and how they can interfere with recovery from substance dependence.           *Processed the above with group members and facilitated group discussion.       Plan:  Continue CD IOP Phase II & III group at [facility]. Continue individual " psychotherapy sessions with CARROLL Mccabe NCC. Patient will maintain attendance at a minimum of 3 12-step meetings per week and daily communication with her sponsor. Patient will remain abstinent from all alcohol, illicit drugs, and nonprescribed medications. Patient will present to the nearest emergency room should SI, HI, AVH occur. Continue CD IOP Phase II & III group at [facility]. Continue individual psychotherapy sessions with CARROLL Mccabe NCC. Patient will maintain attendance at a minimum of 3 12-step meetings per week and daily communication with her sponsor. Patient will remain abstinent from all alcohol, illicit drugs, and nonprescribed medications. Patient will present to the nearest emergency room should SI, HI, AVH occur.

## 2017-09-21 ENCOUNTER — APPOINTMENT (OUTPATIENT)
Dept: PSYCHIATRY | Facility: HOSPITAL | Age: 27
End: 2017-09-21

## 2017-09-25 ENCOUNTER — APPOINTMENT (OUTPATIENT)
Dept: PSYCHIATRY | Facility: HOSPITAL | Age: 27
End: 2017-09-25

## 2017-09-26 ENCOUNTER — APPOINTMENT (OUTPATIENT)
Dept: PSYCHIATRY | Facility: HOSPITAL | Age: 27
End: 2017-09-26

## 2017-09-27 ENCOUNTER — OFFICE VISIT (OUTPATIENT)
Dept: PSYCHIATRY | Facility: CLINIC | Age: 27
End: 2017-09-27

## 2017-09-27 ENCOUNTER — APPOINTMENT (OUTPATIENT)
Dept: PSYCHIATRY | Facility: HOSPITAL | Age: 27
End: 2017-09-27

## 2017-09-27 DIAGNOSIS — F43.10 POST TRAUMATIC STRESS DISORDER (PTSD): ICD-10-CM

## 2017-09-27 DIAGNOSIS — F11.20 UNCOMPLICATED OPIOID DEPENDENCE (HCC): ICD-10-CM

## 2017-09-27 DIAGNOSIS — F33.2 MDD (MAJOR DEPRESSIVE DISORDER), RECURRENT SEVERE, WITHOUT PSYCHOSIS (HCC): ICD-10-CM

## 2017-09-27 DIAGNOSIS — F15.20 METHAMPHETAMINE DEPENDENCE (HCC): Primary | ICD-10-CM

## 2017-09-27 PROCEDURE — 90853 GROUP PSYCHOTHERAPY: CPT | Performed by: COUNSELOR

## 2017-09-27 NOTE — PROGRESS NOTES
"Group #: [facility] Type: Phase II & III Time: 12:30 pm - 2:00 pm      Pt was seen for regularly scheduled group session to address opioid dependence. Patient is enrolled in CD [facility] Phase II.  Patient presented to group on time and remained cooperative throughout. Patient provided thoughtful and encouraging feedback to other group members during discussion of criminal behavior and alcohol-other drugs relapse cycle.     Patient reports high triggers and cravings. Today makes 78 days clean and sober.  She is really struggling.  I encouraged her to go back to phase I and but instead we made due to individual sessions a week.  Over the weekend she \"came really close to relapsing and I really don't know what stopped me\".  She had her bags packed and called her former drug dealer in [place] to come and pick her up.  She came off of her medication due to an insurance issue and hasn't had it for 8 days.  All she wants to do is sleep and eat.  She is nearly out of money.  She has no current income coming and she lost her job about 2 weeks ago.  Patient reports no motivation to get up and find a job which is \"very unlike me\".        Topic:  Relapse Triggers, Coping Skills,, Substance abuse recovery,   Affect: appropriate  Participation: active  Pt Response:  open/receptive  Assessment/Plan      Methamphetamine dependence, uncomplicated opioid dependence, MDD severe, without psychosis, PTSD        Clinical Maneuvering/Intervention:  Therapist facilitated psychoeducation on Criminal behavior and alcohol-other drug related relapse area and relapse is really the final result of a change of events that starts days, weeks, or even months before it happens.  Provided participants with the figure that shows how this works.  We focused more closely on what leads up to a relapse. Addicts sometimes the relapse as impulsive--a moment of weakness when they let her guard down.  Therapist explained how this is not true.  Stress, coping strategies " (or lack of them) and decision-making skills all play a role as do seemingly unimportant decisions             *Processed the above with group members and facilitated group discussion.       Plan:  Continue CD IOP Phase II & III group at [facility]. Continue individual psychotherapy sessions with CARROLL Mccabe NCC. Patient will maintain attendance at a minimum of 3 12-step meetings per week and daily communication with her sponsor. Patient will remain abstinent from all alcohol, illicit drugs, and nonprescribed medications. Patient will present to the nearest emergency room should SI, HI, AVH occur. Continue CD IOP Phase II & III group at [facility]. Continue individual psychotherapy sessions with CARROLL Mccabe NCC. Patient will maintain attendance at a minimum of 3 12-step meetings per week and daily communication with her sponsor. Patient will remain abstinent from all alcohol, illicit drugs, and nonprescribed medications. Patient will present to the nearest emergency room should SI, HI, AVH occur.

## 2017-09-28 ENCOUNTER — OFFICE VISIT (OUTPATIENT)
Dept: PSYCHIATRY | Facility: CLINIC | Age: 27
End: 2017-09-28

## 2017-09-28 ENCOUNTER — APPOINTMENT (OUTPATIENT)
Dept: PSYCHIATRY | Facility: HOSPITAL | Age: 27
End: 2017-09-28

## 2017-09-28 DIAGNOSIS — F60.3 BORDERLINE PERSONALITY DISORDER (HCC): ICD-10-CM

## 2017-09-28 DIAGNOSIS — F33.2 MDD (MAJOR DEPRESSIVE DISORDER), RECURRENT SEVERE, WITHOUT PSYCHOSIS (HCC): ICD-10-CM

## 2017-09-28 DIAGNOSIS — F11.20 UNCOMPLICATED OPIOID DEPENDENCE (HCC): ICD-10-CM

## 2017-09-28 DIAGNOSIS — F15.20 METHAMPHETAMINE DEPENDENCE (HCC): Primary | ICD-10-CM

## 2017-09-28 PROCEDURE — 90834 PSYTX W PT 45 MINUTES: CPT | Performed by: COUNSELOR

## 2017-09-28 NOTE — PROGRESS NOTES
".Date of Service: September 28, 2017  Time In: 2 PM  Time Out: 2:45 PM      PROGRESS NOTE  Data:  Zeenat Murrieta is a 27 y.o. female who met 1:1 [facility] for regularly scheduled psychotherapy session at [facility] for follow up.  Patient presented reporting no change since last visit.  She still feels down, low energy, trouble \"picking myself back up\".  The patient described a history of extreme emotional reactivity when minor stresses occur in her life.  Her emotional reactivity is usually quite short-lived, as she returns to calm state after demonstrating strong feelings of anger, anxiety, or depression.  However, her insurance lapsed therefore she has been out of her Zoloft.  Without the Zoloft she has had a hard time bouncing back.  She reports strong triggers and cravings to use alcohol or other drugs.  She denies using stating she is \"too broke\" and \"too depressed to go get anything\".  If she had energy and/or money patient said for certain she would have used by now.          Clinical Maneuvering/Intervention:  The patient's experience of distress and subsequent difficulties were validated as understandable, given her particular circumstances, thoughts, and feelings.  It was reflected to the patient that a lot of people would experience the same distress and difficulties without their medication, given the same circumstances, thoughts, and feelings.  She was noted to except the validation about her level of distress.  Began to assess the level of insight towards her presenting problems.  Provided patient with resources within the hospital that can help her get her insurance issues back on track.  This will hopefully result in her getting her medication very soon.    Allowed patient to freely discuss issues without interruption or judgment. Provided safe, confidential environment to facilitate the development of positive therapeutic relationship and encourage " open, honest communication. Assisted patient in identifying risk factors which would indicate the need for higher level of care including thoughts to harm self or others and/or self-harming behavior and encouraged patient to contact this office, call 911, or present to the nearest emergency room should any of these events occur. Discussed crisis intervention services and means to access.  Patient adamantly and convincingly denies current suicidal or homicidal ideation or perceptual disturbance.    Assessment     There are no diagnoses linked to this encounter.           Mental Status Exam  Hygiene:  good  Dress:  casual  Attitude:  Cooperative  Motor Activity:  Restless  Speech:  Normal  Mood:  anxious  Affect:  depressed and anxious  Thought Processes:  Goal directed  Thought Content:  normal  Suicidal Thoughts:  denies  Homicidal Thoughts:  denies  Crisis Safety Plan: yes, to come to the emergency room.  Hallucinations:  denies    Patient's Support Network Includes:  mother and extended family    Progress toward goal: Not at goal    Functional Status: No impairment    Prognosis: Fair with Ongoing Treatment     SHORT-TERM GOALS: Patient will be compliant with clinic appointments.  Patient will be engaged in therapy, medication compliant with minimal side effects. Patient  will report decrease of symptoms and frequency.     LONG-TERM GOALS: Patient will have minimal symptoms of  with continued medication management. Patient will be compliant with treatment and appointments.    Plan         Patient will adhere to medication regimen as prescribed and report any side effects. Patient will contact this office, call 911 or present to the nearest emergency room should suicidal or homicidal ideations occur. Patient will be provided with Cognitive Behavioral Therapy and Solution Focused Therapy to improve functioning, maintain stability, and avoid decompensation and the need for higher level of care.         No Follow-up on  file.    VISIT DIAGNOSIS: No diagnosis found.     Janet Wallis, [facility]

## 2017-10-05 ENCOUNTER — OFFICE VISIT (OUTPATIENT)
Dept: PSYCHIATRY | Facility: CLINIC | Age: 27
End: 2017-10-05

## 2017-10-05 DIAGNOSIS — F11.20 UNCOMPLICATED OPIOID DEPENDENCE (HCC): ICD-10-CM

## 2017-10-05 DIAGNOSIS — F43.10 POST TRAUMATIC STRESS DISORDER (PTSD): ICD-10-CM

## 2017-10-05 DIAGNOSIS — F60.3 BORDERLINE PERSONALITY DISORDER (HCC): Primary | ICD-10-CM

## 2017-10-05 DIAGNOSIS — F15.20 METHAMPHETAMINE DEPENDENCE (HCC): ICD-10-CM

## 2017-10-05 PROCEDURE — 90834 PSYTX W PT 45 MINUTES: CPT | Performed by: COUNSELOR

## 2017-10-09 ENCOUNTER — OFFICE VISIT (OUTPATIENT)
Dept: PSYCHIATRY | Facility: CLINIC | Age: 27
End: 2017-10-09

## 2017-10-09 DIAGNOSIS — F33.2 MDD (MAJOR DEPRESSIVE DISORDER), RECURRENT SEVERE, WITHOUT PSYCHOSIS (HCC): ICD-10-CM

## 2017-10-09 DIAGNOSIS — F15.20 METHAMPHETAMINE DEPENDENCE (HCC): ICD-10-CM

## 2017-10-09 DIAGNOSIS — F11.20 UNCOMPLICATED OPIOID DEPENDENCE (HCC): ICD-10-CM

## 2017-10-09 DIAGNOSIS — F43.10 POST TRAUMATIC STRESS DISORDER (PTSD): ICD-10-CM

## 2017-10-09 DIAGNOSIS — F60.3 BORDERLINE PERSONALITY DISORDER (HCC): Primary | ICD-10-CM

## 2017-10-09 PROCEDURE — 90834 PSYTX W PT 45 MINUTES: CPT | Performed by: COUNSELOR
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
## 2017-10-10 NOTE — PROGRESS NOTES
".Date of Service: October 10, 2017  Time In: 3:45pm  Time Out: 4:30pm      PROGRESS NOTE  Data:  Zeenat Murrieta is a 27 y.o. female who [facility] scheduled [facility] session at [facility].  Patient states she is doing a little better today.  She woke up in a good mood today.  Over the past week she has had some self harming thoughts but managed to not act on them.  She has a history of cutting and skin picking.  Patient discussed self mutilating behavior on several occasions during today's session and how her goal is to not fall back into those patterns.  She denies suicidal ideation over the past week. She denies HI over the past week. She denies using alcohol and/or drugs. Patient did share about \"extreme and intense cravings\" to smoke crack and get drunk.  She reports struggling to find a job, more specifically to get the energy needed to job hunt.  To me her basic needs, she and her mother have been going to an [employer] where she can long.  Patient said she'll start with $20 and sometimes leave with over 100.  She knows that this is not good and it can be a secondary addiction for her but right now all she has interest in doing.         Clinical Maneuvering/Intervention:  Assisted patient in processing above session content; acknowledged and normalized patient’s thoughts, feelings, and concerns.  Therapist encouraged patient to attend a minimum of 3 12-step meetings per week and to find a sponsor to communicate with daily and began working the steps.  The patient was encouraged to facilitate her personal growth by choosing experiences that strengthen self-awareness, personal values, and appreciation for life. We processed possible experiences that would make a more positive impact on her life instead of gambling.    Allowed patient to freely discuss issues without interruption or judgment. Provided safe, confidential environment to " facilitate the development of positive therapeutic relationship and encourage open, honest communication. Assisted patient in identifying risk factors which would indicate the need for higher level of care including thoughts to harm self or others and/or self-harming behavior and encouraged patient to contact this office, call 911, or present to the nearest emergency room should any of these events occur. Discussed crisis intervention services and means to access.  Patient adamantly and convincingly denies current suicidal or homicidal ideation or perceptual disturbance.    Assessment     Diagnoses and all orders for this visit:    Borderline personality disorder    Methamphetamine dependence    Uncomplicated opioid dependence    Post traumatic stress disorder (PTSD)    MDD (major depressive disorder), recurrent severe, without psychosis             Mental Status Exam  Hygiene:  good  Dress:  casual  Attitude:  Cooperative  Motor Activity:  Restless  Speech:  Normal  Mood:  anxious  Affect:  anxious  Thought Processes:  Goal directed  Thought Content:  normal  Suicidal Thoughts:  denies  Homicidal Thoughts:  denies  Crisis Safety Plan: yes, to come to the emergency room.  Hallucinations:  denies    Patient's Support Network Includes:  mother    Progress toward goal: making progress    Functional Status: No impairment    Prognosis: Fair with Ongoing Treatment     SHORT-TERM GOALS: Patient will be compliant with clinic appointments.  Patient will be engaged in therapy, medication compliant with minimal side effects. Patient  will report decrease of symptoms and frequency.     LONG-TERM GOALS: Patient will have minimal symptoms of  with continued medication management. Patient will be compliant with treatment and appointments.    Plan         Patient will adhere to medication regimen as prescribed and report any side effects. Patient will contact this office, call 911 or present to the nearest emergency room should  suicidal or homicidal ideations occur. Patient will be provided with Cognitive Behavioral Therapy and Solution Focused Therapy to improve functioning, maintain stability, and avoid decompensation and the need for higher level of care.         No Follow-up on file.    VISIT DIAGNOSIS:     ICD-10-CM ICD-9-CM   1. Borderline personality disorder F60.3 301.83   2. Methamphetamine dependence F15.20 304.40   3. Uncomplicated opioid dependence F11.20 304.00   4. Post traumatic stress disorder (PTSD) F43.10 309.81   5. MDD (major depressive disorder), recurrent severe, without psychosis F33.2 296.33        Janet Wallis, [facility]

## 2017-10-12 ENCOUNTER — OFFICE VISIT (OUTPATIENT)
Dept: PSYCHIATRY | Facility: CLINIC | Age: 27
End: 2017-10-12

## 2017-10-12 DIAGNOSIS — F15.20 METHAMPHETAMINE DEPENDENCE (HCC): ICD-10-CM

## 2017-10-12 DIAGNOSIS — F11.20 UNCOMPLICATED OPIOID DEPENDENCE (HCC): ICD-10-CM

## 2017-10-12 DIAGNOSIS — F60.3 BORDERLINE PERSONALITY DISORDER (HCC): Primary | ICD-10-CM

## 2017-10-12 DIAGNOSIS — F43.10 POST TRAUMATIC STRESS DISORDER (PTSD): ICD-10-CM

## 2017-10-12 PROCEDURE — 90834 PSYTX W PT 45 MINUTES: CPT | Performed by: COUNSELOR

## 2017-10-16 ENCOUNTER — OFFICE VISIT (OUTPATIENT)
Dept: PSYCHIATRY | Facility: CLINIC | Age: 27
End: 2017-10-16

## 2017-10-16 DIAGNOSIS — F15.20 METHAMPHETAMINE DEPENDENCE (HCC): ICD-10-CM

## 2017-10-16 DIAGNOSIS — F11.20 UNCOMPLICATED OPIOID DEPENDENCE (HCC): ICD-10-CM

## 2017-10-16 DIAGNOSIS — F60.3 BORDERLINE PERSONALITY DISORDER (HCC): Primary | ICD-10-CM

## 2017-10-16 DIAGNOSIS — F43.10 POST TRAUMATIC STRESS DISORDER (PTSD): ICD-10-CM

## 2017-10-16 PROCEDURE — 90834 PSYTX W PT 45 MINUTES: CPT | Performed by: COUNSELOR
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
## 2017-10-17 NOTE — PROGRESS NOTES
"Date of Service: October 16, 2017  Time In: 3:45 pm  Time Out: 4:30pm      PROGRESS NOTE  Data:  Zeenat Murrieta is a 27 y.o. female who [facility] for regularly scheduled psychotherapy session at [facility].  Patient shared struggling with overwhelming emotions.  She has felt sad and angry after talking with her ex. She planned to move back to [place] soon however he is not willing to let her live with him and their two kids.  She reports high cravings and she fights with her ex.  She denied using alcohol or other drugs however states she thought about it \"a lot.  She forced herself to go to a meeting yesterday and call her sponsor.        Clinical Maneuvering/Intervention:  Assisted patient in processing above session content; acknowledged and normalized patient’s thoughts, feelings, and concerns.  Today we utilized DBT.  Therapist explained people struggling with overwhelming emotions often deal with her pain and very unhealthy, very unsuccessful ways because they do not know what else to do.  Explained that this is understandable.  When a person is in emotional pain, it is hard to be rational and to think of a good solution.  Nevertheless, many of the coping strategies patient has used in the past when dealing with overwhelming emotions and only served to make her problems worse.  We discussed common coping strategies she is used in the past to deal with stressful situations.  Most all of her strategies in the past have led to even deeper emotional pain, because the strategies for temporary relief will only cause her more suffering in the future i.e. using alcohol and drugs to numb herself, taking her painful feelings out on others, engaging in dangerous behaviors like cutting, and spending a great deal of time thinking about her past mistakes and problems.    Allowed patient to freely discuss issues without interruption or judgment. Provided safe, confidential " environment to facilitate the development of positive therapeutic relationship and encourage open, honest communication. Assisted patient in identifying risk factors which would indicate the need for higher level of care including thoughts to harm self or others and/or self-harming behavior and encouraged patient to contact this office, call 911, or present to the nearest emergency room should any of these events occur. Discussed crisis intervention services and means to access.  Patient adamantly and convincingly denies current suicidal or homicidal ideation or perceptual disturbance.    Assessment     Diagnoses and all orders for this visit:    Borderline personality disorder    Methamphetamine dependence    Uncomplicated opioid dependence    Post traumatic stress disorder (PTSD)             Mental Status Exam  Hygiene:  good  Dress:  casual  Attitude:  Cooperative  Motor Activity:  Restless  Speech:  Normal  Mood:  anxious  Affect:  very anxious  Thought Processes:  Goal directed  Thought Content:  normal  Suicidal Thoughts:  denies  Homicidal Thoughts:  denies  Crisis Safety Plan: yes, to come to the emergency room.  Hallucinations:  denies    Patient's Support Network Includes:  mother and extended family    Progress toward goal: Not at goal    Functional Status: No impairment    Prognosis: Good with Ongoing Treatment     SHORT-TERM GOALS: Patient will be compliant with clinic appointments.  Patient will be engaged in therapy, medication compliant with minimal side effects. Patient  will report decrease of symptoms and frequency.     LONG-TERM GOALS: Patient will have minimal symptoms of  with continued medication management. Patient will be compliant with treatment and appointments.    Plan         Patient will adhere to medication regimen as prescribed and report any side effects. Patient will contact this office, call 911 or present to the nearest emergency room should suicidal or homicidal ideations occur.  Patient will be provided with Cognitive Behavioral Therapy and Solution Focused Therapy to improve functioning, maintain stability, and avoid decompensation and the need for higher level of care.          No Follow-up on file.    VISIT DIAGNOSIS:     ICD-10-CM ICD-9-CM   1. Borderline personality disorder F60.3 301.83   2. Methamphetamine dependence F15.20 304.40   3. Uncomplicated opioid dependence F11.20 304.00   4. Post traumatic stress disorder (PTSD) F43.10 309.81        Janet Wallis, [facility]

## 2017-10-31 ENCOUNTER — OFFICE VISIT (OUTPATIENT)
Dept: PSYCHIATRY | Facility: CLINIC | Age: 27
End: 2017-10-31

## 2017-10-31 DIAGNOSIS — F15.20 METHAMPHETAMINE DEPENDENCE (HCC): ICD-10-CM

## 2017-10-31 DIAGNOSIS — F11.20 UNCOMPLICATED OPIOID DEPENDENCE (HCC): ICD-10-CM

## 2017-10-31 DIAGNOSIS — F60.3 BORDERLINE PERSONALITY DISORDER (HCC): Primary | ICD-10-CM

## 2017-10-31 DIAGNOSIS — F33.2 MDD (MAJOR DEPRESSIVE DISORDER), RECURRENT SEVERE, WITHOUT PSYCHOSIS (HCC): ICD-10-CM

## 2017-10-31 DIAGNOSIS — F43.10 POST TRAUMATIC STRESS DISORDER (PTSD): ICD-10-CM

## 2017-10-31 PROCEDURE — 90834 PSYTX W PT 45 MINUTES: CPT | Performed by: COUNSELOR

## 2017-11-01 NOTE — PROGRESS NOTES
".Date of Service: 10.31.17  Time In: 4:45PM  Time Out: 5:30PM      PROGRESS NOTE  Data:  Zeenat Murrieta is a 27 y.o. female who [facility] for regularly scheduled [facility] session at [facility]. Patient began session by apologizing for missing her last session without calling to cancel.  She explained feeling overwhelmed, depressed, and unable to even get out of bed for nearly a week.  Patient states it took most of her energy to get up and take a shower before the session today. Her depression symptoms spiraled when her ex agreed  to come from [place] with their two kids to visit with her this coming Saturday. She explained how confusing it felt as she should be happy they are visiting.  Patient described feelings of pervasive, irrational guilt due to the way she treated her children during active addiction.  She reports high triggers and cravings to drink and use drugs but she managed to avoid using, largely due to her inability to get out of bed most days. Regardless of why she avoiding using, she reports feeling grateful as her typical pattern prior to therapy was to self-sabotage in situations like this. She denies SI/HI/AVH.       Clinical Maneuvering/Intervention:  The patient was encouraged to share her feelings of depression in order to clarify them again insight into their causes.  Empathy was expressed in regard to patient's sense of guilt and depression.  The patient was able to identify factors that contributed to her current symptoms.  The patient was engaged in behavioral activation by scheduling activities for the rest of the week until her children come and have a high likelihood for pleasure and mastery.  She was provided with resources \"homework planner\" to use for recording her completed tasks.     Allowed patient to freely discuss issues without interruption or judgment. Provided safe, confidential environment to facilitate the development " of positive therapeutic relationship and encourage open, honest communication. Assisted patient in identifying risk factors which would indicate the need for higher level of care including thoughts to harm self or others and/or self-harming behavior and encouraged patient to contact this office, call 911, or present to the nearest emergency room should any of these events occur. Discussed crisis intervention services and means to access.  Patient adamantly and convincingly denies current suicidal or homicidal ideation or perceptual disturbance.    Assessment          Mental Status Exam  Hygiene:  good  Dress:  disheveled  Attitude:  Guarded  Motor Activity:  Appropriate  Speech:  Normal  Mood:  sad  Affect:  depressed  Thought Processes:  Goal directed  Thought Content:  normal  Suicidal Thoughts:  denies  Homicidal Thoughts:  denies  Crisis Safety Plan: yes, to come to the emergency room.  Hallucinations:  denies    Patient's Support Network Includes:  Family    Functional Status: No impairment    Prognosis: Fair with Ongoing Treatment     SHORT-TERM GOALS: Patient will be compliant with clinic appointments.  Patient will be engaged in therapy, medication compliant with minimal side effects. Patient  will report decrease of symptoms and frequency.     LONG-TERM GOALS: Patient will have minimal symptoms of  with continued medication management. Patient will be compliant with treatment and appointments.    Plan     Patient will adhere to medication regimen as prescribed and report any side effects. Patient will contact this office, call 911 or present to the nearest emergency room should suicidal or homicidal ideations occur. Patient will be provided with Cognitive Behavioral Therapy and Solution Focused Therapy to improve functioning, maintain stability, and avoid decompensation and the need for higher level of care.            VISIT DIAGNOSIS:     ICD-10-CM ICD-9-CM   1. Borderline personality disorder F60.3 301.83    2. Methamphetamine dependence F15.20 304.40   3. Uncomplicated opioid dependence F11.20 304.00   4. Post traumatic stress disorder (PTSD) F43.10 309.81   5. MDD (major depressive disorder), recurrent severe, without psychosis F33.2 296.33        Janet Wallis, [facility]

## 2017-11-06 ENCOUNTER — OFFICE VISIT (OUTPATIENT)
Dept: PSYCHIATRY | Facility: CLINIC | Age: 27
End: 2017-11-06

## 2017-11-06 DIAGNOSIS — F15.20 METHAMPHETAMINE DEPENDENCE (HCC): ICD-10-CM

## 2017-11-06 DIAGNOSIS — F11.20 UNCOMPLICATED OPIOID DEPENDENCE (HCC): ICD-10-CM

## 2017-11-06 DIAGNOSIS — F60.3 BORDERLINE PERSONALITY DISORDER (HCC): Primary | ICD-10-CM

## 2017-11-06 DIAGNOSIS — F33.2 MDD (MAJOR DEPRESSIVE DISORDER), RECURRENT SEVERE, WITHOUT PSYCHOSIS (HCC): ICD-10-CM

## 2017-11-06 PROCEDURE — 90834 PSYTX W PT 45 MINUTES: CPT | Performed by: COUNSELOR
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
## 2017-11-09 ENCOUNTER — OFFICE VISIT (OUTPATIENT)
Dept: PSYCHIATRY | Facility: CLINIC | Age: 27
End: 2017-11-09

## 2017-11-09 DIAGNOSIS — F11.20 UNCOMPLICATED OPIOID DEPENDENCE (HCC): ICD-10-CM

## 2017-11-09 DIAGNOSIS — F43.10 POST TRAUMATIC STRESS DISORDER (PTSD): ICD-10-CM

## 2017-11-09 DIAGNOSIS — F15.20 METHAMPHETAMINE DEPENDENCE (HCC): ICD-10-CM

## 2017-11-09 DIAGNOSIS — F60.3 BORDERLINE PERSONALITY DISORDER (HCC): Primary | ICD-10-CM

## 2017-11-09 PROCEDURE — 90834 PSYTX W PT 45 MINUTES: CPT | Performed by: COUNSELOR

## 2017-11-09 NOTE — PROGRESS NOTES
".Date of Service: 11.09.17  Time In: 3:00pm  Time Out: 3:45pm      PROGRESS NOTE  Data:  Zeenat Murrieta is a 27 y.o. female who [facility] for [facility] scheduled [facility] session at [facility]. Patient states she is doing a little better today. She reports ongoing struggling finding the motivation to search for a job. She is now $800 behind on child support. 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 She reports stress-related paranoid ideation that her coworkers anywhere else would be \"out to cause drama\" because \"it always happens at every job I have\". Patient reports high triggers to drink this week. Denies using alcohol or other drugs. Patient states she has been managing her triggers and cravings to drink by going to meetings and talking to her best friend and her mother.  Denies SI/HI/AVH      Clinical Maneuvering/Intervention:  The patients affect was assessed, including emotional overreaction and painful emptiness in regard for targets for therapy. Patient has cooperated with referral to a physician and has an appt Dec 5. Validation was consistently used today to help her manage, reduce, and stabilize paranoid/maladaptive thoughts toward job searching. The patient was assisted in implementing positive, realistic self-talk that will enhance self-confidence and increase adaptive action. Provided resources to local temp agencies and career centers.     Allowed patient to freely discuss issues without interruption or judgment. Provided safe, confidential environment to facilitate the development of positive therapeutic relationship and encourage open, honest communication. Assisted patient in identifying risk factors which would indicate the need for higher level of care including thoughts to harm self or others and/or self-harming behavior and encouraged patient to contact " this office, call 911, or present to the nearest emergency room should any of these events occur. Discussed crisis intervention services and means to access.  Patient adamantly and convincingly denies current suicidal or homicidal ideation or perceptual disturbance.    Assessment          Mental Status Exam  Hygiene:  good  Dress:  casual  Attitude:  Cooperative  Motor Activity:  Restless  Speech:  Normal  Mood:  anxious  Affect:  calm and pleasant  Thought Processes:  Goal directed  Thought Content:  normal  Suicidal Thoughts:  denies  Homicidal Thoughts:  denies  Crisis Safety Plan: yes, to come to the emergency room.  Hallucinations:  denies    Patient's Support Network Includes:  Family    Functional Status: No impairment    Prognosis: Good with Ongoing Treatment     SHORT-TERM GOALS: Patient will be compliant with clinic appointments.  Patient will be engaged in therapy, medication compliant with minimal side effects. Patient  will report decrease of symptoms and frequency.     LONG-TERM GOALS: Patient will have minimal symptoms of  with continued medication management. Patient will be compliant with treatment and appointments.    Plan     Patient will adhere to medication regimen as prescribed and report any side effects. Patient will contact this office, call 911 or present to the nearest emergency room should suicidal or homicidal ideations occur. Patient will be provided with Cognitive Behavioral Therapy and Solution Focused Therapy to improve functioning, maintain stability, and avoid decompensation and the need for higher level of care.            VISIT DIAGNOSIS:     ICD-10-CM ICD-9-CM   1. Borderline personality disorder F60.3 301.83   2. Methamphetamine dependence F15.20 304.40   3. Uncomplicated opioid dependence F11.20 304.00   4. Post traumatic stress disorder (PTSD) F43.10 309.81        Janet Wallis, [facility]

## 2017-11-20 ENCOUNTER — OFFICE VISIT (OUTPATIENT)
Dept: PSYCHIATRY | Facility: CLINIC | Age: 27
End: 2017-11-20

## 2017-11-20 DIAGNOSIS — F15.20 METHAMPHETAMINE DEPENDENCE (HCC): ICD-10-CM

## 2017-11-20 DIAGNOSIS — F60.3 BORDERLINE PERSONALITY DISORDER (HCC): Primary | ICD-10-CM

## 2017-11-20 DIAGNOSIS — F43.10 POST TRAUMATIC STRESS DISORDER (PTSD): ICD-10-CM

## 2017-11-20 DIAGNOSIS — F11.20 UNCOMPLICATED OPIOID DEPENDENCE (HCC): ICD-10-CM

## 2017-11-20 DIAGNOSIS — F33.2 MDD (MAJOR DEPRESSIVE DISORDER), RECURRENT SEVERE, WITHOUT PSYCHOSIS (HCC): ICD-10-CM

## 2017-11-20 PROCEDURE — 90834 PSYTX W PT 45 MINUTES: CPT | Performed by: COUNSELOR

## 2017-11-20 NOTE — PROGRESS NOTES
".Date of Service: 11.20.17  Time In: 3PM  Time Out: 3:45PM      PROGRESS NOTE  Data:  Zeeant Murrieta is a 27 y.o. female who [facility] scheduled [facility] session at [facility].  Patient began session by reporting \"up and down moods\".  Over the weekend she reports feeling low energy, increased depression, and \"childlike behaviors\".  She described this as having dinner with family and looking down the whole time refusing to speak.  Patient states she just did not have energy to communicate.  Patient states she started a new relationship.  She met him in AA.  Patient hopes his relationship is more peaceful than those in the past.  She has a history of peer he chaotic interpersonal and romantic relationships with jealousy, fighting, etc.  Patient states she is working with a sponsor some but having trouble trusting.  Patient also struggling with identifying her values now that she is sober.  Patient states high cravings and triggers.  She denies using.  She denies SI/HI/AVH.      Clinical Maneuvering/Intervention:  Assisted patient in processing above session content; acknowledged and normalized patient’s thoughts, feelings, and concerns.  Utilized DBT to help patient rediscover her values.  Facilitated psychoeducation using DBT: remembering what our values in life are can be very powerful in helping tolerate stressful situations.  It can also be particularly helpful when finding that you become upset over and over again and the same situations or with the same person.  We utilized DBT exercise \"Valued Living Questionnaire\".  This exercise helped patient to see where she was dedicating a lot of her time and it was not to the components of her life that she values.  For example she values parenting as extremely important, however right now she is not working and/or paying child support or seeing her children.  She rated self-care also as " extremely important but states she is not exercising, dieting, relaxing, or doing anything of the sort to help promote self-care.     Allowed patient to freely discuss issues without interruption or judgment. Provided safe, confidential environment to facilitate the development of positive therapeutic relationship and encourage open, honest communication. Assisted patient in identifying risk factors which would indicate the need for higher level of care including thoughts to harm self or others and/or self-harming behavior and encouraged patient to contact this office, call 911, or present to the nearest emergency room should any of these events occur. Discussed crisis intervention services and means to access.  Patient adamantly and convincingly denies current suicidal or homicidal ideation or perceptual disturbance.    Assessment          Mental Status Exam  Hygiene:  good  Dress:  casual  Attitude:  Cooperative  Motor Activity:  Appropriate  Speech:  Normal  Mood:  labile  Affect:  labile  Thought Processes:  Goal directed  Thought Content:  normal  Suicidal Thoughts:  denies  Homicidal Thoughts:  denies  Crisis Safety Plan: yes, to come to the emergency room.  Hallucinations:  denies    Patient's Support Network Includes:  mother    Functional Status: No impairment    Prognosis: Fair with Ongoing Treatment     SHORT-TERM GOALS: Patient will be compliant with clinic appointments.  Patient will be engaged in therapy, medication compliant with minimal side effects. Patient  will report decrease of symptoms and frequency.     LONG-TERM GOALS: Patient will have minimal symptoms of  with continued medication management. Patient will be compliant with treatment and appointments.    Plan     Patient will adhere to medication regimen as prescribed and report any side effects. Patient will contact this office, call 911 or present to the nearest emergency room should suicidal or homicidal ideations occur. Patient will be  provided with Cognitive Behavioral Therapy and Solution Focused Therapy to improve functioning, maintain stability, and avoid decompensation and the need for higher level of care.           VISIT DIAGNOSIS:     ICD-10-CM ICD-9-CM   1. Borderline personality disorder F60.3 301.83   2. Methamphetamine dependence F15.20 304.40   3. Uncomplicated opioid dependence F11.20 304.00   4. Post traumatic stress disorder (PTSD) F43.10 309.81        Janet Wallis, [facility]

## 2017-11-27 ENCOUNTER — OFFICE VISIT (OUTPATIENT)
Dept: PSYCHIATRY | Facility: CLINIC | Age: 27
End: 2017-11-27

## 2017-11-27 DIAGNOSIS — F11.20 UNCOMPLICATED OPIOID DEPENDENCE (HCC): ICD-10-CM

## 2017-11-27 DIAGNOSIS — F60.3 BORDERLINE PERSONALITY DISORDER (HCC): Primary | ICD-10-CM

## 2017-11-27 DIAGNOSIS — F33.2 MDD (MAJOR DEPRESSIVE DISORDER), RECURRENT SEVERE, WITHOUT PSYCHOSIS (HCC): ICD-10-CM

## 2017-11-27 DIAGNOSIS — F15.20 METHAMPHETAMINE DEPENDENCE (HCC): ICD-10-CM

## 2017-11-27 DIAGNOSIS — F43.10 POST TRAUMATIC STRESS DISORDER (PTSD): ICD-10-CM

## 2017-11-27 PROCEDURE — 90834 PSYTX W PT 45 MINUTES: CPT | Performed by: COUNSELOR

## 2017-11-28 NOTE — PROGRESS NOTES
".Date of Service: 11.27.17  Time In: 3:00pm  Time Out: 3:45pm      PROGRESS NOTE  Data:  Zeenat Murrieta is a 27 y.o. female who [facility]:1 [facility] for regularly scheduled [facility] session at [facility].  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  She was informed today by representative that she would not be eligible to reinstate her license until September 2019.  Patient states she feels empty and bored with life.  Patient states that was the only thing she was looking forward to.  Patient continued to state it was unfair as the board delayed getting the paperwork submitted which postponed her licensure about a year and a half.  She made frequent complaints about the unfair treatment she believes the board has given to her.  She also verbalizes distressed in them and questioned their motives.  She reports high triggers and cravings to use \"because what's the point\".  She denies using.  Patient states she went to a meeting instead and came to therapy. She denies SI/HI/AVH      Clinical Maneuvering/Intervention:  The patient's experience of distress and subsequent difficulties or validated as understandable, and given her particular circumstances, thoughts, and feelings.  The patient was encouraged to facilitate her personal growth by choosing experiences that will strengthen self-awareness, personal values, and appreciation of life.  We discussed alternative options while she awaits license reinstatement such as going back to school or working as a nursing assistant.  We also discussed doing service work especially during the holidays such as cooking someone a meal or volunteering at the local shelter.  Encourage patient to resume daily 12 step meetings in communication with her sponsor. Allowed patient to freely discuss issues " without interruption or judgment. Provided safe, confidential environment to facilitate the development of positive therapeutic relationship and encourage open, honest communication. Assisted patient in identifying risk factors which would indicate the need for higher level of care including thoughts to harm self or others and/or self-harming behavior and encouraged patient to contact this office, call 911, or present to the nearest emergency room should any of these events occur. Discussed crisis intervention services and means to access.  Patient adamantly and convincingly denies current suicidal or homicidal ideation or perceptual disturbance.    Assessment          Mental Status Exam  Hygiene:  good  Dress:  casual  Attitude:  Cooperative  Motor Activity:  Restless  Speech:  Rapid  Mood:  anxious  Affect:  depressed and anxious  Thought Processes:  Goal directed  Thought Content:  normal  Suicidal Thoughts:  denies  Homicidal Thoughts:  denies  Crisis Safety Plan: yes, to come to the emergency room.  Hallucinations:  denies    Patient's Support Network Includes:  family    Functional Status: No impairment    Prognosis: Good with Ongoing Treatment     SHORT-TERM GOALS: Patient will be compliant with clinic appointments.  Patient will be engaged in therapy, medication compliant with minimal side effects. Patient  will report decrease of symptoms and frequency.     LONG-TERM GOALS: Patient will have minimal symptoms of  with continued medication management. Patient will be compliant with treatment and appointments.    Plan     Patient will adhere to medication regimen as prescribed and report any side effects. Patient will contact this office, call 911 or present to the nearest emergency room should suicidal or homicidal ideations occur. Patient will be provided with Cognitive Behavioral Therapy and Solution Focused Therapy to improve functioning, maintain stability, and avoid decompensation and the need for higher  level of care.            VISIT DIAGNOSIS:     ICD-10-CM ICD-9-CM   1. Borderline personality disorder F60.3 301.83   2. Methamphetamine dependence F15.20 304.40   3. Uncomplicated opioid dependence F11.20 304.00   4. Post traumatic stress disorder (PTSD) F43.10 309.81   5. MDD (major depressive disorder), recurrent severe, without psychosis F33.2 296.33        Janet Wallis, [facility]

## 2017-12-05 ENCOUNTER — OFFICE VISIT (OUTPATIENT)
Dept: PSYCHIATRY | Facility: CLINIC | Age: 27
End: 2017-12-05

## 2017-12-05 VITALS — BODY MASS INDEX: 37.72 KG/M2 | WEIGHT: 163 LBS | HEIGHT: 55 IN

## 2017-12-05 DIAGNOSIS — F43.10 POST TRAUMATIC STRESS DISORDER (PTSD): ICD-10-CM

## 2017-12-05 DIAGNOSIS — F11.20 UNCOMPLICATED OPIOID DEPENDENCE (HCC): ICD-10-CM

## 2017-12-05 DIAGNOSIS — F15.20 METHAMPHETAMINE DEPENDENCE (HCC): ICD-10-CM

## 2017-12-05 DIAGNOSIS — F33.2 MDD (MAJOR DEPRESSIVE DISORDER), RECURRENT SEVERE, WITHOUT PSYCHOSIS (HCC): Primary | ICD-10-CM

## 2017-12-05 PROCEDURE — 99213 OFFICE O/P EST LOW 20 MIN: CPT | Performed by: PSYCHIATRY & NEUROLOGY

## 2017-12-05 RX ORDER — QUETIAPINE FUMARATE 50 MG/1
TABLET, FILM COATED ORAL
Qty: 60 TABLET | Refills: 0 | Status: SHIPPED | OUTPATIENT
Start: 2017-12-05 | End: 2018-01-30

## 2017-12-05 NOTE — PROGRESS NOTES
"Outpatient Psychiatric Progress Note    CC: f/u MDD, opioid dep, meth dep, PTSD    HX:  Pt was seen for f/u today.  She reports that she continues to crave but denies meth/opioic nor other drug use.  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 She report mood has been somewhat irritable.  Sleep is still poor with nightmares.  Trazodone and remeron haven't been helpful.  She denies SI/HI.      I have reviewed pt's allergies and current medications.     Review of Systems   Constitutional: Negative.    Cardiovascular: Negative.    Gastrointestinal: Negative.    Neurological: Negative.      Ht 66 cm (25.98\")  Wt 73.9 kg (163 lb)  .74 kg/m2    EXAM: Neatly, casually dressed, good hygeine. Gait and station stable. No psychomotor agitation/retardation. No motor tics Speech is normal rate, amount. Associations intact. Mood \"frustrated\" affect constricted, stable.. TC-goal directed.  Denies SI/HI/VH/AH. TP-linear.  Attention and concentration are fair. Memory is intact for recent and remote events. Insight- limited judgement-fair      Encounter Diagnoses   Name Primary?   • MDD (major depressive disorder), recurrent severe, without psychosis Yes   • Post traumatic stress disorder (PTSD)    • Uncomplicated opioid dependence    • Methamphetamine dependence        Current Outpatient Prescriptions   Medication Sig Dispense Refill   • QUEtiapine (SEROquel) 50 MG tablet Take 1/2 to 1 tab po nightly, may add a dose in the morning if needed for anxiety 60 tablet 0     No current facility-administered medications for this visit.      Plan:  1. Stop trazodone   2. begin seroquel 50mg po daily.  Discussed that she should start with 25mg and increase to 50mg if needed for sleep issues associated with PTSD.  3. Continue therapy with sae lin  4. rtc 2 months    TIME IN:202  TIME OUT:2:20P  "

## 2017-12-06 ENCOUNTER — OFFICE VISIT (OUTPATIENT)
Dept: PSYCHIATRY | Facility: CLINIC | Age: 27
End: 2017-12-06

## 2017-12-06 DIAGNOSIS — F11.20 UNCOMPLICATED OPIOID DEPENDENCE (HCC): ICD-10-CM

## 2017-12-06 DIAGNOSIS — F15.20 METHAMPHETAMINE DEPENDENCE (HCC): ICD-10-CM

## 2017-12-06 DIAGNOSIS — F43.10 POST TRAUMATIC STRESS DISORDER (PTSD): ICD-10-CM

## 2017-12-06 DIAGNOSIS — F33.2 MDD (MAJOR DEPRESSIVE DISORDER), RECURRENT SEVERE, WITHOUT PSYCHOSIS (HCC): ICD-10-CM

## 2017-12-06 DIAGNOSIS — F60.3 BORDERLINE PERSONALITY DISORDER (HCC): Primary | ICD-10-CM

## 2017-12-06 PROCEDURE — 90834 PSYTX W PT 45 MINUTES: CPT | Performed by: COUNSELOR

## 2017-12-06 NOTE — PROGRESS NOTES
".Date of Service: 12.06.17  Time In: 8:30AM   Time Out: 9:15AM      PROGRESS NOTE  Data:  Zeenat Murrieta is a 27 y.o. female who [facility] scheduled [facility] session at [facility]. Patient began session by stating \"I am pissed off at the world\". Patient states she met with Dr. Benton yesterday for medication management. Patient states she explained to MD that she wanted to be made into a \"zombie\" because \"I am too intense for everyone\". Patient states that basically feeling sedated would be the reasonable thing to do because she cannot afford \"go off\" and possibly lose the support of her mother, grandmother, or boyfriend and expect to stay sober. Patient states she has been seriously considering using again, however not \"dope\" by instead marijuana or xanax because it could possible help calm her mood shifts. Patient explained over the past few days she has going back and forth between playfully laughing constantly and completely quiet and closed off. Patient denies self-harm but states she has thought about \"gaining a bunch of weight or starving myself\" because it is something that she feels in control of. Patient states she has not used alcohol or other drugs. Patient denies SI/HI/AVH.       Clinical Maneuvering/Intervention:  Assisted patient in processing above session content; acknowledged and normalized patient’s thoughts, feelings, and concerns. Therapist provided psychoeducation on DBT: Wise Mind States of Mind and facilitated open discussion regarding patient's current stressful circumstances using the wise mind guide. Conti Mind is one of the three mind states described in DBT specifically used for BPD, which includes Emotion Mind and Reasonable Mind. Reasonable Mind is often thought of as logical and fact-based. Reasonable Mind does not take into account emotion.Emotion Mind is ruled by emotions. It is often impulsive and acted upon " due to intense feelings and a sense of urgency. Emotion Mind does not take into account logic or reason.The synthesis of the two or the balance of both is Wise Mind. Wise Mind is our place of inner wisdom or intuitive knowing. Reason and emotion come together as a whole. We make better, more balanced choices when we make them from Wise Mind. Some examples of Wise Mind could include taking a timeout in the middle of a fight to avoid saying something you don’t mean, following your gut and not walking in a dark alley at night, or finding time to meditate during a stressful workday and taking care of yourself. In DBT, it’s assumed that everyone is capable of accessing this inner wisdom.      Allowed patient to freely discuss issues without interruption or judgment. Provided safe, confidential environment to facilitate the development of positive therapeutic relationship and encourage open, honest communication. Assisted patient in identifying risk factors which would indicate the need for higher level of care including thoughts to harm self or others and/or self-harming behavior and encouraged patient to contact this office, call 911, or present to the nearest emergency room should any of these events occur. Discussed crisis intervention services and means to access.  Patient adamantly and convincingly denies current suicidal or homicidal ideation or perceptual disturbance.    Assessment          Mental Status Exam  Hygiene:  good  Dress:  casual  Attitude:  Cooperative  Motor Activity:  Restless  Speech:  Rambling  Mood:  anxious  Affect:  anxious  Thought Processes:  Goal directed  Thought Content:  normal  Suicidal Thoughts:  denies  Homicidal Thoughts:  denies  Crisis Safety Plan: yes, to come to the emergency room.  Hallucinations:  denies    Patient's Support Network Includes:  Family    Functional Status: No impairment    Prognosis: Fair with Ongoing Treatment     SHORT-TERM GOALS: Patient will be compliant with  clinic appointments.  Patient will be engaged in therapy, medication compliant with minimal side effects. Patient  will report decrease of symptoms and frequency.     LONG-TERM GOALS: Patient will have minimal symptoms of  with continued medication management. Patient will be compliant with treatment and appointments.    Plan     Patient will adhere to medication regimen as prescribed and report any side effects. Patient will contact this office, call 911 or present to the nearest emergency room should suicidal or homicidal ideations occur. Patient will be provided with Cognitive Behavioral Therapy and Solution Focused Therapy to improve functioning, maintain stability, and avoid decompensation and the need for higher level of care.            VISIT DIAGNOSIS:     ICD-10-CM ICD-9-CM   1. Borderline personality disorder F60.3 301.83   2. MDD (major depressive disorder), recurrent severe, without psychosis F33.2 296.33   3. Post traumatic stress disorder (PTSD) F43.10 309.81   4. Uncomplicated opioid dependence F11.20 304.00   5. Methamphetamine dependence F15.20 304.40        Janet Wallis, [facility]

## 2017-12-11 ENCOUNTER — OFFICE VISIT (OUTPATIENT)
Dept: PSYCHIATRY | Facility: CLINIC | Age: 27
End: 2017-12-11

## 2017-12-11 DIAGNOSIS — F15.20 METHAMPHETAMINE DEPENDENCE (HCC): ICD-10-CM

## 2017-12-11 DIAGNOSIS — F43.10 POST TRAUMATIC STRESS DISORDER (PTSD): ICD-10-CM

## 2017-12-11 DIAGNOSIS — F33.2 MDD (MAJOR DEPRESSIVE DISORDER), RECURRENT SEVERE, WITHOUT PSYCHOSIS (HCC): ICD-10-CM

## 2017-12-11 DIAGNOSIS — F60.3 BORDERLINE PERSONALITY DISORDER (HCC): Primary | ICD-10-CM

## 2017-12-11 DIAGNOSIS — F11.20 UNCOMPLICATED OPIOID DEPENDENCE (HCC): ICD-10-CM

## 2017-12-11 PROCEDURE — 90834 PSYTX W PT 45 MINUTES: CPT | Performed by: COUNSELOR
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
## 2017-12-20 ENCOUNTER — OFFICE VISIT (OUTPATIENT)
Dept: PSYCHIATRY | Facility: CLINIC | Age: 27
End: 2017-12-20

## 2017-12-20 DIAGNOSIS — F60.3 BORDERLINE PERSONALITY DISORDER (HCC): Primary | ICD-10-CM

## 2017-12-20 DIAGNOSIS — F11.20 UNCOMPLICATED OPIOID DEPENDENCE (HCC): ICD-10-CM

## 2017-12-20 DIAGNOSIS — F33.2 MDD (MAJOR DEPRESSIVE DISORDER), RECURRENT SEVERE, WITHOUT PSYCHOSIS (HCC): ICD-10-CM

## 2017-12-20 DIAGNOSIS — F43.10 POST TRAUMATIC STRESS DISORDER (PTSD): ICD-10-CM

## 2017-12-20 DIAGNOSIS — F15.20 METHAMPHETAMINE DEPENDENCE (HCC): ICD-10-CM

## 2017-12-20 PROCEDURE — 90834 PSYTX W PT 45 MINUTES: CPT | Performed by: COUNSELOR
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
## 2018-01-04 ENCOUNTER — OFFICE VISIT (OUTPATIENT)
Dept: PSYCHIATRY | Facility: CLINIC | Age: 28
End: 2018-01-04

## 2018-01-04 DIAGNOSIS — F43.10 POST TRAUMATIC STRESS DISORDER (PTSD): ICD-10-CM

## 2018-01-04 DIAGNOSIS — F60.3 BORDERLINE PERSONALITY DISORDER (HCC): Primary | ICD-10-CM

## 2018-01-04 DIAGNOSIS — F11.20 UNCOMPLICATED OPIOID DEPENDENCE (HCC): ICD-10-CM

## 2018-01-04 DIAGNOSIS — F33.2 MDD (MAJOR DEPRESSIVE DISORDER), RECURRENT SEVERE, WITHOUT PSYCHOSIS (HCC): ICD-10-CM

## 2018-01-04 DIAGNOSIS — F15.20 METHAMPHETAMINE DEPENDENCE (HCC): ICD-10-CM

## 2018-01-04 PROCEDURE — 90834 PSYTX W PT 45 MINUTES: CPT | Performed by: COUNSELOR

## 2018-01-04 NOTE — PROGRESS NOTES
"Date of Service: 01.04.14  Time In: 2:45 PM  Time Out: 3:30 PM      PROGRESS NOTE  Data:  Zeenat Murrieta is a 27 y.o. female who met 1:1 with [facility] for regularly scheduled psychotherapy session at [facility].  Patient admitted to a relapse.  Approximately 3 months ago she reports buying a bottle of vodka and drinking at all.  Patient states she has been hiding this from everyone including her sponsor and has been \"eating me alive\".  Patient states she has not drank or used since since that incident.  Her behavior was slightly bizarre today in comparison to previous sessions.  Her eyes were closed and she lost track of conversation.  When confronted if she was using today, she denied stating she was just tired and getting adjusted to new medication.  She denies SI/HI/AVH.      Clinical Maneuvering/Intervention:  Assisted patient in processing above session content; acknowledged and normalized patient’s thoughts, feelings, and concerns.  A discussion was held with the patient regarding the distinction between a lapse and relapse.  The session focused on redirecting the management of future situations or circumstances in which lapses could occur. Patient was assigned the exercise \"relapse triggers\".     Assisted patient in identifying risk factors which would indicate the need for higher level of care including thoughts to harm self or others and/or self-harming behavior and encouraged patient to contact this office, call 911, or present to the nearest emergency room should any of these events occur. Discussed crisis intervention services and means to access.  Patient adamantly and convincingly denies current suicidal or homicidal ideation or perceptual disturbance.    Assessment          Mental Status Exam  Hygiene:  good  Dress:  casual  Attitude:  Guarded at times; otherwise appropriate  Motor Activity:  Slow  Speech:  Normal  Mood:  sad  Affect:  depressed  Thought " Processes:  Goal directed  Thought Content:  normal  Suicidal Thoughts:  denies  Homicidal Thoughts:  denies  Crisis Safety Plan: yes, to come to the emergency room.  Hallucinations:  denies    Patient's Support Network Includes:  family    Functional Status: No impairment    Prognosis: Fair with Ongoing Treatment     SHORT-TERM GOALS: Patient will be compliant with clinic appointments.  Patient will be engaged in therapy, medication compliant with minimal side effects. Patient  will report decrease of symptoms and frequency.     LONG-TERM GOALS: Patient will have minimal symptoms of  with continued medication management. Patient will be compliant with treatment and appointments.    Plan     Patient will adhere to medication regimen as prescribed and report any side effects. Patient will contact this office, call 911 or present to the nearest emergency room should suicidal or homicidal ideations occur. Patient will be provided with Cognitive Behavioral Therapy and Solution Focused Therapy to improve functioning, maintain stability, and avoid decompensation and the need for higher level of care.           VISIT DIAGNOSIS:     ICD-10-CM ICD-9-CM   1. Borderline personality disorder F60.3 301.83   2. MDD (major depressive disorder), recurrent severe, without psychosis F33.2 296.33   3. Post traumatic stress disorder (PTSD) F43.10 309.81   4. Uncomplicated opioid dependence F11.20 304.00   5. Methamphetamine dependence F15.20 304.40        Janet Wallis, [facility]      Please note that portions of this note were completed with a voice recognition program. Efforts were made to edit dictation, but occasionally words are mistranscribed.

## 2018-01-11 ENCOUNTER — OFFICE VISIT (OUTPATIENT)
Dept: PSYCHIATRY | Facility: CLINIC | Age: 28
End: 2018-01-11

## 2018-01-11 DIAGNOSIS — F15.20 METHAMPHETAMINE DEPENDENCE (HCC): ICD-10-CM

## 2018-01-11 DIAGNOSIS — F33.2 MDD (MAJOR DEPRESSIVE DISORDER), RECURRENT SEVERE, WITHOUT PSYCHOSIS (HCC): ICD-10-CM

## 2018-01-11 DIAGNOSIS — F43.10 POST TRAUMATIC STRESS DISORDER (PTSD): ICD-10-CM

## 2018-01-11 DIAGNOSIS — F11.20 UNCOMPLICATED OPIOID DEPENDENCE (HCC): ICD-10-CM

## 2018-01-11 DIAGNOSIS — F60.3 BORDERLINE PERSONALITY DISORDER (HCC): Primary | ICD-10-CM

## 2018-01-11 PROCEDURE — 90834 PSYTX W PT 45 MINUTES: CPT | Performed by: COUNSELOR
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
## 2018-01-18 ENCOUNTER — TELEPHONE (OUTPATIENT)
Dept: PSYCHIATRY | Facility: CLINIC | Age: 28
End: 2018-01-18

## 2018-01-18 NOTE — TELEPHONE ENCOUNTER
If she has tried the 25 mg dose and it did the same thing please have her stop it completely until her next appointment.

## 2018-01-30 ENCOUNTER — OFFICE VISIT (OUTPATIENT)
Dept: PSYCHIATRY | Facility: CLINIC | Age: 28
End: 2018-01-30

## 2018-01-30 VITALS — BODY MASS INDEX: 25.07 KG/M2 | HEIGHT: 66 IN | WEIGHT: 156 LBS

## 2018-01-30 DIAGNOSIS — F33.2 MDD (MAJOR DEPRESSIVE DISORDER), RECURRENT SEVERE, WITHOUT PSYCHOSIS (HCC): Primary | ICD-10-CM

## 2018-01-30 DIAGNOSIS — F11.20 UNCOMPLICATED OPIOID DEPENDENCE (HCC): ICD-10-CM

## 2018-01-30 DIAGNOSIS — F43.10 POST TRAUMATIC STRESS DISORDER (PTSD): ICD-10-CM

## 2018-01-30 DIAGNOSIS — F15.20 METHAMPHETAMINE DEPENDENCE (HCC): ICD-10-CM

## 2018-01-30 PROCEDURE — 99214 OFFICE O/P EST MOD 30 MIN: CPT | Performed by: PSYCHIATRY & NEUROLOGY

## 2018-01-30 RX ORDER — ESCITALOPRAM OXALATE 10 MG/1
10 TABLET ORAL DAILY
Qty: 30 TABLET | Refills: 1 | Status: SHIPPED | OUTPATIENT
Start: 2018-01-30 | End: 2018-08-06

## 2018-01-30 RX ORDER — TRAZODONE HYDROCHLORIDE 100 MG/1
100 TABLET ORAL NIGHTLY PRN
Qty: 30 TABLET | Refills: 1 | Status: SHIPPED | OUTPATIENT
Start: 2018-01-30 | End: 2021-01-20 | Stop reason: SDUPTHER

## 2018-01-30 RX ORDER — HYDROXYZINE PAMOATE 25 MG/1
25 CAPSULE ORAL EVERY 4 HOURS PRN
Qty: 120 CAPSULE | Refills: 1 | OUTPATIENT
Start: 2018-01-30 | End: 2019-12-06
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
## 2018-02-07 ENCOUNTER — OFFICE VISIT (OUTPATIENT)
Dept: PSYCHIATRY | Facility: CLINIC | Age: 28
End: 2018-02-07

## 2018-02-07 DIAGNOSIS — F15.20 METHAMPHETAMINE DEPENDENCE (HCC): ICD-10-CM

## 2018-02-07 DIAGNOSIS — F11.20 UNCOMPLICATED OPIOID DEPENDENCE (HCC): ICD-10-CM

## 2018-02-07 DIAGNOSIS — F60.3 BORDERLINE PERSONALITY DISORDER (HCC): Primary | ICD-10-CM

## 2018-02-07 DIAGNOSIS — F33.2 MDD (MAJOR DEPRESSIVE DISORDER), RECURRENT SEVERE, WITHOUT PSYCHOSIS (HCC): ICD-10-CM

## 2018-02-07 PROCEDURE — 90834 PSYTX W PT 45 MINUTES: CPT | Performed by: COUNSELOR

## 2018-02-07 NOTE — PROGRESS NOTES
".Date of Service: February 7, 2018  Time In: 11 AM  Time Out: 11:45 AM      PROGRESS NOTE  Data:  Zeenat Murrieta is a 27 y.o. female who met 1:1 with CARROLL Wheeler,NCC for regularly scheduled psychotherapy session at [facility].  Patient apologized for missing her last appointment as no call no-show.  Patient states she was coming off Seroquel and \"it made me crazy\".  Patient states she has been stressed because she can't trust anyone.  The patient made frequent complaints about the unfair treatment that she believes that others have given to her.  Patient specifically talked about friends in AA/NA groups that she has opened up to and they have went behind her back to gossip.  The patient verbalized distressed in all AA members and questioned their motives.      Clinical Maneuvering/Intervention:  Assisted patient in processing above session content; acknowledged and normalized patient’s thoughts, feelings, and concerns.  Patient was noted to accept the validation about her level of distress.  DBT topics were explained to the patient, including the emphasis on exchange and negotiation, balancing the rational and emotional, and acceptance and change.     Assisted patient in identifying risk factors which would indicate the need for higher level of care including thoughts to harm self or others and/or self-harming behavior and encouraged patient to contact this office, call 911, or present to the nearest emergency room should any of these events occur. Discussed crisis intervention services and means to access.  Patient adamantly and convincingly denies current suicidal or homicidal ideation or perceptual disturbance.    Assessment          Mental Status Exam  Hygiene:  good  Dress:  casual  Attitude:  Guarded  Motor Activity:  Appropriate  Speech:  Normal  Mood:  anxious and sad  Affect:  depressed and anxious  Thought Processes:  Goal directed  Thought Content:  normal  Suicidal " Thoughts:  denies  Homicidal Thoughts:  denies  Crisis Safety Plan: yes, to come to the emergency room.  Hallucinations:  denies    Patient's Support Network Includes:  significant other, children and mother    Functional Status: No impairment    Prognosis: Fair with Ongoing Treatment     SHORT-TERM GOALS: Patient will be compliant with clinic appointments.  Patient will be engaged in therapy, medication compliant with minimal side effects. Patient  will report decrease of symptoms and frequency.     LONG-TERM GOALS: Patient will have minimal symptoms of  with continued medication management. Patient will be compliant with treatment and appointments.    Plan     Patient will adhere to medication regimen as prescribed and report any side effects. Patient will contact this office, call 911 or present to the nearest emergency room should suicidal or homicidal ideations occur. Patient will be provided with Cognitive Behavioral Therapy and Solution Focused Therapy to improve functioning, maintain stability, and avoid decompensation and the need for higher level of care.            VISIT DIAGNOSIS:     ICD-10-CM ICD-9-CM   1. Borderline personality disorder F60.3 301.83   2. MDD (major depressive disorder), recurrent severe, without psychosis F33.2 296.33   3. Uncomplicated opioid dependence F11.20 304.00   4. Methamphetamine dependence F15.20 304.40        Janet Wallis, [facility]      Please note that portions of this note were completed with a voice recognition program. Efforts were made to edit dictation, but occasionally words are mistranscribed.

## 2018-06-21 ENCOUNTER — OFFICE VISIT (OUTPATIENT)
Dept: PSYCHIATRY | Facility: CLINIC | Age: 28
End: 2018-06-21

## 2018-06-21 DIAGNOSIS — F33.2 SEVERE EPISODE OF RECURRENT MAJOR DEPRESSIVE DISORDER, WITHOUT PSYCHOTIC FEATURES (HCC): Primary | ICD-10-CM

## 2018-06-21 DIAGNOSIS — F33.2 MDD (MAJOR DEPRESSIVE DISORDER), RECURRENT SEVERE, WITHOUT PSYCHOSIS (HCC): ICD-10-CM

## 2018-06-21 DIAGNOSIS — F12.20 MARIJUANA DEPENDENCE (HCC): ICD-10-CM

## 2018-06-21 DIAGNOSIS — F10.20 UNCOMPLICATED ALCOHOL DEPENDENCE (HCC): ICD-10-CM

## 2018-06-21 PROCEDURE — 90834 PSYTX W PT 45 MINUTES: CPT | Performed by: COUNSELOR

## 2018-06-21 NOTE — PROGRESS NOTES
".Date of Service: June 21, 2018  Time In: 9:15 AM  Time Out: 9:55 AM      PROGRESS NOTE  Data:  Zeenat Murrieta is a 27 y.o. female who presents for individual therapy session at [facility].  Patient's last session was in early February.  She successfully completed CD IOP Phases I & II at that time. Patient began to miss group and individual sessions, eventually stopping all together.     She has since relapsed on alcohol and marijuana.  She also used cocaine and Adderall back in the spring a few times.  Patient states she was able to stop drinking on her own.  It's been almost 2 months since her last drink of alcohol.  Patient is still struggling with anxiety and self-medicating by smoking marijuana.  She reports smoking half a gram a day on average.  She last smoked yesterday.  Patient states she is here today because she wants to commit to a plan of abstinence and would like to try chemical dependency intensive outpatient program again, as she was largely successful the first time.  Patient acknowledges that alcohol and drug abuse has been the cause of multiple problems in her life and verbalizes a strong desire to maintain a life free from using all mood altering substances.  She would like to \"regroup\" and \"refocus\" on her recovery.    Patient reported not much has changed in terms of her environment. She still lives with her mother. Her long-term  boyfriend who is supportive of her recovery, has little knowledge about the disease of addiction. She recently lost her job as a  at [employer] due to the company shutting down. She is looking for a new job.     Patient adamantly and convincingly denies current suicidal or homicidal ideation or perceptual disturbance.    Clinical Maneuvering/Intervention:  Assisted patient in processing above session content; acknowledged and normalized patient’s thoughts, feelings, and concerns.  Open ended questions were used to help the patient " explore her motivation for change.  Patient was affirmed for her change related statements and efforts.  Patient was encouraged in regard to the goal of developing an action plan to rejoin IOP for termination of substance use and to refocus on her recovery.  She was scheduled for IOP on the next group day, Monday June 25th.      Assisted patient in identifying risk factors which would indicate the need for higher level of care including thoughts to harm self or others and/or self-harming behavior and encouraged patient to contact this office, call 911, or present to the nearest emergency room should any of these events occur. Discussed crisis intervention services and means to access.      Assessment          Mental Status Exam  Hygiene:  good  Dress:  casual  Attitude:  Cooperative  Motor Activity:  Appropriate  Speech:  Normal  Mood:  anxious  Affect:  calm and pleasant and anxious  Thought Processes:  Goal directed  Thought Content:  normal  Suicidal Thoughts:  denies  Homicidal Thoughts:  denies  Crisis Safety Plan: yes, to come to the emergency room.  Hallucinations:  denies    Patient's Support Network Includes:  significant other, children, mother and extended family    Functional Status: No impairment    Prognosis: Good with Ongoing Treatment       Plan     Recommend IOP starting on June 25, 2018 with MONSE Padgett.       Patient will contact this office, call 911 or present to the nearest emergency room should suicidal or homicidal ideations occur. Patient will be provided with Cognitive Behavioral Therapy and Solution Focused Therapy to improve functioning, maintain stability, and avoid decompensation and the need for higher level of care.            VISIT DIAGNOSIS:     ICD-10-CM ICD-9-CM   1. Severe episode of recurrent major depressive disorder, without psychotic features F33.2 296.33   2. Marijuana dependence F12.20 304.30   3. Uncomplicated alcohol dependence F10.20 303.90        Janet  Moraima Wallis, [facility]      Please note that portions of this note were completed with a voice recognition program. Efforts were made to edit dictation, but occasionally words are mistranscribed.

## 2018-08-06 ENCOUNTER — OFFICE VISIT (OUTPATIENT)
Dept: INTERNAL MEDICINE | Facility: CLINIC | Age: 28
End: 2018-08-06

## 2018-08-06 ENCOUNTER — RESULTS ENCOUNTER (OUTPATIENT)
Dept: INTERNAL MEDICINE | Facility: CLINIC | Age: 28
End: 2018-08-06

## 2018-08-06 VITALS
SYSTOLIC BLOOD PRESSURE: 108 MMHG | DIASTOLIC BLOOD PRESSURE: 68 MMHG | TEMPERATURE: 98.4 F | RESPIRATION RATE: 14 BRPM | HEIGHT: 66 IN | WEIGHT: 152.75 LBS | HEART RATE: 70 BPM | BODY MASS INDEX: 24.55 KG/M2

## 2018-08-06 DIAGNOSIS — F19.11 HISTORY OF SUBSTANCE ABUSE (HCC): ICD-10-CM

## 2018-08-06 DIAGNOSIS — F33.0 MILD EPISODE OF RECURRENT MAJOR DEPRESSIVE DISORDER (HCC): ICD-10-CM

## 2018-08-06 DIAGNOSIS — Z86.19 HISTORY OF HPV INFECTION: ICD-10-CM

## 2018-08-06 DIAGNOSIS — Z76.89 ENCOUNTER TO ESTABLISH CARE: Primary | ICD-10-CM

## 2018-08-06 PROCEDURE — 99214 OFFICE O/P EST MOD 30 MIN: CPT | Performed by: NURSE PRACTITIONER

## 2018-08-06 RX ORDER — IBUPROFEN 400 MG/1
400 TABLET ORAL 3 TIMES DAILY
COMMUNITY
End: 2019-12-06
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
## 2018-08-27 ENCOUNTER — TELEPHONE (OUTPATIENT)
Dept: INTERNAL MEDICINE | Facility: CLINIC | Age: 28
End: 2018-08-27

## 2018-08-30 ENCOUNTER — PROCEDURE VISIT (OUTPATIENT)
Dept: OBSTETRICS AND GYNECOLOGY | Facility: CLINIC | Age: 28
End: 2018-08-30

## 2018-08-30 VITALS
DIASTOLIC BLOOD PRESSURE: 70 MMHG | WEIGHT: 155 LBS | HEIGHT: 66 IN | SYSTOLIC BLOOD PRESSURE: 118 MMHG | BODY MASS INDEX: 24.91 KG/M2

## 2018-08-30 DIAGNOSIS — Z01.419 ENCOUNTER FOR GYNECOLOGICAL EXAMINATION WITHOUT ABNORMAL FINDING: Primary | ICD-10-CM

## 2018-08-30 DIAGNOSIS — N90.89 LESION OF VULVA: ICD-10-CM

## 2018-08-30 PROCEDURE — 99385 PREV VISIT NEW AGE 18-39: CPT | Performed by: OBSTETRICS & GYNECOLOGY

## 2018-08-30 NOTE — PROGRESS NOTES
Subjective   Chief Complaint   Patient presents with   • Gynecologic Exam     Last pap 2 years ago @ [facility], [place]  done in       Zeenat Murrieta is a 28 y.o. year old  presenting to be seen for her annual exam. C/O bilateral gluteal lesions    SEXUAL Hx:  She is currently sexually active.  In the past year there has not been new sexual partners.    Condoms are not typically used.  She would not like to be screened for STD's at today's exam.  Current birth control method: slapingectomy.  MENSTRUAL Hx:  Patient's last menstrual period was 2018.  In the past 6 months her cycles have been regular, predictable and occur monthly.   Her menstrual flow is normal.   Each month on average there are roughly 0 days of very heavy flow.    Intermenstrual bleeding is absent.    Post-coital bleeding is absent.  Dysmenorrhea: is not affecting her activities of daily living  PMS: is not affecting her activities of daily living  Her cycles are not a source of concern for her that she wishes to discuss today.  HEALTH Hx:  She exercises regularly: no (and has no plans to become more active).  She wears her seat belt:yes.  She has concerns about domestic violence: no.  OTHER COMPLAINTS:  Nothing else    The following portions of the patient's history were reviewed and updated as appropriate:  She  has a past medical history of Abnormal Pap smear of cervix; Anxiety; Heroin overdose; HPV in female; Major depressive disorder; and Suicide attempt.  She  does not have any pertinent problems on file.  She  has a past surgical history that includes Cerclage cervix; d&c hysteroscopy; Cervical biopsy w/ loop electrode excision; Salpingectomy; and Knee surgery.  Her family history includes Alcohol abuse in her mother; Anxiety disorder in her mother and sister; Bipolar disorder in her sister; Depression in her mother; Schizophrenia in her sister.  She  reports that she has been smoking.  She has a 14.00  "pack-year smoking history. She has never used smokeless tobacco. She reports that she drinks alcohol. She reports that she uses drugs, including Methamphetamines and Heroin.  Current Outpatient Prescriptions   Medication Sig Dispense Refill   • hydrOXYzine (VISTARIL) 25 MG capsule Take 1 capsule by mouth Every 4 (Four) Hours As Needed for Anxiety. 120 capsule 1   • ibuprofen (ADVIL,MOTRIN) 400 MG tablet Take 400 mg by mouth 3 (Three) Times a Day.     • traZODone (DESYREL) 100 MG tablet Take 1 tablet by mouth At Night As Needed for Sleep. 30 tablet 1     No current facility-administered medications for this visit.      Current Outpatient Prescriptions on File Prior to Visit   Medication Sig   • hydrOXYzine (VISTARIL) 25 MG capsule Take 1 capsule by mouth Every 4 (Four) Hours As Needed for Anxiety.   • ibuprofen (ADVIL,MOTRIN) 400 MG tablet Take 400 mg by mouth 3 (Three) Times a Day.   • traZODone (DESYREL) 100 MG tablet Take 1 tablet by mouth At Night As Needed for Sleep.     No current facility-administered medications on file prior to visit.      She is allergic to atarax [hydroxyzine]..    Smoking status: Current Every Day Smoker                                                   Packs/day: 1.00      Years: 14.00     Smokeless tobacco: Never Used                        Review of Systems  Consitutional NEG: anorexia or night sweats    POS: nothing reported   Gastointestinal NEG: bloating, change in bowel habits, melena or reflux symptoms    POS: nothing reported   Genitourinary NEG: dysuria or hematuria    POS: nothing reported   Integument NEG: moles that are changing in size, shape, color or rashes    POS: nothing reported   Breast NEG: persistent breast lump, skin dimpling or nipple discharge    POS: nothing reported          Objective   /70   Ht 167.6 cm (66\")   Wt 70.3 kg (155 lb)   LMP 08/03/2018   BMI 25.02 kg/m²     General:  well developed; well nourished  no acute distress   Skin:  No suspicious " lesions seen   Thyroid: normal to inspection and palpation   Breasts:  Examined in supine position  Symmetric without masses or skin dimpling  Nipples normal without inversion, lesions or discharge  There are no palpable axillary nodes   Abdomen: soft, non-tender; no masses  no umbilical or inginual hernias are present  no hepato-splenomegaly   Pelvis: Clinical staff was present for exam  External genitalia:  normal appearance of the external genitalia including Bartholin's and Southaven's glands.  :  urethral meatus normal;  Vaginal:  normal pink mucosa without prolapse or lesions.  Cervix:  normal appearance.  Uterus:  normal size, shape and consistency.  Adnexa:  normal bimanual exam of the adnexa.  Rectal:  digital rectal exam not performed; anus visually normal appearing.        Assessment   1. Normal PE     Plan   1. Pap done  2. Bilateral gluteal/ vulvar lesions-- RTC for removal  3. Follow up     No orders of the defined types were placed in this encounter.         This note was electronically signed.      August 30, 2018

## 2018-09-12 DIAGNOSIS — Z01.419 ENCOUNTER FOR GYNECOLOGICAL EXAMINATION WITHOUT ABNORMAL FINDING: ICD-10-CM

## 2018-09-17 ENCOUNTER — OFFICE VISIT (OUTPATIENT)
Dept: PSYCHIATRY | Facility: CLINIC | Age: 28
End: 2018-09-17

## 2018-09-17 VITALS — WEIGHT: 156 LBS | BODY MASS INDEX: 25.07 KG/M2 | HEIGHT: 66 IN

## 2018-09-17 DIAGNOSIS — F60.3 BORDERLINE PERSONALITY DISORDER (HCC): ICD-10-CM

## 2018-09-17 DIAGNOSIS — F12.20 MARIJUANA DEPENDENCE (HCC): ICD-10-CM

## 2018-09-17 DIAGNOSIS — F15.20 METHAMPHETAMINE DEPENDENCE (HCC): ICD-10-CM

## 2018-09-17 DIAGNOSIS — F33.2 SEVERE EPISODE OF RECURRENT MAJOR DEPRESSIVE DISORDER, WITHOUT PSYCHOTIC FEATURES (HCC): Primary | ICD-10-CM

## 2018-09-17 DIAGNOSIS — F10.20 UNCOMPLICATED ALCOHOL DEPENDENCE (HCC): ICD-10-CM

## 2018-09-17 DIAGNOSIS — F11.20 UNCOMPLICATED OPIOID DEPENDENCE (HCC): ICD-10-CM

## 2018-09-17 PROCEDURE — 99214 OFFICE O/P EST MOD 30 MIN: CPT | Performed by: NURSE PRACTITIONER

## 2018-09-17 RX ORDER — PROPRANOLOL HYDROCHLORIDE 10 MG/1
10 TABLET ORAL 2 TIMES DAILY PRN
Qty: 60 TABLET | Refills: 0 | Status: SHIPPED | OUTPATIENT
Start: 2018-09-17 | End: 2019-07-03

## 2018-09-17 RX ORDER — VENLAFAXINE HYDROCHLORIDE 37.5 MG/1
37.5 CAPSULE, EXTENDED RELEASE ORAL DAILY
Qty: 30 CAPSULE | Refills: 0 | Status: SHIPPED | OUTPATIENT
Start: 2018-09-17 | End: 2019-07-03

## 2018-09-17 NOTE — PROGRESS NOTES
"  Subjective   Zeenat Murrieta is a 28 y.o. female who is here today for medication management follow up.    Chief Complaint:    Severe episode of recurrent major depressive disorder, without psychotic features (CMS/HCC)    Marijuana dependence (CMS/HCC)    Uncomplicated alcohol dependence (CMS/HCC)    Borderline personality disorder    Methamphetamine dependence (CMS/HCC)    Uncomplicated opioid dependence (CMS/HCC)      History of Present Illness Patient presents by herself but boyfriend in waiting room. Pt hasn't been seen since June by Janet [facility] and they had arranged IOP but pt didn't come. She has been working and has relapsed on alcohol and cannabis she reports last in July.   She reports  Sleeping well. Hasn't seen made appointments with Dr. Benton since January, no shows. 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 Lives with mother, denies SI or AVH. Denies alcohol use since July. Has poor motivation or interest, poor self worth. Denies self harming.   Denies cravings past month but states knows with depression and anxiety she will start having to fight cravings      The following portions of the patient's history were reviewed and updated as appropriate: allergies, current medications, past family history, past medical history, past social history, past surgical history and problem list.    Review of Systemsdenies fever, cough, s/s’s of infection, denies GI/ problems, denies new medical issues     Objective   Physical Exam  Height 167.6 cm (66\"), weight 70.8 kg (156 lb).    Allergies   Allergen Reactions   • Atarax [Hydroxyzine] GI Intolerance       Current Medications:   Current Outpatient Prescriptions   Medication Sig Dispense Refill   • hydrOXYzine (VISTARIL) 25 MG capsule Take 1 capsule by mouth Every 4 (Four) Hours As Needed for Anxiety. 120 capsule 1   • ibuprofen (ADVIL,MOTRIN) 400 MG tablet Take 400 mg by mouth 3 (Three) Times a Day.     • propranolol (INDERAL) " 10 MG tablet Take 1 tablet by mouth 2 (Two) Times a Day As Needed (anxiety). 60 tablet 0   • traZODone (DESYREL) 100 MG tablet Take 1 tablet by mouth At Night As Needed for Sleep. 30 tablet 1   • venlafaxine XR (EFFEXOR-XR) 37.5 MG 24 hr capsule Take 1 capsule by mouth Daily. 30 capsule 0     No current facility-administered medications for this visit.      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 alcohol dependence  Judgement:  Impaired   Impulse Control:  Fair  Estimated Intelligence: average range    PRAVEEN REVIEWED NO RED FLAGSRequest # : [id number]      Assessment/Plan   Diagnoses and all orders for this visit:    Severe episode of recurrent major depressive disorder, without psychotic features (CMS/HCC)    Marijuana dependence (CMS/HCC)    Uncomplicated alcohol dependence (CMS/HCC)    Borderline personality disorder    Methamphetamine dependence (CMS/HCC)    Uncomplicated opioid dependence (CMS/HCC)    Other orders  -     venlafaxine XR (EFFEXOR-XR) 37.5 MG 24 hr capsule; Take 1 capsule by mouth Daily.  -     propranolol (INDERAL) 10 MG tablet; Take 1 tablet by mouth 2 (Two) Times a Day As Needed (anxiety).    25 minutes face to face , discussing dependence, importance of insight, wanting change, IOP, rationale for med management,, reviewed GENESIGHT TESTING WITH PT     IMPRESSION:  MDD recurrent severe without psychotic features. Ongoing relapses with alcohol and cannabis.   PLAN:   Begin venlafaxine XR 37.5mg PO one QD x 5 days then  2 a day, to call when down to a couple capsules and will adjust med as necessary for depression   Begin propranolol 10mg po bid for anxiety    Reports sleeping well so won't restart Trazodone.     Encouraged IOP  Encouraged therapy Janet on leave, gave pt other therapists in local area takes her insurance    We discussed risks, benefits, and side effects of the above medications and the patient was agreeable with the plan. Patient was educated on the importance of compliance with treatment and follow-up appointments.     Sleep hygiene reviewed, encouraged more whole foods, less processed foods, fruits vegetables , more activity   Coping skills reviewed and encouraged positive framing of thoughts    Assisted patient in processing above session content; acknowledged and normalized patient’s thoughts, feelings, and concerns.  Applied  positive coping skills and behavior management in session.  Allowed patient to freely discuss issues without interruption or judgment. Provided safe, confidential environment to facilitate the development of positive therapeutic relationship and encourage open, honest communication. Assisted patient in identifying risk factors which would indicate the need for higher level of care including thoughts to harm self or others and/or self-harming behavior and encouraged patient to contact this office, call 911, or present to the nearest emergency room should any of these events occur. Discussed crisis intervention services and means to access.  Patient adamantly and convincingly denies current suicidal or homicidal ideation or perceptual disturbance.    Treatment Plan: stabilize mood, patient will stay out of the hospital and be at optimal level of functioning, take all medication as prescribed. Patient verbalized  understanding and agreement to plan.    Instructed to call for questions or concerns and return early if necessary. Crisis plan reviewed including going to the Emergency  department.    Return in about 4 weeks (around 10/15/2018).

## 2018-09-18 ENCOUNTER — OFFICE VISIT (OUTPATIENT)
Dept: OBSTETRICS AND GYNECOLOGY | Facility: CLINIC | Age: 28
End: 2018-09-18

## 2018-09-18 VITALS
DIASTOLIC BLOOD PRESSURE: 70 MMHG | SYSTOLIC BLOOD PRESSURE: 118 MMHG | WEIGHT: 155 LBS | BODY MASS INDEX: 24.91 KG/M2 | HEIGHT: 66 IN

## 2018-09-18 DIAGNOSIS — N90.89 VULVAR LESION: Primary | ICD-10-CM

## 2018-09-18 PROCEDURE — 11200 RMVL SKIN TAGS UP TO&INC 15: CPT | Performed by: OBSTETRICS & GYNECOLOGY

## 2018-09-18 PROCEDURE — 99213 OFFICE O/P EST LOW 20 MIN: CPT | Performed by: OBSTETRICS & GYNECOLOGY

## 2018-09-18 RX ORDER — SULFAMETHOXAZOLE AND TRIMETHOPRIM 800; 160 MG/1; MG/1
1 TABLET ORAL 2 TIMES DAILY
Qty: 14 TABLET | Refills: 0 | Status: SHIPPED | OUTPATIENT
Start: 2018-09-18 | End: 2018-09-25

## 2018-09-18 NOTE — PROGRESS NOTES
Subjective   Chief Complaint   Patient presents with   • skin tag removal     Zeenat Murrieta is a 28 y.o. year old .  No LMP recorded.  She presents to be seen because of bilateral irritating gyn skin tags. Also right inguinal boil for one week.     OTHER COMPLAINTS:  Nothing else    The following portions of the patient's history were reviewed and updated as appropriate:  She  has a past medical history of Abnormal Pap smear of cervix; Anxiety; Heroin overdose; HPV in female; Major depressive disorder; and Suicide attempt.  She  does not have any pertinent problems on file.  She  has a past surgical history that includes Cerclage cervix; d&c hysteroscopy; Cervical biopsy w/ loop electrode excision; Salpingectomy; and Knee surgery.  Her family history includes Alcohol abuse in her mother; Anxiety disorder in her mother and sister; Bipolar disorder in her sister; Depression in her mother; Schizophrenia in her sister.  She  reports that she has been smoking.  She has a 14.00 pack-year smoking history. She has never used smokeless tobacco. She reports that she drinks alcohol. She reports that she uses drugs, including Methamphetamines and Heroin.  Current Outpatient Prescriptions   Medication Sig Dispense Refill   • hydrOXYzine (VISTARIL) 25 MG capsule Take 1 capsule by mouth Every 4 (Four) Hours As Needed for Anxiety. 120 capsule 1   • ibuprofen (ADVIL,MOTRIN) 400 MG tablet Take 400 mg by mouth 3 (Three) Times a Day.     • propranolol (INDERAL) 10 MG tablet Take 1 tablet by mouth 2 (Two) Times a Day As Needed (anxiety). 60 tablet 0   • traZODone (DESYREL) 100 MG tablet Take 1 tablet by mouth At Night As Needed for Sleep. 30 tablet 1   • venlafaxine XR (EFFEXOR-XR) 37.5 MG 24 hr capsule Take 1 capsule by mouth Daily. 30 capsule 0     No current facility-administered medications for this visit.      Current Outpatient Prescriptions on File Prior to Visit   Medication Sig   • hydrOXYzine (VISTARIL) 25 MG capsule  "Take 1 capsule by mouth Every 4 (Four) Hours As Needed for Anxiety.   • ibuprofen (ADVIL,MOTRIN) 400 MG tablet Take 400 mg by mouth 3 (Three) Times a Day.   • propranolol (INDERAL) 10 MG tablet Take 1 tablet by mouth 2 (Two) Times a Day As Needed (anxiety).   • traZODone (DESYREL) 100 MG tablet Take 1 tablet by mouth At Night As Needed for Sleep.   • venlafaxine XR (EFFEXOR-XR) 37.5 MG 24 hr capsule Take 1 capsule by mouth Daily.     No current facility-administered medications on file prior to visit.      She is allergic to atarax [hydroxyzine].    Smoking status: Current Every Day Smoker                                                   Packs/day: 1.00      Years: 14.00     Smokeless tobacco: Never Used                        Review of Systems  Consitutional POS: nothing reported    NEG: anorexia or night sweats   Gastointestinal POS: nothing reported    NEG: bloating, change in bowel habits, melena or reflux symptoms   Genitourinary POS: nothing reported    NEG: dysuria or hematuria   Integument POS: nothing reported    NEG: moles that are changing in size, shape, color or rashes   Breast POS: nothing reported    NEG: persistent breast lump, skin dimpling or nipple discharge         Respiratory: negative  Cardiovascular: negative          Objective   /70   Ht 167.6 cm (66\")   Wt 70.3 kg (155 lb)   BMI 25.02 kg/m²     General:  well developed; well nourished  no acute distress   Skin:  Not performed.   Thyroid: not examined   Lungs:  breathing is unlabored   Heart:  regular rate and rhythm, S1, S2 normal, no murmur, click, rub or gallop   Breasts:  Not performed.   Abdomen: Not performed.   Pelvis: Clinical staff was present for exam  External genitalia:  normal appearance of the external genitalia including Bartholin's and [place]'s glands. bilateral skin tags     Psychiatric: Alert and oriented ×3, mood and affect appropriate  HEENT: Atraumatic, normocephalic, normal scleral icterus  Extremities: 2+ " pulses bilaterally, no edema      Lab Review   No data reviewed    Imaging   No data reviewed        Assessment   1. ETOH prep, 1% lidocaine injected, tages sshaved off with scalpel, silver nitrate applied, no complications     Plan   1. Bactrim DC BID x 7 days  2.     No orders of the defined types were placed in this encounter.         This note was electronically signed.      September 18, 2018

## 2018-09-24 DIAGNOSIS — N90.89 VULVAR LESION: ICD-10-CM

## 2019-04-01 NOTE — PROGRESS NOTES
"      PROGRESS NOTE  Clinician: SHAY Pearce MD  Admission Date: 7/2/2017  7:58 AM 07/05/17    Behavioral Health Treatment Plan and Problem List: I have reviewed and approved the Behavioral Health Treatment Plan and Problem list.    Allergies  Allergies   Allergen Reactions   • Atarax [Hydroxyzine] GI Intolerance       Hospital Day: 3 days      Assessment completed within view of staff    History    CC: \"Depression is better\".     Followed for: Depression/ Substance Dependency.     Interval HPI: Patient seen and evaluated by me.  Chart reviewed.  Talked with her mother yesterday, continues to be supportive. Discouraged with the situation with her children, has a long trying road back, hopefully this last lapse back to drugs is not a recurrent pattern. To align the patient with outpatient services and with a treatment plan anticipating discharge tomorrow. Her depression is improving, trace of optimism, no SI demonstrated.    Interval Review of Systems:   General ROS: negative for - fever or malaise  Endocrine ROS: negative for - palpitations  Respiratory ROS: no cough, shortness of breath, or wheezing  Cardiovascular ROS: no chest pain or dyspnea on exertion  Gastrointestinal ROS: no abdominal pain,no black or bloody stools    BP 97/54 (BP Location: Right arm, Patient Position: Lying)  Pulse 56  Temp 97.8 °F (36.6 °C) (Temporal Artery )   Resp 18  SpO2 96%    Mental Status Exam    Mood/Affect: depressed and blunted  Thought Processes:  linear, logical, and goal directed  Thought Content:  Negativistic  Hallucination(s): none  Hopelessness: no  Optimistic:minimally  Suicidal Thoughts:  none  Suicidal Plan/Intent: none  Homicidal Thoughts:  absent      Medical Decision Making:   All recent completed LAB results reviewed and discussed with the patient.        Radiology:     Imaging Results (last 24 hours)     ** No results found for the last 24 hours. **            EKG:     ECG/EMG Results (most recent)     " None           Medications:     nicotine 1 patch Transdermal Daily   sertraline 50 mg Oral Daily       •  acetaminophen  •  aluminum-magnesium hydroxide-simethicone  •  benzonatate  •  benztropine **OR** benztropine  •  [] CloNIDine **FOLLOWED BY** CloNIDine **FOLLOWED BY** CloNIDine **FOLLOWED BY** [START ON 2017] CloNIDine  •  cyclobenzaprine  •  dicyclomine  •  famotidine  •  loperamide  •  magnesium hydroxide  •  ondansetron  •  sodium chloride  •  traZODone   All medications reviewed.    Special Precautions: Continue current level of Special Precautions.    Assessment/Diagnosis: Active Problems:    Severe major depression      Treatment Plan: Continue current treatment plan.    Attestation:   Physician Attestation: I attest that the above note accurately reflects work and decisions made by me.             No

## 2019-06-19 ENCOUNTER — HOSPITAL ENCOUNTER (EMERGENCY)
Facility: HOSPITAL | Age: 29
Discharge: HOME | End: 2019-06-19
Attending: EMERGENCY MEDICINE | Admitting: EMERGENCY MEDICINE

## 2019-06-19 ENCOUNTER — APPOINTMENT (OUTPATIENT)
Dept: GENERAL RADIOLOGY | Facility: HOSPITAL | Age: 29
End: 2019-06-19

## 2019-06-19 VITALS
OXYGEN SATURATION: 96 % | BODY MASS INDEX: 26.03 KG/M2 | WEIGHT: 162 LBS | HEIGHT: 66 IN | SYSTOLIC BLOOD PRESSURE: 124 MMHG | TEMPERATURE: 98.3 F | RESPIRATION RATE: 18 BRPM | HEART RATE: 88 BPM | DIASTOLIC BLOOD PRESSURE: 85 MMHG

## 2019-06-19 DIAGNOSIS — S89.92XA KNEE INJURY, LEFT, INITIAL ENCOUNTER: Primary | ICD-10-CM

## 2019-06-19 PROCEDURE — 73562 X-RAY EXAM OF KNEE 3: CPT

## 2019-06-19 PROCEDURE — 99283 EMERGENCY DEPT VISIT LOW MDM: CPT

## 2019-06-21 ENCOUNTER — TRANSCRIBE ORDERS (OUTPATIENT)
Dept: ADMINISTRATIVE | Facility: HOSPITAL | Age: 29
End: 2019-06-21

## 2019-06-21 DIAGNOSIS — M25.562 LEFT KNEE PAIN, UNSPECIFIED CHRONICITY: Primary | ICD-10-CM

## 2019-06-25 ENCOUNTER — HOSPITAL ENCOUNTER (OUTPATIENT)
Dept: MRI IMAGING | Facility: HOSPITAL | Age: 29
Discharge: HOME | End: 2019-06-25
Admitting: FAMILY MEDICINE

## 2019-06-25 DIAGNOSIS — M25.562 LEFT KNEE PAIN, UNSPECIFIED CHRONICITY: ICD-10-CM

## 2019-06-25 PROCEDURE — 73721 MRI JNT OF LWR EXTRE W/O DYE: CPT

## 2019-07-03 ENCOUNTER — OFFICE VISIT (OUTPATIENT)
Dept: ORTHOPEDIC SURGERY | Facility: CLINIC | Age: 29
End: 2019-07-03

## 2019-07-03 VITALS — HEIGHT: 66 IN | RESPIRATION RATE: 18 BRPM | BODY MASS INDEX: 26.68 KG/M2 | WEIGHT: 166 LBS

## 2019-07-03 DIAGNOSIS — S83.412A SPRAIN OF MEDIAL COLLATERAL LIGAMENT OF LEFT KNEE, INITIAL ENCOUNTER: Primary | ICD-10-CM

## 2019-07-03 DIAGNOSIS — M94.262 CHONDROMALACIA OF LEFT KNEE: ICD-10-CM

## 2019-07-03 PROCEDURE — 99203 OFFICE O/P NEW LOW 30 MIN: CPT | Performed by: ORTHOPAEDIC SURGERY

## 2019-07-03 NOTE — PROGRESS NOTES
Subjective   Patient ID: Zeenat Murrieta is a 28 y.o. female  Pain and Work Related Injury of the Left Knee (Patient reports 0/10 left knee pain that began after she injured her knee at work on 6/19/19. She reports tripping over a floor mat. Patient states she heard and felt a popping sensation in her knee. 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  She does have history of prior left knee arthroscopy age 15 for removal of loose cartilage pieces from the lateral side of her left knee, denies any locking buckling giving way of the left knee prior to the slip and fall at work.  Was given a prescription for steroids which really helped her swelling, has medical history positive for heroin use opioid addiction depression is currently in a program to regain her license for nursing in [place].      Review of Systems   Constitutional: Negative for diaphoresis, fever and unexpected weight change.   HENT: Negative for dental problem and sore throat.    Eyes: Negative for visual disturbance.   Respiratory: Negative for shortness of breath.    Cardiovascular: Negative for chest pain.   Gastrointestinal: Negative for abdominal pain, constipation, diarrhea, nausea and vomiting.   Genitourinary: Negative for difficulty urinating and frequency.   Neurological: Negative for headaches.   Hematological: Does not bruise/bleed easily.   All other systems reviewed and are negative.      Past Medical History:   Diagnosis Date   • Abnormal Pap  "smear of cervix    • Anxiety    • Heroin overdose (CMS/HCC)    • HPV in female    • Major depressive disorder    • Suicide attempt (CMS/HCC)         Past Surgical History:   Procedure Laterality Date   • CERCLAGE CERVIX     • CERVICAL BIOPSY  W/ LOOP ELECTRODE EXCISION     • D&C HYSTEROSCOPY     • KNEE ARTHROSCOPY Left 2005    [facility]    • SALPINGECTOMY         Family History   Problem Relation Age of Onset   • Depression Mother    • Anxiety disorder Mother    • Alcohol abuse Mother    • Schizophrenia Sister    • Bipolar disorder Sister    • Anxiety disorder Sister        Social History     Socioeconomic History   • Marital status: Single     Spouse name: Not on file   • Number of children: Not on file   • Years of education: Not on file   • Highest education level: Not on file   Tobacco Use   • Smoking status: Current Every Day Smoker     Packs/day: 0.50     Years: 14.00     Pack years: 7.00   • Smokeless tobacco: Never Used   Substance and Sexual Activity   • Alcohol use: No     Frequency: Never     Comment: 1 pint of liquor weekly/ previously,sober 63 days   • Drug use: Yes     Types: Methamphetamines, Heroin     Comment: Sober 8 months   • Sexual activity: Yes     Partners: Male       I have reviewed all of the above social hx, family hx, surgical hx, medications, allergies & ROS and confirm that it is accurate.    Allergies   Allergen Reactions   • Atarax [Hydroxyzine] GI Intolerance         Current Outpatient Medications:   •  hydrOXYzine (VISTARIL) 25 MG capsule, Take 1 capsule by mouth Every 4 (Four) Hours As Needed for Anxiety., Disp: 120 capsule, Rfl: 1  •  ibuprofen (ADVIL,MOTRIN) 400 MG tablet, Take 400 mg by mouth 3 (Three) Times a Day., Disp: , Rfl:   •  traZODone (DESYREL) 100 MG tablet, Take 1 tablet by mouth At Night As Needed for Sleep., Disp: 30 tablet, Rfl: 1    Objective   Resp 18   Ht 167.6 cm (66\")   Wt 75.3 kg (166 lb)   BMI 26.79 kg/m²    Physical " Exam  Constitutional: Patient is oriented to person, place, and time. Patient appears well-developed and well-nourished.   HENT:Head: Normocephalic and atraumatic.   Eyes: EOM are normal. Pupils are equal, round, and reactive to light.   Neck: Normal range of motion. Neck supple.   Cardiovascular: Normal rate.    Pulmonary/Chest: Effort normal and breath sounds normal.   Abdominal: Soft.   Neurological: Patient is alert and oriented to person, place, and time.   Skin: Skin is warm and dry.   Psychiatric: Patient has a normal mood and affect.   Nursing note and vitals reviewed.       [unfilled]   Left knee: No effusion full extension minimal medial tenderness over the MCL origin but no ecchymosis noted no instability with valgus stress, Lachman sign negative, some tenderness over the anterolateral joint line but very minimal no palpable loose bodies full extension full flexion she is able to do a full squat without pain or instability and has a negative Lockman sign.  Calf is supple neurovascularly intact no ecchymosis seen on the lateral side of her patellofemoral area.    Assessment/Plan   Review of Radiographic Studies:    No new imaging done today.  Prior radiographs 6/19/2019 confirmed what appears to be chronic flattening of the lateral femoral condyle consistent with her history of prior arthroscopic treatment of age 15, MR study 625 images were directly examined confirming a very tiny loose chondral fragment at the anterolateral compartment which appears to be somewhat chronic in nature.  Also noted medial facet bruising of the patella with no sign of clear fracture.      Procedures     Zeenat was seen today for pain and work related injury.    Diagnoses and all orders for this visit:    Sprain of medial collateral ligament of left knee, initial encounter    Chondromalacia of left knee       Use brace as instructed and Work status form completed and provided to patient      Recommendations/Plan:    Work/Activity Status: May perform usual activities as tolerated    Patient agreeable to call or return sooner for any concerns.         Demonstrated the MR findings and x-ray findings to the patient explained with models and diagrams the nature of her condition treatment options recommendations at this time for nonsurgical care bracing icing use of Tylenol or ibuprofen and recheck in 6 weeks time.  Patient does understand indications for future surgical care if necessary    Impression:  History of left knee arthroscopic debridement chondral injury age 15 now with work-related acute left knee sprain medial facet patellar contusion, slight grade 1 MCL origin sprain without instability, very tiny loose fragment chondral lesion anterolateral femoral condyle probably old in nature  Plan:  Icing Tylenol bracing recheck 6 weeks nonsurgical care advised at this time

## 2019-08-13 ENCOUNTER — OFFICE VISIT (OUTPATIENT)
Dept: ORTHOPEDIC SURGERY | Facility: CLINIC | Age: 29
End: 2019-08-13

## 2019-08-13 VITALS — HEIGHT: 66 IN | BODY MASS INDEX: 26.68 KG/M2 | RESPIRATION RATE: 18 BRPM | WEIGHT: 166 LBS

## 2019-08-13 DIAGNOSIS — S83.412D SPRAIN OF MEDIAL COLLATERAL LIGAMENT OF LEFT KNEE, SUBSEQUENT ENCOUNTER: Primary | ICD-10-CM

## 2019-08-13 PROCEDURE — 99213 OFFICE O/P EST LOW 20 MIN: CPT | Performed by: ORTHOPAEDIC SURGERY

## 2019-08-13 NOTE — PROGRESS NOTES
Subjective   Patient ID: Zeenat Murrieta is a 28 y.o. female  Follow-up of the Left Leg (Patient is here today for a follow up on her left leg and denies any pain or concerns.)             History of Present Illness  Left knee pain nearly resolved much less swelling able to do her work activities at times she does get an anterior knee pain but she feels comfortable with it does not feel the need for medications it very rarely swells overall she is much improved from her prior visit.      Review of Systems   Constitutional: Negative for fever.   HENT: Negative for voice change.    Eyes: Negative for visual disturbance.   Respiratory: Negative for shortness of breath.    Cardiovascular: Negative for chest pain.   Gastrointestinal: Negative for abdominal pain.   Genitourinary: Negative for dysuria.   Musculoskeletal: Negative for arthralgias, gait problem and joint swelling.   Skin: Negative for rash.   Neurological: Negative for speech difficulty.   Hematological: Does not bruise/bleed easily.   Psychiatric/Behavioral: Negative for confusion.       Past Medical History:   Diagnosis Date   • Abnormal Pap smear of cervix    • Anxiety    • Heroin overdose (CMS/[facility])    • HPV in female    • Major depressive disorder    • Suicide attempt (CMS/[facility])         Past Surgical History:   Procedure Laterality Date   • CERCLAGE CERVIX     • CERVICAL BIOPSY  W/ LOOP ELECTRODE EXCISION     • D&C HYSTEROSCOPY     • KNEE ARTHROSCOPY Left 2005    [facility]    • SALPINGECTOMY         Family History   Problem Relation Age of Onset   • Depression Mother    • Anxiety disorder Mother    • Alcohol abuse Mother    • Schizophrenia Sister    • Bipolar disorder Sister    • Anxiety disorder Sister        Social History     Socioeconomic History   • Marital status: Single     Spouse name: Not on file   • Number of children: Not on file   • Years of education: Not on file   • Highest education level: Not on file  "  Tobacco Use   • Smoking status: Current Every Day Smoker     Packs/day: 0.50     Years: 14.00     Pack years: 7.00   • Smokeless tobacco: Never Used   Substance and Sexual Activity   • Alcohol use: No     Frequency: Never     Comment: 1 pint of liquor weekly/ previously,sober 63 days   • Drug use: Yes     Types: Methamphetamines, Heroin     Comment: Sober 8 months   • Sexual activity: Yes     Partners: Male       I have reviewed all of the above social hx, family hx, surgical hx, medications, allergies & ROS and confirm that it is accurate.    Allergies   Allergen Reactions   • Atarax [Hydroxyzine] GI Intolerance         Current Outpatient Medications:   •  hydrOXYzine (VISTARIL) 25 MG capsule, Take 1 capsule by mouth Every 4 (Four) Hours As Needed for Anxiety., Disp: 120 capsule, Rfl: 1  •  ibuprofen (ADVIL,MOTRIN) 400 MG tablet, Take 400 mg by mouth 3 (Three) Times a Day., Disp: , Rfl:   •  traZODone (DESYREL) 100 MG tablet, Take 1 tablet by mouth At Night As Needed for Sleep., Disp: 30 tablet, Rfl: 1    Objective   Resp 18   Ht 167.6 cm (66\")   Wt 75.3 kg (166 lb)   BMI 26.79 kg/m²    Physical Exam  Constitutional: Patient is oriented to person, place, and time. Patient appears well-developed and well-nourished.   HENT:Head: Normocephalic and atraumatic.   Eyes: EOM are normal. Pupils are equal, round, and reactive to light.   Neck: Normal range of motion. Neck supple.   Cardiovascular: Normal rate.    Pulmonary/Chest: Effort normal and breath sounds normal.   Abdominal: Soft.   Neurological: Patient is alert and oriented to person, place, and time.   Skin: Skin is warm and dry.   Psychiatric: Patient has a normal mood and affect.   Nursing note and vitals reviewed.       [unfilled]   Left knee: No effusion full extension minimal patellofemoral crepitus negative patellofemoral apprehension sign negative Lb sign negative Lockman sign no pain or instability with varus valgus stress calf supple " neurovascularly intact.    Assessment/Plan   Review of Radiographic Studies:    No new imaging done today.      Procedures     Zeenat was seen today for follow-up.    Diagnoses and all orders for this visit:    Sprain of medial collateral ligament of left knee, subsequent encounter       Orthopedic activities reviewed and patient expressed appreciation and Risk, benefits, and merits of treatment alternatives reviewed with the patient and questions answered      Recommendations/Plan:   Work/Activity Status: May perform usual activities as tolerated    Patient agreeable to call or return sooner for any concerns.             Impression:  Resolving left anterior knee pain medial patellofemoral retinacular strain  Plan:  Home exercise icing she will call for referral to therapy if pain continues

## 2019-10-02 ENCOUNTER — OFFICE VISIT (OUTPATIENT)
Dept: OBSTETRICS AND GYNECOLOGY | Facility: CLINIC | Age: 29
End: 2019-10-02

## 2019-10-02 VITALS
HEIGHT: 66 IN | DIASTOLIC BLOOD PRESSURE: 64 MMHG | SYSTOLIC BLOOD PRESSURE: 118 MMHG | BODY MASS INDEX: 26.68 KG/M2 | WEIGHT: 166 LBS

## 2019-10-02 DIAGNOSIS — N63.20 LEFT BREAST LUMP: Primary | ICD-10-CM

## 2019-10-02 PROCEDURE — 99213 OFFICE O/P EST LOW 20 MIN: CPT | Performed by: OBSTETRICS & GYNECOLOGY

## 2019-10-02 NOTE — PROGRESS NOTES
Subjective   Chief Complaint   Patient presents with   • Breast Mass     pt found breast lump on left breast last month      Zeenat Murrieta is a 29 y.o. year old .  Patient's last menstrual period was 2019.  She presents to be seen because of left breast lump that hasn't changed of any measure over the past month. No h/o breast lumps/masses. .   Both GM's had Br Ca.     OTHER COMPLAINTS:  Nothing else    The following portions of the patient's history were reviewed and updated as appropriate:  She  has a past medical history of Abnormal Pap smear of cervix, Anxiety, Heroin overdose (CMS/[facility]), HPV in female, Major depressive disorder, and Suicide attempt (CMS/[facility]).  She does not have any pertinent problems on file.  She  has a past surgical history that includes Cerclage cervix; d&c hysteroscopy; Cervical biopsy w/ loop electrode excision; Salpingectomy; and Knee arthroscopy (Left, ).  Her family history includes Alcohol abuse in her mother; Anxiety disorder in her mother and sister; Bipolar disorder in her sister; Depression in her mother; Schizophrenia in her sister.  She  reports that she has been smoking.  She has a 7.00 pack-year smoking history. She has never used smokeless tobacco. She reports that she uses drugs. Drugs: Methamphetamines and Heroin. She reports that she does not drink alcohol.  Current Outpatient Medications   Medication Sig Dispense Refill   • hydrOXYzine (VISTARIL) 25 MG capsule Take 1 capsule by mouth Every 4 (Four) Hours As Needed for Anxiety. 120 capsule 1   • ibuprofen (ADVIL,MOTRIN) 400 MG tablet Take 400 mg by mouth 3 (Three) Times a Day.     • traZODone (DESYREL) 100 MG tablet Take 1 tablet by mouth At Night As Needed for Sleep. 30 tablet 1     No current facility-administered medications for this visit.      Current Outpatient Medications on File Prior to Visit   Medication Sig   • hydrOXYzine (VISTARIL) 25 MG capsule Take 1 capsule by mouth Every 4 (Four) Hours As  "Needed for Anxiety.   • ibuprofen (ADVIL,MOTRIN) 400 MG tablet Take 400 mg by mouth 3 (Three) Times a Day.   • traZODone (DESYREL) 100 MG tablet Take 1 tablet by mouth At Night As Needed for Sleep.     No current facility-administered medications on file prior to visit.      She is allergic to atarax [hydroxyzine].    Social History    Tobacco Use      Smoking status: Current Every Day Smoker        Packs/day: 0.50        Years: 14.00        Pack years: 7      Smokeless tobacco: Never Used    Review of Systems  Consitutional POS: nothing reported    NEG: anorexia or night sweats   Gastointestinal POS: nothing reported    NEG: bloating, change in bowel habits, melena or reflux symptoms   Genitourinary POS: nothing reported    NEG: dysuria or hematuria   Integument POS: nothing reported    NEG: moles that are changing in size, shape, color or rashes   Breast POS: nothing reported    NEG: persistent breast lump, skin dimpling or nipple discharge         Respiratory: negative  Cardiovascular: negative          Objective   /64   Ht 167.6 cm (66\")   Wt 75.3 kg (166 lb)   LMP 09/30/2019   BMI 26.79 kg/m²     General:  well developed; well nourished  no acute distress   Skin:  No suspicious lesions seen   Thyroid: not examined   Lungs:  breathing is unlabored   Heart:  Not performed.   Breasts:  Examined in supine position  Symmetric without masses or skin dimpling  Nipples normal without inversion, lesions or discharge  There are no palpable axillary nodes   Abdomen: soft, non-tender; no masses  no umbilical or inguinal hernias are present  no hepato-splenomegaly   Pelvis: Not performed.     Psychiatric: Alert and oriented ×3, mood and affect appropriate  HEENT: Atraumatic, normocephalic, normal scleral icterus  Extremities: 2+ pulses bilaterally, no edema      Lab Review   No data reviewed    Imaging   No data reviewed        Assessment   1. Patient complained of left breast lump medial 2 cm or so from the " areole region.  There is no palpable mass noted today.  No skin changes.  No lymphadenopathy.  Most likely fibrocystic changes     Plan   1. We will left breast ultrasound for reassurance.  Continue monthly self breast exams.  2.     No orders of the defined types were placed in this encounter.         This note was electronically signed.      October 2, 2019

## 2019-10-08 ENCOUNTER — HOSPITAL ENCOUNTER (OUTPATIENT)
Dept: ULTRASOUND IMAGING | Facility: HOSPITAL | Age: 29
End: 2019-10-08

## 2019-10-15 ENCOUNTER — HOSPITAL ENCOUNTER (OUTPATIENT)
Dept: ULTRASOUND IMAGING | Facility: HOSPITAL | Age: 29
Discharge: HOME | End: 2019-10-15
Admitting: OBSTETRICS & GYNECOLOGY

## 2019-10-15 DIAGNOSIS — N63.20 LEFT BREAST LUMP: ICD-10-CM

## 2019-10-15 PROCEDURE — 76641 ULTRASOUND BREAST COMPLETE: CPT

## 2019-10-30 ENCOUNTER — OFFICE VISIT (OUTPATIENT)
Dept: INTERNAL MEDICINE | Facility: CLINIC | Age: 29
End: 2019-10-30

## 2019-10-30 VITALS
TEMPERATURE: 98.3 F | HEIGHT: 66 IN | WEIGHT: 168 LBS | OXYGEN SATURATION: 100 % | BODY MASS INDEX: 27 KG/M2 | SYSTOLIC BLOOD PRESSURE: 120 MMHG | DIASTOLIC BLOOD PRESSURE: 74 MMHG | HEART RATE: 78 BPM

## 2019-10-30 DIAGNOSIS — G47.9 SLEEPING DIFFICULTIES: ICD-10-CM

## 2019-10-30 DIAGNOSIS — F33.0 MILD EPISODE OF RECURRENT MAJOR DEPRESSIVE DISORDER (HCC): Primary | ICD-10-CM

## 2019-10-30 DIAGNOSIS — F19.11 HISTORY OF SUBSTANCE ABUSE (HCC): ICD-10-CM

## 2019-10-30 PROCEDURE — 99214 OFFICE O/P EST MOD 30 MIN: CPT | Performed by: NURSE PRACTITIONER

## 2019-10-30 RX ORDER — SERTRALINE HYDROCHLORIDE 25 MG/1
25 TABLET, FILM COATED ORAL DAILY
Qty: 30 TABLET | Refills: 1 | Status: SHIPPED | OUTPATIENT
Start: 2019-10-30 | End: 2019-11-27 | Stop reason: SDUPTHER

## 2019-10-31 ENCOUNTER — TELEPHONE (OUTPATIENT)
Dept: INTERNAL MEDICINE | Facility: CLINIC | Age: 29
End: 2019-10-31

## 2019-10-31 NOTE — TELEPHONE ENCOUNTER
Patient called stating that there was a issue with the paperwork from yesterday and it was not filled out correctly. She'd like a callback when possible to discuss.

## 2019-11-27 ENCOUNTER — OFFICE VISIT (OUTPATIENT)
Dept: INTERNAL MEDICINE | Facility: CLINIC | Age: 29
End: 2019-11-27

## 2019-11-27 VITALS
HEART RATE: 98 BPM | WEIGHT: 165.12 LBS | DIASTOLIC BLOOD PRESSURE: 74 MMHG | BODY MASS INDEX: 26.54 KG/M2 | TEMPERATURE: 98.4 F | SYSTOLIC BLOOD PRESSURE: 110 MMHG | HEIGHT: 66 IN | OXYGEN SATURATION: 97 %

## 2019-11-27 DIAGNOSIS — Z23 NEED FOR INFLUENZA VACCINATION: ICD-10-CM

## 2019-11-27 DIAGNOSIS — F33.0 MILD EPISODE OF RECURRENT MAJOR DEPRESSIVE DISORDER (HCC): Primary | ICD-10-CM

## 2019-11-27 PROCEDURE — 99213 OFFICE O/P EST LOW 20 MIN: CPT | Performed by: NURSE PRACTITIONER

## 2019-11-27 PROCEDURE — 90471 IMMUNIZATION ADMIN: CPT | Performed by: NURSE PRACTITIONER

## 2019-11-27 PROCEDURE — 90674 CCIIV4 VAC NO PRSV 0.5 ML IM: CPT | Performed by: NURSE PRACTITIONER

## 2019-11-27 RX ORDER — SERTRALINE HYDROCHLORIDE 25 MG/1
25 TABLET, FILM COATED ORAL DAILY
Qty: 90 TABLET | Refills: 1 | Status: SHIPPED | OUTPATIENT
Start: 2019-11-27 | End: 2021-01-20

## 2019-12-15 NOTE — PROGRESS NOTES
Chief Complaint / Reason:      Chief Complaint   Patient presents with   • Follow-up     1 month f/u r/t depression       Subjective     HPI  Patient presents today for one-month follow-up regarding depression.  Denies SI or HI.  She would like to discuss continuing Zoloft as she is doing okay and denies any side effects.  Patient does have a history of depression and anxiety and abuse history of substance abuse but she states that she has been doing well and does not want to get back in a deep depression.  History taken from: patient    PMH/FH/Social History were reviewed and updated appropriately in the electronic medical record.   Past Medical History:   Diagnosis Date   • Abnormal Pap smear of cervix    • Anxiety    • Heroin overdose (CMS/HCC)    • HPV in female    • Major depressive disorder    • Suicide attempt (CMS/[facility])      Past Surgical History:   Procedure Laterality Date   • CERCLAGE CERVIX     • CERVICAL BIOPSY  W/ LOOP ELECTRODE EXCISION     • D&C HYSTEROSCOPY     • KNEE ARTHROSCOPY Left 2005    [facility]    • SALPINGECTOMY       Social History     Socioeconomic History   • Marital status: Single     Spouse name: Not on file   • Number of children: Not on file   • Years of education: Not on file   • Highest education level: Not on file   Tobacco Use   • Smoking status: Current Every Day Smoker     Packs/day: 0.50     Years: 14.00     Pack years: 7.00     Types: Cigarettes   • Smokeless tobacco: Never Used   Substance and Sexual Activity   • Alcohol use: No     Frequency: Never     Comment: 1 pint of liquor weekly/ previously,sober 63 days   • Drug use: Yes     Types: Methamphetamines, Heroin     Comment: Sober 8 months   • Sexual activity: Yes     Partners: Male     Family History   Problem Relation Age of Onset   • Depression Mother    • Anxiety disorder Mother    • Alcohol abuse Mother    • Schizophrenia Sister    • Bipolar disorder Sister    • Anxiety disorder Sister         Review of Systems:   Review of Systems   Constitutional: Negative.    Respiratory: Negative.    Cardiovascular: Negative.    Neurological: Negative.    Psychiatric/Behavioral: Negative.          All other systems were reviewed and are negative.  Exceptions are noted in the subjective or above.      Objective     Vital Signs  Vitals:    11/27/19 1403   BP: 110/74   Pulse: 98   Temp: 98.4 °F (36.9 °C)   SpO2: 97%       Body mass index is 26.65 kg/m².    Physical Exam   Constitutional: She is oriented to person, place, and time. She appears well-developed and well-nourished.   Cardiovascular: Normal rate, regular rhythm, normal heart sounds and intact distal pulses.   Pulmonary/Chest: Effort normal and breath sounds normal.   Abdominal: Soft. Bowel sounds are normal.   Musculoskeletal: Normal range of motion.   Neurological: She is alert and oriented to person, place, and time. Coordination normal.   Skin: Skin is warm and dry. Capillary refill takes less than 2 seconds.   Psychiatric: She has a normal mood and affect. Her behavior is normal. Judgment and thought content normal.   Nursing note and vitals reviewed.           Results Review:    I reviewed the patient's previous clinical results.       Medication Review:     Current Outpatient Medications:   •  sertraline (ZOLOFT) 25 MG tablet, Take 1 tablet by mouth Daily., Disp: 90 tablet, Rfl: 1  •  traZODone (DESYREL) 100 MG tablet, Take 1 tablet by mouth At Night As Needed for Sleep., Disp: 30 tablet, Rfl: 1  •  cephalexin (KEFLEX) 500 MG capsule, Take 1 capsule by mouth 3 (Three) Times a Day., Disp: 21 capsule, Rfl: 0  •  mupirocin (BACTROBAN) 2 % ointment, Apply to affected area bid-tid, Disp: 30 g, Rfl: 0    Assessment/Plan   Zeenat was seen today for follow-up.    Diagnoses and all orders for this visit:    Mild episode of recurrent major depressive disorder (CMS/HCC)  -     sertraline (ZOLOFT) 25 MG tablet; Take 1 tablet by mouth Daily.  Continue current  medication regimen, stable on medications without worsening s/s     Need for influenza vaccination  -     Flucelvax Quad=>4Years (PFS)        Return in about 3 months (around 2/27/2020), or if symptoms worsen or fail to improve.    Melissa Kuhn, APRN  11/27/2019

## 2020-02-06 ENCOUNTER — OFFICE VISIT (OUTPATIENT)
Dept: PSYCHIATRY | Facility: CLINIC | Age: 30
End: 2020-02-06

## 2020-02-06 DIAGNOSIS — F60.3 BORDERLINE PERSONALITY DISORDER (HCC): Primary | ICD-10-CM

## 2020-02-06 PROCEDURE — 90791 PSYCH DIAGNOSTIC EVALUATION: CPT | Performed by: COUNSELOR
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
## 2020-02-19 ENCOUNTER — OFFICE VISIT (OUTPATIENT)
Dept: PSYCHIATRY | Facility: CLINIC | Age: 30
End: 2020-02-19

## 2020-02-19 DIAGNOSIS — F33.2 SEVERE EPISODE OF RECURRENT MAJOR DEPRESSIVE DISORDER, WITHOUT PSYCHOTIC FEATURES (HCC): ICD-10-CM

## 2020-02-19 DIAGNOSIS — F60.3 BORDERLINE PERSONALITY DISORDER (HCC): Primary | ICD-10-CM

## 2020-02-19 PROCEDURE — 90837 PSYTX W PT 60 MINUTES: CPT | Performed by: COUNSELOR

## 2020-02-19 NOTE — PROGRESS NOTES
Date of Service: February 19, 2020  Time In: 10:30 AM  Time Out: 11:30 AM      PROGRESS NOTE  VISIT DIAGNOSIS:     ICD-10-CM ICD-9-CM   1. Borderline personality disorder (CMS/[facility]) F60.3 301.83   2. Severe episode of recurrent major depressive disorder, without psychotic features (CMS/HCC) F33.2 296.33        Data:  Zeenat Murrieta is a 29 y.o. female who presents for individual therapy session at [facility].  Patient reports she went to court last week and everything went well.  She is hopeful that the criminal case against her from a few years ago will be dropped.  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  Patient reports being irritable and snappy and interactions with her boyfriend.  She acknowledged that she has been taking her stress out on him.  She sees herself as a failure in all aspects of life.  She has withdrawn from many social relationships.  Patient states she goes to work, AA meetings, and comes home.  She does not trust anyone and does not want to make friends.  Reports that she cannot stand to look at herself in the mirror and will periodically put sheets over all the mirrors in her home.       Patient adamantly and convincingly denies current suicidal or homicidal ideation or perceptual disturbance.    Clinical Maneuvering/Intervention:  Assisted patient in processing above session content; acknowledged and normalized patient’s thoughts, feelings, and concerns.  Distorted cognitive messages were noted to contribute to the patient's feelings of depression.  It was noted that the patient demonstrated sad affect and tearfulness when describing her feelings.  Empathy was expressed in regard to her sense of depression, rapport was established.  Encourage the use of mindfulness every day over the next week until her next appointment.  Provided her with resources to review mindfulness and to  practice.    Allowed patient to freely discuss issues without interruption or judgment. Provided safe, confidential environment to facilitate the development of positive therapeutic relationship and encourage open, honest communication. Assisted patient in identifying risk factors which would indicate the need for higher level of care including thoughts to harm self or others and/or self-harming behavior and encouraged patient to contact this office, call 911, or present to the nearest emergency room should any of these events occur. Discussed crisis intervention services and means to access.        Mental Status Exam  Hygiene:  good  Dress:  casual  Attitude:  Cooperative  Motor Activity:  Appropriate  Speech:  Normal  Mood:  sad  Affect:  depressed  Thought Processes:  Goal directed  Thought Content:  normal  Suicidal Thoughts:  denies  Homicidal Thoughts:  denies  Crisis Safety Plan: yes, to come to the emergency room.  Hallucinations:  denies  Functional Status: No impairment    Progress toward goal: Not at goal    Plan     Patient will continue in individual outpatient therapy with focus on improved functioning and coping skills. Clinical maneuvering will consist of, but not limited to, Cognitive Behavioral Therapy and Solution Focused Therapy to improve functioning, maintain stability, and avoid decompensation and the need for higher level of care.     Patient will adhere to medication regimen as prescribed and report any side effects. Patient will contact this office, call 911 or present to the nearest emergency room should suicidal or homicidal ideations occur.     Return in about 1 week, or earlier if symptoms worsen or fail to improve.         VISIT DIAGNOSIS:     ICD-10-CM ICD-9-CM   1. Borderline personality disorder (CMS/[facility]) F60.3 301.83   2. Severe episode of recurrent major depressive disorder, without psychotic features (CMS/[facility]) F33.2 296.33        Janet Wallis [facility]      Please note that  portions of this note were completed with a voice recognition program. Efforts were made to edit dictation, but occasionally words are mistranscribed.

## 2020-04-20 ENCOUNTER — TELEMEDICINE (OUTPATIENT)
Dept: PSYCHIATRY | Facility: CLINIC | Age: 30
End: 2020-04-20

## 2020-04-20 DIAGNOSIS — F11.21 OPIOID DEPENDENCE IN REMISSION (HCC): ICD-10-CM

## 2020-04-20 DIAGNOSIS — F33.2 SEVERE EPISODE OF RECURRENT MAJOR DEPRESSIVE DISORDER, WITHOUT PSYCHOTIC FEATURES (HCC): ICD-10-CM

## 2020-04-20 DIAGNOSIS — F60.3 BORDERLINE PERSONALITY DISORDER (HCC): Primary | ICD-10-CM

## 2020-04-20 PROCEDURE — 90837 PSYTX W PT 60 MINUTES: CPT | Performed by: COUNSELOR

## 2020-04-20 NOTE — PROGRESS NOTES
".Date of Service: April 20, 2020  Time In: 8:30 AM  Time Out: 9:30 AM      PROGRESS NOTE  VISIT DIAGNOSIS:     ICD-10-CM ICD-9-CM   1. Borderline personality disorder (CMS/HCC) F60.3 301.83   2. Severe episode of recurrent major depressive disorder, without psychotic features (CMS/HCC) F33.2 296.33   3. Opioid dependence in remission (CMS/[facility]) F11.21 304.03        Data:  Zeenat Murrieta is a 29 y.o. female who presents for individual therapy session at [facility]. This was an audio and video enabled telemedicine encounter. Patient stated that she is in a secure environment for this session. Patient discussed feelings of depression increasing as a result of more free time. She is not working right now due to COVID. She was scheduled to return to court this month on charges from two years ago, but court has been postponed. She discussed feelings of frustration because all her stressors are out of her control. She has diminished energy and sadness. Patient reported triggers and cravings for alcohol. She sees friends online have \"quarantinis\" and other mixed drinks and she thinks \"it would be nice to have a drink\". Patient realizes that she cannot socially drink in moderation. She denies using alcohol or other illicit drugs.     Patient adamantly and convincingly denies current suicidal or homicidal ideation or perceptual disturbance.    Clinical Maneuvering/Intervention:  Assisted patient in processing above session content; acknowledged and normalized patient’s thoughts, feelings, and concerns. Patient was engaged in an exercise of radical acceptance using DBT. We discussed how to increase her ability to tolerate distress starts with a change in her attitude. When patient is in pain, her first reaction is to get angry or upset and to blame someone for causing the pain the first place. But unfortunately, no matter who she blames for COVID19, the pain will exist and she will continue to suffer these " helpless feelings.     Assisted patient in identifying risk factors which would indicate the need for higher level of care including thoughts to harm self or others and/or self-harming behavior and encouraged patient to contact this office, call 911, or present to the nearest emergency room should any of these events occur. Discussed crisis intervention services and means to access.        Mental Status Exam  Hygiene:  good  Dress:  casual  Attitude:  Cooperative  Motor Activity:  Appropriate  Speech:  Normal  Mood:  irritable  Affect:  anxious  Thought Processes:  Goal directed  Thought Content:  normal  Suicidal Thoughts:  denies  Homicidal Thoughts:  denies  Crisis Safety Plan: yes, to come to the emergency room.  Hallucinations:  denies  Functional Status: No impairment    Progress toward goal: Not at goal    Plan     Patient will continue in individual outpatient therapy with focus on improved functioning and coping skills. Clinical maneuvering will consist of, but not limited to, Cognitive Behavioral Therapy and Solution Focused Therapy to improve functioning, maintain stability, and avoid decompensation and the need for higher level of care.     Patient will adhere to medication regimen as prescribed and report any side effects. Patient will contact this office, call 911 or present to the nearest emergency room should suicidal or homicidal ideations occur.     Return in about 2 weeks, or earlier if symptoms worsen or fail to improve.    Reiterated our 24-hour cancellation/no show notice preference         VISIT DIAGNOSIS:     ICD-10-CM ICD-9-CM   1. Borderline personality disorder (CMS/[facility]) F60.3 301.83   2. Severe episode of recurrent major depressive disorder, without psychotic features (CMS/[facility]) F33.2 296.33   3. Opioid dependence in remission (CMS/[facility]) F11.21 304.03        Janet Wallis [facility]      Please note that portions of this note were completed with a voice recognition program. Efforts were made to  edit dictation, but occasionally words are mistranscribed.

## 2020-05-04 ENCOUNTER — TELEMEDICINE (OUTPATIENT)
Dept: PSYCHIATRY | Facility: CLINIC | Age: 30
End: 2020-05-04

## 2020-05-04 DIAGNOSIS — F11.21 OPIOID DEPENDENCE IN REMISSION (HCC): ICD-10-CM

## 2020-05-04 DIAGNOSIS — F60.3 BORDERLINE PERSONALITY DISORDER (HCC): Primary | ICD-10-CM

## 2020-05-04 DIAGNOSIS — F33.2 SEVERE EPISODE OF RECURRENT MAJOR DEPRESSIVE DISORDER, WITHOUT PSYCHOTIC FEATURES (HCC): ICD-10-CM

## 2020-05-04 PROCEDURE — 90832 PSYTX W PT 30 MINUTES: CPT | Performed by: COUNSELOR

## 2020-05-04 NOTE — PROGRESS NOTES
"Date of Service: May 4, 2020  Time In: 12:30 PM  Time Out: 1:00 PM      PROGRESS NOTE  VISIT DIAGNOSIS:     ICD-10-CM ICD-9-CM   1. Borderline personality disorder (CMS/[facility]) F60.3 301.83   2. Severe episode of recurrent major depressive disorder, without psychotic features (CMS/HCC) F33.2 296.33   3. Opioid dependence in remission (CMS/[facility]) F11.21 304.03        Data:  Zeenat Murrieta is a 29 y.o. female who presents for individual therapy session at [facility]. This was an audio and video enabled telemedicine encounter. Patient stated that she is in a secure environment for this session. Patient's legal hearing is tomorrow. 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 To distract herself from the stress, she began cleaning her daughter's room. She found boxes of old clothes and various items that reminded her of when her daughter was a baby. During this time, patient was heavy in her addiction to opiates and does not feel that she bonded with her daughter. She reported having a \"rough time\" looking through the boxes, tearfulness, high anxiety.      Patient adamantly and convincingly denies current suicidal or homicidal ideation or perceptual disturbance.    Clinical Maneuvering/Intervention:  Assisted patient in processing above session content; acknowledged and normalized patient’s thoughts, feelings, and concerns. Assisted the patient in identifying and challenging her negative self-talk. She was provided with corrective feedback to improve her attempts at challenging biased self-talk. Encouraged her to reach out to her support systems instead of isolating.     Allowed patient to freely discuss issues without interruption or judgment. Provided safe, confidential environment to facilitate the development of positive therapeutic relationship and encourage open, honest communication. Assisted patient in " identifying risk factors which would indicate the need for higher level of care including thoughts to harm self or others and/or self-harming behavior and encouraged patient to contact this office, call 911, or present to the nearest emergency room should any of these events occur. Discussed crisis intervention services and means to access.        Mental Status Exam  Hygiene:  good  Dress:  casual  Attitude:  Cooperative  Motor Activity:  Appropriate  Speech:  Normal  Mood:  sad  Affect:  depressed  Thought Processes:  Goal directed  Thought Content:  normal  Suicidal Thoughts:  denies  Homicidal Thoughts:  denies  Crisis Safety Plan: yes, to come to the emergency room.  Hallucinations:  denies  Functional Status: No impairment    Progress toward goal: Not at goal    Plan     Patient will continue in individual outpatient therapy with focus on improved functioning and coping skills. Clinical maneuvering will consist of, but not limited to, Cognitive Behavioral Therapy and Solution Focused Therapy to improve functioning, maintain stability, and avoid decompensation and the need for higher level of care.     Patient will adhere to medication regimen as prescribed and report any side effects. Patient will contact this office, call 911 or present to the nearest emergency room should suicidal or homicidal ideations occur.     Return in about 1-2 weeks, or earlier if symptoms worsen or fail to improve.    Reiterated our 24-hour cancellation/no show notice preference         VISIT DIAGNOSIS:     ICD-10-CM ICD-9-CM   1. Borderline personality disorder (CMS/[facility]) F60.3 301.83   2. Severe episode of recurrent major depressive disorder, without psychotic features (CMS/HCC) F33.2 296.33   3. Opioid dependence in remission (CMS/HCC) F11.21 304.03        Janet Wallis [facility]      Please note that portions of this note were completed with a voice recognition program. Efforts were made to edit dictation, but occasionally words are  mistranscribed.

## 2020-05-13 ENCOUNTER — DOCUMENTATION (OUTPATIENT)
Dept: PSYCHIATRY | Facility: CLINIC | Age: 30
End: 2020-05-13

## 2020-05-13 NOTE — PROGRESS NOTES
.DISCHARGE SUMMARY   Name: Zeenat Murrieta   Date: May 13, 2020   Date of First Consultation: 07-   Duration of the Treatment: 3 years     Summary of the Presenting Difficulties:  Patient presented to the clinic as a self-referral for the  Chemical Dependency Intensive Outpatient Program. 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 She completed all three phases of the substance abuse treatment program and later returned for outpatient psychotherapy sessions.    Other Areas Addressed During Treatment:   Through the course of treatment, it became evident that the client was experiencing issues related to early recovery. Addressing past trauma also became a focus of our work together. Issues of emotional reactivity/anger control problems also became an important focus of the treatment.     Overview of the Treatment Process:   The client was seen on a routine basis in supportive-dynamic psychotherapy with the incorporation of cognitive-behavioral techniques to address her chief complaints. A good therapeutic alliance was easily established and maintained throughout the course of our work together. During the initial phase of treatment, the patient gained awareness regarding  psychological/emotional position within the therapeutic enviornment. Therapy provided a place within which patient could express and explore her concerns, while being available as support to family and friends.       Nature of the Termination:  The patient did experience significant improvement and gains throughout the treatment. Patient expressed the wish to terminate treatment based on feeling satisfied with her level of improvement and not wanting to be transferred to another therapist. Clinician is leaving the agency.    Gains Made/Progress:   Patient made significant gains during the course of  treatment and reported being satisfied with her progress. Zeenat remained sober from alcohol and illicit drugs. Patient was also able to use the space of therapy to explore and better clarify aspirations and the factors that had been preventing making decisions in this regard. Patient responded very well to DBT and appeared to improve her relationships with family and friends as a result     Limitations of the treatment:   None  Remaining difficulties and/or concerns:  There were no significant remaining difficulties or concerns observed or expressed by the patient at the end of treatment.   Recommendations:   Though the client made tremendous progress and met main goals during the course of treatment, in the interest of maximizing psychological well-being and self-understanding, the patient may benefit from ongoing exploratory psychodynamic/analytic therapy in the future.   Follow-up:  No specific follow-up plan is indicated; the client was informed and is free to contact me in the future if needed.   Additional Comments:  Encouraged ongoing participation in psychotropic medication management.       This document has been electronically signed by CARROLL Ware, KERRIE  May 13, 2020 16:46

## 2021-01-20 ENCOUNTER — OFFICE VISIT (OUTPATIENT)
Dept: INTERNAL MEDICINE | Facility: CLINIC | Age: 31
End: 2021-01-20

## 2021-01-20 VITALS
OXYGEN SATURATION: 99 % | WEIGHT: 162 LBS | RESPIRATION RATE: 16 BRPM | HEART RATE: 113 BPM | SYSTOLIC BLOOD PRESSURE: 149 MMHG | TEMPERATURE: 100 F | BODY MASS INDEX: 26.03 KG/M2 | DIASTOLIC BLOOD PRESSURE: 77 MMHG | HEIGHT: 66 IN

## 2021-01-20 DIAGNOSIS — Z72.0 TOBACCO ABUSE: ICD-10-CM

## 2021-01-20 DIAGNOSIS — H60.01 ABSCESS OF RIGHT EAR CANAL: Primary | ICD-10-CM

## 2021-01-20 DIAGNOSIS — G47.9 SLEEPING DIFFICULTIES: ICD-10-CM

## 2021-01-20 PROCEDURE — 99214 OFFICE O/P EST MOD 30 MIN: CPT | Performed by: NURSE PRACTITIONER

## 2021-01-20 RX ORDER — SULFAMETHOXAZOLE AND TRIMETHOPRIM 800; 160 MG/1; MG/1
1 TABLET ORAL 2 TIMES DAILY
Qty: 14 TABLET | Refills: 0 | Status: SHIPPED | OUTPATIENT
Start: 2021-01-20 | End: 2021-01-27

## 2021-01-20 RX ORDER — MULTIPLE VITAMINS W/ MINERALS TAB 9MG-400MCG
1 TAB ORAL DAILY
COMMUNITY

## 2021-01-20 RX ORDER — TRAZODONE HYDROCHLORIDE 100 MG/1
100 TABLET ORAL NIGHTLY PRN
Qty: 90 TABLET | Refills: 3 | Status: SHIPPED | OUTPATIENT
Start: 2021-01-20

## 2021-01-20 RX ORDER — NICOTINE 21 MG/24HR
1 PATCH, TRANSDERMAL 24 HOURS TRANSDERMAL EVERY 24 HOURS
Qty: 28 EACH | Refills: 5 | Status: SHIPPED | OUTPATIENT
Start: 2021-01-20 | End: 2021-07-27 | Stop reason: SDUPTHER

## 2021-01-20 NOTE — PROGRESS NOTES
Chief Complaint / Reason:      Chief Complaint   Patient presents with   • Ear Injury     spider bite in right ear 3 months ago. now it bust open again       Subjective     HPI  Patient presents today with complaints of ear issue on right side and states that she thought that she got a spider bite about 3 months ago and it has progressively worsened she has tried over-the-counter medication in addition to Bactroban and has tried draining it several times but it keeps busting open.  Patient does wear earplugs when she goes to the gym and does sleep on the right side.  She denies fever or chills.  She does have some blackheads in her ear but her ear canal is tender to touch and the area seems like it has gotten smaller but keeps returning causing her discomfort.  Patient is a current every day smoker and states that she would like to quit as she is almost up to pack per day.  Patient is a  and works at [employer] but has to get coverage when she goes out to smoke.  She states she has been under a lot of stress.  Denies SI or HI.  Patient is also requesting refill on trazodone to help her sleep and she does have a history of depression.  History taken from: patient    PMH/FH/Social History were reviewed and updated appropriately in the electronic medical record.   Past Medical History:   Diagnosis Date   • Abnormal Pap smear of cervix    • Anxiety    • Heroin overdose (CMS/[facility])    • HPV in female    • Major depressive disorder    • Suicide attempt (CMS/[facility])      Past Surgical History:   Procedure Laterality Date   • CERCLAGE CERVIX     • CERVICAL BIOPSY  W/ LOOP ELECTRODE EXCISION     • D&C HYSTEROSCOPY     • KNEE ARTHROSCOPY Left 2005    [facility]    • SALPINGECTOMY       Social History     Socioeconomic History   • Marital status: Single     Spouse name: Not on file   • Number of children: Not on file   • Years of education: Not on file   • Highest education level: Not  on file   Tobacco Use   • Smoking status: Current Every Day Smoker     Packs/day: 0.50     Years: 14.00     Pack years: 7.00     Types: Cigarettes   • Smokeless tobacco: Never Used   Substance and Sexual Activity   • Alcohol use: No     Frequency: Never     Comment: 1 pint of liquor weekly/ previously,sober 63 days   • Drug use: Yes     Types: Methamphetamines, Heroin     Comment: Sober 8 months   • Sexual activity: Yes     Partners: Male     Family History   Problem Relation Age of Onset   • Depression Mother    • Anxiety disorder Mother    • Alcohol abuse Mother    • Schizophrenia Sister    • Bipolar disorder Sister    • Anxiety disorder Sister        Review of Systems:   Review of Systems   Constitutional: Positive for fatigue.   HENT: Positive for ear discharge, ear pain and sore throat.    Respiratory: Negative.    Cardiovascular: Negative.    Gastrointestinal: Negative.    Allergic/Immunologic: Positive for environmental allergies.   Psychiatric/Behavioral: Positive for sleep disturbance and stress.         All other systems were reviewed and are negative.  Exceptions are noted in the subjective or above.      Objective     Vital Signs  Vitals:    01/20/21 1128   BP: 149/77   Pulse: 113   Resp: 16   Temp: 100 °F (37.8 °C)   SpO2: 99%       Body mass index is 26.15 kg/m².    Physical Exam  Vitals signs and nursing note reviewed.   Constitutional:       General: She is not in acute distress.     Appearance: She is well-developed.   HENT:      Head: Normocephalic and atraumatic.      Right Ear: Ear canal and external ear normal. Tympanic membrane is erythematous and bulging.      Left Ear: Tenderness present. Tympanic membrane is erythematous and bulging.      Ears:        Comments: Irregular shaped abscess with hard substance along with blackheads noted     Nose: Mucosal edema present. No rhinorrhea.      Right Sinus: No maxillary sinus tenderness or frontal sinus tenderness.      Left Sinus: No maxillary sinus  tenderness or frontal sinus tenderness.      Mouth/Throat:      Mouth: Mucous membranes are dry.      Pharynx: Posterior oropharyngeal erythema present.      Comments: PND    Eyes:      Conjunctiva/sclera: Conjunctivae normal.   Neck:      Musculoskeletal: Spinous process tenderness present.      Thyroid: Thyroid tenderness present.     Cardiovascular:      Rate and Rhythm: Normal rate and regular rhythm.      Heart sounds: Normal heart sounds.   Pulmonary:      Effort: Pulmonary effort is normal. No respiratory distress.      Breath sounds: Normal breath sounds.   Lymphadenopathy:      Head:      Right side of head: No submental, submandibular or tonsillar adenopathy.      Left side of head: No submental, submandibular or tonsillar adenopathy.      Cervical: No cervical adenopathy.   Skin:     General: Skin is warm and dry.      Capillary Refill: Capillary refill takes less than 2 seconds.      Findings: No rash.   Neurological:      Mental Status: She is alert and oriented to person, place, and time.   Psychiatric:         Behavior: Behavior normal.         Thought Content: Thought content normal.         Judgment: Judgment normal.              Results Review:    I reviewed the patient's previous clinical results.       Medication Review:     Current Outpatient Medications:   •  multivitamin with minerals tablet tablet, Take 1 tablet by mouth Daily. Along with vitamin c and d, Disp: , Rfl:   •  traZODone (DESYREL) 100 MG tablet, Take 1 tablet by mouth At Night As Needed for Sleep., Disp: 90 tablet, Rfl: 3  •  nicotine (NICODERM CQ) 21 MG/24HR patch, Place 1 patch on the skin as directed by provider Daily., Disp: 28 each, Rfl: 5  •  sulfamethoxazole-trimethoprim (Bactrim DS) 800-160 MG per tablet, Take 1 tablet by mouth 2 (Two) Times a Day for 7 days., Disp: 14 tablet, Rfl: 0    Diagnoses and all orders for this visit:    Abscess of right ear canal  -     sulfamethoxazole-trimethoprim (Bactrim DS) 800-160 MG per  tablet; Take 1 tablet by mouth 2 (Two) Times a Day for 7 days.  -     Ambulatory Referral to ENT (Otolaryngology)  Discussed nonpharmacological interventions with patient and advised her to avoid wearing earplugs   Sleeping difficulties  -     traZODone (DESYREL) 100 MG tablet; Take 1 tablet by mouth At Night As Needed for Sleep.  Discussed sleep hygiene advised patient get adequate rest hydration nutrition  Tobacco abuse  -     nicotine (NICODERM CQ) 21 MG/24HR patch; Place 1 patch on the skin as directed by provider Daily.  Zeenat Murrieta  reports that she has been smoking cigarettes. She has a 7.00 pack-year smoking history. She has never used smokeless tobacco.. I have educated her on the risk of diseases from using tobacco products such as cancer, COPD, heart disease and reproductive problems.     I advised her to quit and she is willing to quit. We have discussed the following method/s for tobacco cessation:  Education Material Counseling [place] OTC Cessation Products Prescription Medicaiton.  Together we have set a quit date for 1 month from today.  She will follow up with me in 1 weeks or sooner to check on her progress.    I spent 3.5 minutes counseling the patient.         Other orders  -     multivitamin with minerals tablet tablet; Take 1 tablet by mouth Daily. Along with vitamin c and d          Return in about 4 weeks (around 2/17/2021), or if symptoms worsen or fail to improve, for Annual.    Melissa Kuhn, APRN  01/20/2021

## 2021-02-03 ENCOUNTER — OFFICE VISIT (OUTPATIENT)
Dept: INTERNAL MEDICINE | Facility: CLINIC | Age: 31
End: 2021-02-03

## 2021-02-03 VITALS
RESPIRATION RATE: 15 BRPM | TEMPERATURE: 99.3 F | HEIGHT: 66 IN | SYSTOLIC BLOOD PRESSURE: 122 MMHG | OXYGEN SATURATION: 99 % | WEIGHT: 163 LBS | BODY MASS INDEX: 26.2 KG/M2 | DIASTOLIC BLOOD PRESSURE: 73 MMHG | HEART RATE: 88 BPM

## 2021-02-03 DIAGNOSIS — Z00.00 PHYSICAL EXAM, ANNUAL: Primary | ICD-10-CM

## 2021-02-03 DIAGNOSIS — Z72.0 TOBACCO ABUSE: ICD-10-CM

## 2021-02-03 DIAGNOSIS — F33.0 MILD EPISODE OF RECURRENT MAJOR DEPRESSIVE DISORDER (HCC): ICD-10-CM

## 2021-02-03 DIAGNOSIS — Z13.29 SCREENING FOR ENDOCRINE, NUTRITIONAL, METABOLIC AND IMMUNITY DISORDER: ICD-10-CM

## 2021-02-03 DIAGNOSIS — G47.9 SLEEPING DIFFICULTIES: ICD-10-CM

## 2021-02-03 DIAGNOSIS — Z13.21 SCREENING FOR ENDOCRINE, NUTRITIONAL, METABOLIC AND IMMUNITY DISORDER: ICD-10-CM

## 2021-02-03 DIAGNOSIS — Z11.59 NEED FOR HEPATITIS C SCREENING TEST: ICD-10-CM

## 2021-02-03 DIAGNOSIS — Z13.0 SCREENING FOR ENDOCRINE, NUTRITIONAL, METABOLIC AND IMMUNITY DISORDER: ICD-10-CM

## 2021-02-03 DIAGNOSIS — Z13.228 SCREENING FOR ENDOCRINE, NUTRITIONAL, METABOLIC AND IMMUNITY DISORDER: ICD-10-CM

## 2021-02-03 DIAGNOSIS — F19.11 HISTORY OF SUBSTANCE ABUSE (HCC): ICD-10-CM

## 2021-02-03 PROCEDURE — 99395 PREV VISIT EST AGE 18-39: CPT | Performed by: NURSE PRACTITIONER
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
## 2021-02-10 LAB
25(OH)D3+25(OH)D2 SERPL-MCNC: 35.1 NG/ML (ref 30–100)
ALBUMIN SERPL-MCNC: 4.7 G/DL (ref 3.5–5.2)
ALBUMIN/GLOB SERPL: 1.7 G/DL
ALP SERPL-CCNC: 67 U/L (ref 39–117)
ALT SERPL-CCNC: 43 U/L (ref 1–33)
AST SERPL-CCNC: 37 U/L (ref 1–32)
BASOPHILS # BLD AUTO: 0.03 10*3/MM3 (ref 0–0.2)
BASOPHILS NFR BLD AUTO: 0.6 % (ref 0–1.5)
BILIRUB SERPL-MCNC: 0.6 MG/DL (ref 0–1.2)
BUN SERPL-MCNC: 10 MG/DL (ref 6–20)
BUN/CREAT SERPL: 18.2 (ref 7–25)
CALCIUM SERPL-MCNC: 9.6 MG/DL (ref 8.6–10.5)
CHLORIDE SERPL-SCNC: 101 MMOL/L (ref 98–107)
CHOLEST SERPL-MCNC: 165 MG/DL (ref 0–200)
CO2 SERPL-SCNC: 28.8 MMOL/L (ref 22–29)
CREAT SERPL-MCNC: 0.55 MG/DL (ref 0.57–1)
EOSINOPHIL # BLD AUTO: 0.08 10*3/MM3 (ref 0–0.4)
EOSINOPHIL NFR BLD AUTO: 1.5 % (ref 0.3–6.2)
ERYTHROCYTE [DISTWIDTH] IN BLOOD BY AUTOMATED COUNT: 12.5 % (ref 12.3–15.4)
GLOBULIN SER CALC-MCNC: 2.7 GM/DL
GLUCOSE SERPL-MCNC: 95 MG/DL (ref 65–99)
HCT VFR BLD AUTO: 41.4 % (ref 34–46.6)
HCV AB S/CO SERPL IA: >11 S/CO RATIO (ref 0–0.9)
HDLC SERPL-MCNC: 54 MG/DL (ref 40–60)
HGB BLD-MCNC: 14.1 G/DL (ref 12–15.9)
IMM GRANULOCYTES # BLD AUTO: 0.01 10*3/MM3 (ref 0–0.05)
IMM GRANULOCYTES NFR BLD AUTO: 0.2 % (ref 0–0.5)
LDLC SERPL CALC-MCNC: 90 MG/DL (ref 0–100)
LYMPHOCYTES # BLD AUTO: 2.32 10*3/MM3 (ref 0.7–3.1)
LYMPHOCYTES NFR BLD AUTO: 42.6 % (ref 19.6–45.3)
MCH RBC QN AUTO: 28.5 PG (ref 26.6–33)
MCHC RBC AUTO-ENTMCNC: 34.1 G/DL (ref 31.5–35.7)
MCV RBC AUTO: 83.6 FL (ref 79–97)
MONOCYTES # BLD AUTO: 0.48 10*3/MM3 (ref 0.1–0.9)
MONOCYTES NFR BLD AUTO: 8.8 % (ref 5–12)
NEUTROPHILS # BLD AUTO: 2.52 10*3/MM3 (ref 1.7–7)
NEUTROPHILS NFR BLD AUTO: 46.3 % (ref 42.7–76)
NRBC BLD AUTO-RTO: 0 /100 WBC (ref 0–0.2)
PLATELET # BLD AUTO: 221 10*3/MM3 (ref 140–450)
POTASSIUM SERPL-SCNC: 4.5 MMOL/L (ref 3.5–5.2)
PROT SERPL-MCNC: 7.4 G/DL (ref 6–8.5)
RBC # BLD AUTO: 4.95 10*6/MM3 (ref 3.77–5.28)
SODIUM SERPL-SCNC: 138 MMOL/L (ref 136–145)
T4 FREE SERPL-MCNC: 1.22 NG/DL (ref 0.93–1.7)
TRIGL SERPL-MCNC: 117 MG/DL (ref 0–150)
TSH SERPL DL<=0.005 MIU/L-ACNC: 1.24 UIU/ML (ref 0.27–4.2)
VLDLC SERPL CALC-MCNC: 21 MG/DL (ref 5–40)
WBC # BLD AUTO: 5.44 10*3/MM3 (ref 3.4–10.8)

## 2021-02-15 DIAGNOSIS — R74.8 ELEVATED LIVER ENZYMES: ICD-10-CM

## 2021-02-15 DIAGNOSIS — R76.8 HEPATITIS C ANTIBODY TEST POSITIVE: Primary | ICD-10-CM

## 2021-02-18 LAB
GGT SERPL-CCNC: 26 IU/L (ref 0–60)
HCV RNA SERPL NAA+PROBE-ACNC: NORMAL IU/ML
TEST INFORMATION: NORMAL

## 2021-03-17 ENCOUNTER — OFFICE VISIT (OUTPATIENT)
Dept: PSYCHIATRY | Facility: CLINIC | Age: 31
End: 2021-03-17

## 2021-03-17 DIAGNOSIS — F11.21 OPIOID DEPENDENCE IN REMISSION (HCC): ICD-10-CM

## 2021-03-17 DIAGNOSIS — F33.2 SEVERE EPISODE OF RECURRENT MAJOR DEPRESSIVE DISORDER, WITHOUT PSYCHOTIC FEATURES (HCC): ICD-10-CM

## 2021-03-17 DIAGNOSIS — F60.3 BORDERLINE PERSONALITY DISORDER (HCC): Primary | ICD-10-CM

## 2021-03-17 PROCEDURE — 90791 PSYCH DIAGNOSTIC EVALUATION: CPT | Performed by: SOCIAL WORKER

## 2021-03-20 ENCOUNTER — HOSPITAL ENCOUNTER (EMERGENCY)
Facility: HOSPITAL | Age: 31
Discharge: HOME | End: 2021-03-21
Attending: EMERGENCY MEDICINE | Admitting: EMERGENCY MEDICINE

## 2021-03-20 ENCOUNTER — APPOINTMENT (OUTPATIENT)
Dept: GENERAL RADIOLOGY | Facility: HOSPITAL | Age: 31
End: 2021-03-20

## 2021-03-20 DIAGNOSIS — R00.2 PALPITATIONS: Primary | ICD-10-CM

## 2021-03-20 LAB
ALBUMIN SERPL-MCNC: 4.5 G/DL (ref 3.5–5.2)
ALBUMIN/GLOB SERPL: 1.8 G/DL
ALP SERPL-CCNC: 71 U/L (ref 39–117)
ALT SERPL W P-5'-P-CCNC: 27 U/L (ref 1–33)
ANION GAP SERPL CALCULATED.3IONS-SCNC: 10.4 MMOL/L (ref 5–15)
AST SERPL-CCNC: 30 U/L (ref 1–32)
BASOPHILS # BLD AUTO: 0.03 10*3/MM3 (ref 0–0.2)
BASOPHILS NFR BLD AUTO: 0.4 % (ref 0–1.5)
BILIRUB SERPL-MCNC: 0.6 MG/DL (ref 0–1.2)
BUN SERPL-MCNC: 11 MG/DL (ref 6–20)
BUN/CREAT SERPL: 16.7 (ref 7–25)
CALCIUM SPEC-SCNC: 9.2 MG/DL (ref 8.6–10.5)
CHLORIDE SERPL-SCNC: 102 MMOL/L (ref 98–107)
CO2 SERPL-SCNC: 25.6 MMOL/L (ref 22–29)
CREAT SERPL-MCNC: 0.66 MG/DL (ref 0.57–1)
DEPRECATED RDW RBC AUTO: 39.6 FL (ref 37–54)
EOSINOPHIL # BLD AUTO: 0.12 10*3/MM3 (ref 0–0.4)
EOSINOPHIL NFR BLD AUTO: 1.5 % (ref 0.3–6.2)
ERYTHROCYTE [DISTWIDTH] IN BLOOD BY AUTOMATED COUNT: 12.8 % (ref 12.3–15.4)
GFR SERPL CREATININE-BSD FRML MDRD: 106 ML/MIN/1.73
GLOBULIN UR ELPH-MCNC: 2.5 GM/DL
GLUCOSE SERPL-MCNC: 84 MG/DL (ref 65–99)
HCT VFR BLD AUTO: 39.5 % (ref 34–46.6)
HGB BLD-MCNC: 13.4 G/DL (ref 12–15.9)
IMM GRANULOCYTES # BLD AUTO: 0.01 10*3/MM3 (ref 0–0.05)
IMM GRANULOCYTES NFR BLD AUTO: 0.1 % (ref 0–0.5)
LYMPHOCYTES # BLD AUTO: 3.48 10*3/MM3 (ref 0.7–3.1)
LYMPHOCYTES NFR BLD AUTO: 44.7 % (ref 19.6–45.3)
MCH RBC QN AUTO: 28.6 PG (ref 26.6–33)
MCHC RBC AUTO-ENTMCNC: 33.9 G/DL (ref 31.5–35.7)
MCV RBC AUTO: 84.2 FL (ref 79–97)
MONOCYTES # BLD AUTO: 0.64 10*3/MM3 (ref 0.1–0.9)
MONOCYTES NFR BLD AUTO: 8.2 % (ref 5–12)
NEUTROPHILS NFR BLD AUTO: 3.51 10*3/MM3 (ref 1.7–7)
NEUTROPHILS NFR BLD AUTO: 45.1 % (ref 42.7–76)
NRBC BLD AUTO-RTO: 0 /100 WBC (ref 0–0.2)
PLATELET # BLD AUTO: 226 10*3/MM3 (ref 140–450)
PMV BLD AUTO: 10.3 FL (ref 6–12)
POTASSIUM SERPL-SCNC: 3.5 MMOL/L (ref 3.5–5.2)
PROT SERPL-MCNC: 7 G/DL (ref 6–8.5)
RBC # BLD AUTO: 4.69 10*6/MM3 (ref 3.77–5.28)
SODIUM SERPL-SCNC: 138 MMOL/L (ref 136–145)
TROPONIN T SERPL-MCNC: <0.01 NG/ML (ref 0–0.03)
WBC # BLD AUTO: 7.79 10*3/MM3 (ref 3.4–10.8)

## 2021-03-20 PROCEDURE — 80053 COMPREHEN METABOLIC PANEL: CPT | Performed by: EMERGENCY MEDICINE

## 2021-03-20 PROCEDURE — 93005 ELECTROCARDIOGRAM TRACING: CPT | Performed by: EMERGENCY MEDICINE

## 2021-03-20 PROCEDURE — 71045 X-RAY EXAM CHEST 1 VIEW: CPT

## 2021-03-20 PROCEDURE — 85025 COMPLETE CBC W/AUTO DIFF WBC: CPT | Performed by: EMERGENCY MEDICINE

## 2021-03-20 PROCEDURE — 84484 ASSAY OF TROPONIN QUANT: CPT | Performed by: EMERGENCY MEDICINE

## 2021-03-20 PROCEDURE — 99284 EMERGENCY DEPT VISIT MOD MDM: CPT

## 2021-03-20 PROCEDURE — 84439 ASSAY OF FREE THYROXINE: CPT | Performed by: PHYSICIAN ASSISTANT

## 2021-03-20 PROCEDURE — 84443 ASSAY THYROID STIM HORMONE: CPT | Performed by: PHYSICIAN ASSISTANT

## 2021-03-20 RX ORDER — SODIUM CHLORIDE 0.9 % (FLUSH) 0.9 %
10 SYRINGE (ML) INJECTION AS NEEDED
Status: DISCONTINUED | OUTPATIENT
Start: 2021-03-20 | End: 2021-03-21 | Stop reason: HOSPADM

## 2021-03-21 VITALS
HEIGHT: 66 IN | WEIGHT: 161 LBS | TEMPERATURE: 98.1 F | HEART RATE: 78 BPM | SYSTOLIC BLOOD PRESSURE: 107 MMHG | RESPIRATION RATE: 20 BRPM | OXYGEN SATURATION: 97 % | BODY MASS INDEX: 25.88 KG/M2 | DIASTOLIC BLOOD PRESSURE: 69 MMHG

## 2021-03-21 LAB
HOLD SPECIMEN: NORMAL
HOLD SPECIMEN: NORMAL
T4 FREE SERPL-MCNC: 1.32 NG/DL (ref 0.93–1.7)
TSH SERPL DL<=0.05 MIU/L-ACNC: 1.06 UIU/ML (ref 0.27–4.2)
WHOLE BLOOD HOLD SPECIMEN: NORMAL
WHOLE BLOOD HOLD SPECIMEN: NORMAL

## 2021-03-21 RX ORDER — HYDROXYZINE HYDROCHLORIDE 25 MG/1
25 TABLET, FILM COATED ORAL ONCE
Status: DISCONTINUED | OUTPATIENT
Start: 2021-03-21 | End: 2021-03-21 | Stop reason: HOSPADM

## 2021-03-21 NOTE — ED PROVIDER NOTES
Subjective   30-year-old female who presents to the emergency department chief complaint chest pain, shortness of breath started 1 to 2 hours for arrival.  Patient states she was at work working as a  she can lightheaded and dizziness.  States she felt chest palpitations.  Patient did have associated shortness of breath and dizziness.  Denies any other associated complaints at this time.      History provided by:  Patient   used: No    Chest Pain  Pain location:  L chest and R chest  Pain quality: aching    Pain radiates to:  Does not radiate  Pain severity:  Moderate  Onset quality:  Gradual  Duration:  1 day  Timing:  Intermittent  Progression:  Worsening  Chronicity:  New  Context: not breathing, not eating, not intercourse and not lifting    Relieved by:  Nothing  Worsened by:  Nothing  Ineffective treatments:  None tried  Associated symptoms: palpitations    Associated symptoms: no abdominal pain, no altered mental status, no anorexia, no back pain, no cough, no dysphagia, no fatigue, no fever, no headache, no heartburn, no nausea, no PND, no shortness of breath and no weakness    Risk factors: no birth control, no hypertension, no immobilization, not male, not pregnant, no prior DVT/PE and no smoking        Review of Systems   Constitutional: Negative.  Negative for fatigue and fever.   HENT: Negative.  Negative for trouble swallowing.    Eyes: Negative.    Respiratory: Positive for chest tightness. Negative for cough and shortness of breath.    Cardiovascular: Positive for chest pain and palpitations. Negative for PND.   Gastrointestinal: Negative for abdominal pain, anorexia, heartburn and nausea.   Endocrine: Negative.  Negative for cold intolerance, heat intolerance, polydipsia and polyphagia.   Genitourinary: Negative.  Negative for dysuria, flank pain, genital sores, hematuria and menstrual problem.   Musculoskeletal: Negative.  Negative for back pain, joint swelling and  myalgias.   Skin: Negative.  Negative for pallor and rash.   Neurological: Negative for weakness and headaches.   All other systems reviewed and are negative.      Past Medical History:   Diagnosis Date   • Abnormal Pap smear of cervix    • Anxiety    • Heroin overdose (CMS/HCC)    • HPV in female    • Major depressive disorder    • Suicide attempt (CMS/HCC)        Allergies   Allergen Reactions   • Atarax [Hydroxyzine] GI Intolerance       Past Surgical History:   Procedure Laterality Date   • CERCLAGE CERVIX     • CERVICAL BIOPSY  W/ LOOP ELECTRODE EXCISION     • D & C HYSTEROSCOPY     • KNEE ARTHROSCOPY Left 2005    [facility]    • SALPINGECTOMY         Family History   Problem Relation Age of Onset   • Depression Mother    • Anxiety disorder Mother    • Alcohol abuse Mother    • Schizophrenia Sister    • Bipolar disorder Sister    • Anxiety disorder Sister        Social History     Socioeconomic History   • Marital status: Single     Spouse name: Not on file   • Number of children: Not on file   • Years of education: Not on file   • Highest education level: Not on file   Tobacco Use   • Smoking status: Former Smoker     Packs/day: 0.50     Years: 14.00     Pack years: 7.00     Types: Cigarettes   • Smokeless tobacco: Never Used   Substance and Sexual Activity   • Alcohol use: No     Comment: 1 pint of liquor weekly/ previously,sober 63 days   • Drug use: Yes     Types: Methamphetamines, Heroin     Comment: Sober 8 months   • Sexual activity: Yes     Partners: Male           Objective   Physical Exam  Vitals and nursing note reviewed.   Constitutional:       General: She is not in acute distress.     Appearance: Normal appearance. She is normal weight. She is not ill-appearing, toxic-appearing or diaphoretic.   HENT:      Head: Normocephalic and atraumatic.      Right Ear: Tympanic membrane, ear canal and external ear normal. There is no impacted cerumen.      Left Ear: Tympanic  membrane, ear canal and external ear normal. There is no impacted cerumen.      Nose: Nose normal. No congestion or rhinorrhea.      Mouth/Throat:      Mouth: Mucous membranes are moist.      Pharynx: Oropharynx is clear. No oropharyngeal exudate or posterior oropharyngeal erythema.   Eyes:      General: No scleral icterus.        Right eye: No discharge.         Left eye: No discharge.      Extraocular Movements: Extraocular movements intact.      Conjunctiva/sclera: Conjunctivae normal.      Pupils: Pupils are equal, round, and reactive to light.   Neck:      Vascular: No carotid bruit.   Cardiovascular:      Rate and Rhythm: Normal rate and regular rhythm.      Pulses: Normal pulses.      Heart sounds: Normal heart sounds. No murmur heard.   No friction rub. No gallop.    Pulmonary:      Effort: Pulmonary effort is normal. No respiratory distress.      Breath sounds: No stridor. No wheezing, rhonchi or rales.   Chest:      Chest wall: No tenderness.   Abdominal:      General: Abdomen is flat. Bowel sounds are normal. There is no distension.      Palpations: Abdomen is soft. There is no mass.      Tenderness: There is no abdominal tenderness. There is no right CVA tenderness, left CVA tenderness, guarding or rebound.      Hernia: No hernia is present.   Musculoskeletal:         General: No swelling, tenderness, deformity or signs of injury. Normal range of motion.      Cervical back: Normal range of motion and neck supple. No rigidity or tenderness.      Right lower leg: No edema.      Left lower leg: No edema.   Lymphadenopathy:      Cervical: No cervical adenopathy.   Skin:     General: Skin is warm and dry.      Capillary Refill: Capillary refill takes less than 2 seconds.      Coloration: Skin is not jaundiced or pale.      Findings: No bruising, erythema, lesion or rash.   Neurological:      General: No focal deficit present.      Mental Status: She is alert and oriented to person, place, and time.       Cranial Nerves: No cranial nerve deficit.      Sensory: No sensory deficit.      Motor: No weakness.      Coordination: Coordination normal.      Gait: Gait normal.      Deep Tendon Reflexes: Reflexes normal.   Psychiatric:         Mood and Affect: Mood normal.         Behavior: Behavior normal.         Thought Content: Thought content normal.         Judgment: Judgment normal.         Procedures           ED Course  ED Course as of Mar 21 0027   Sat Mar 20, 2021   2321 EKG interpreted by me.  Sinus rhythm.  Rate of 98.  No ST segment or T wave changes.  Normal EKG.    [CG]   Sun Mar 21, 2021   0024 30-year-old female.     []   0026 30-year-old female who presents to the emergency department with chief complaint palpitations while at work earlier this day.  Patient EKG that showed normal sinus rhythm.  Patient instructed to follow-up with cardiology for Holter monitoring.  Return to the emergency department if condition worsens.    []      ED Course User Index  [] Antonio Smith PA-C  [] Nehemiah Santa, DO                                           MDM    Final diagnoses:   Palpitations       ED Disposition  ED Disposition     ED Disposition Condition Comment    Discharge Stable           Forrest James MD  [address] [place], [place] [id number] [phone]    Call in 1 day           Medication List      No changes were made to your prescriptions during this visit.          Antonio Smith PA-C  03/21/21 0027

## 2021-03-23 NOTE — PROGRESS NOTES
"Patient ID: Zeenat Murrieta is a 30 y.o. female presenting to [facility] for assessment with Zehra Quigley LCSW.     Time: 10:00 am   Time out: 11:00 am     Description of current emotional/behavioral concerns: Patient states that she has been working at the [employer] working as a  and serving. 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 Patient previously worked in IOP program at this clinic, and received treatment for Opioid abuse. 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 She states that she is \"miserable\" and describes feel dissatisfaction with life for several months. She states that she ruined her marriage to her , and states she is now causing problems in her current relationship. She states that there is nothing wrong with her partner but she continues to struggle to feel hopeful and \"hates her life\". Patient states that she is not experiencing any suicidal ideations at this time.     Patient adamantly and convincingly denies current suicidal or homicidal ideation or perceptual disturbance.    Significant Life Events  Has patient been through or witnessed a divorce? yes      Has patient experienced a death / loss of relationship? no      Has patient experienced a major accident or tragic events? yes      Has patient experienced any other significant life events or trauma (such as verbal, physical, sexual abuse)? no      Work History  Highest level of education obtained: college    Ever been active duty in the ? no    Patient's Occupation: /    Legal History  The patient has had legal significant legal charges for an incident that occurred in [place]. " Patient states a hospital filed charges on her that lead to her nursing license being revoked. .    Interpersonal/Relational  Marital Status: single,   Patient's current living situation: Lives with boyfriend  Support system: significant other  Difficulty getting along with peers: no  Difficulty making new friendships: no  Difficulty maintaining friendships: no  Close with family members: yes    Mental/Behavioral Health History  History of prior treatment or hospitalization: Yes previous treatment at this clinic    Are there any significant health issues (current or past): no    History of seizures: no    Family History   Problem Relation Age of Onset   • Depression Mother    • Anxiety disorder Mother    • Alcohol abuse Mother    • Schizophrenia Sister    • Bipolar disorder Sister    • Anxiety disorder Sister        Current Medications:   Current Outpatient Medications   Medication Sig Dispense Refill   • multivitamin with minerals tablet tablet Take 1 tablet by mouth Daily. Along with vitamin c and d     • nicotine (NICODERM CQ) 21 MG/24HR patch Place 1 patch on the skin as directed by provider Daily. 28 each 5   • traZODone (DESYREL) 100 MG tablet Take 1 tablet by mouth At Night As Needed for Sleep. 90 tablet 3     No current facility-administered medications for this visit.       History of Substance Use:   Patient answered yes  to experiencing two or more of the following problems related to substance use: using more than intended or over longer period than intended; difficulty quitting or cutting back use; spending a great deal of time obtaining, using, or recovering from using; craving or strong desire or urge to use;  work and/or school problems; financial problems; family problems; using in dangerous situations; physical or mental health problems; relapse; feelings of guilt or remorse about use; times when used and/or drank alone; needing to use more in order to achieve the desired effect; illness or  withdrawal when stopping or cutting back use; using to relieve or avoid getting ill or developing withdrawal symptoms; and black outs and/or memory issues when using.        Substance Age Frequency Amount Method Last use   Nicotine        Alcohol        Marijuana        Benzo        Pain Pills        Cocaine        Meth        Heroin        Suboxone        Synthetics/Other:    Past opioid use          SUICIDE RISK ASSESSMENT/CSSRS  1. Does patient have thoughts of suicide? no  2. Does patient have intent for suicide? no  3. Does patient have a current plan for suicide? no  4. History of suicide attempts: no  5. Family history of suicide or attempts: no  6. History of violent behaviors towards others or property or thoughts of committing suicide: no  7. History of sexual aggression toward others: no  8. Access to firearms or weapons: no    Mental Status Exam:   Hygiene:   good  Cooperation:  Cooperative  Eye Contact:  Good  Psychomotor Behavior:  Appropriate  Affect:  Full range  Mood: normal  Hopelessness: Denies  Speech:  Normal  Thought Process:  Goal directed  Thought Content:  Normal  Suicidal:  None  Homicidal:  None  Hallucinations:  None  Delusion:  None  Memory:  Intact  Orientation:  Person, Place, Time and Situation  Reliability:  good  Insight:  Good  Judgement:  Good  Impulse Control:  Good    Impression/Formulation:    VISIT DIAGNOSIS:     ICD-10-CM ICD-9-CM   1. Borderline personality disorder (CMS/HCC)  F60.3 301.83   2. Severe episode of recurrent major depressive disorder, without psychotic features (CMS/HCC)  F33.2 296.33   3. Opioid dependence in remission (CMS/HCC)  F11.21 304.03        Patient appeared alert and oriented.  Patient is voluntarily requesting to begin outpatient therapy at [facility].  Patient is receptive to assistance with maintaining a stable lifestyle.  Patient presents with history of depression.  Patient is agreeable to attend routine therapy sessions.   "Patient expressed desire to maintain stability and participate in the therapeutic process.        Crisis Plan:  Symptoms and/or behaviors to indicate a crisis: Extreme mood changes; including uncontrollable \"highs\" or euphoria    What calming techniques or other strategies will patient use to de-esclate and stay safe: slow down, breathe, visualize calming self, think it though, listen to music, change focus, take a walk    Who is one person patient can contact to assist with de-escalation? Her mother    If symptoms/behaviors persist, patient will present to the nearest hospital for an assessment. Advised patient of [facility] 24/7 assessment services.       Plan:   Obtain release of information for current treatment team for continuity of care  Patient will adhere to medication regimen as prescribed and report any side effects. Patient will contact this office, call 911 or present to the nearest emergency room should suicidal or homicidal ideations occur.  Begin psychotherapy         This document has been electronically signed by ZIA Guillen, SHIRAZ  March 23, 2021 13:31 EDT    Part of this note may be an electronic transcription/translation of spoken language to printed text using the Dragon Dictation System.  "

## 2021-05-25 ENCOUNTER — OFFICE VISIT (OUTPATIENT)
Dept: PSYCHIATRY | Facility: CLINIC | Age: 31
End: 2021-05-25

## 2021-05-25 DIAGNOSIS — F43.89 OTHER REACTIONS TO SEVERE STRESS: Primary | ICD-10-CM

## 2021-05-25 PROCEDURE — 90837 PSYTX W PT 60 MINUTES: CPT | Performed by: SOCIAL WORKER

## 2021-05-25 NOTE — PROGRESS NOTES
Date: May 26, 2021  Time In: 2:30 pm   Time Out: 3:30 pm       PROGRESS NOTE  Data:  Zeenat Murrieta is a 30 y.o. female who presents today for individual therapy session at [facility].      Patient states that she has been doing much better recently. She states that she has completed all of the terms of her probation . SH estas that she is hoping that she will also be able to get her license reinstated with the [place] [employer]. 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 She states that she is planning on moving to [place] this summer to be closer to her children. Patient expressed intense sadness that she cannot see her children, and feels poorly that she has missed out on her life. She states that she believes her substance abuse began with her difficult pregnancy with her daughter, and due to a thin cervix, and having part of her placenta not detatched. She states she had to have multiple surgeries that made her addicted to pain medications. Patient appears very calm and states that she has been able to accept negative feelings as they come.       Clinical Maneuvering/Intervention:    Assisted patient in processing above session content; acknowledged and normalized patient’s thoughts, feelings, and concerns.  Rationalized patient thought process regarding Stress.  Discussed triggers associated with patient's Stress.  Also discussed coping skills for patient to implement such as deep breathing, mindfulness, taking a walk.    Allowed patient to freely discuss issues without interruption or judgment. Provided safe, confidential environment to facilitate the development of positive therapeutic relationship and encourage open, honest communication. Assisted patient in identifying risk factors which would indicate the need for higher level of care including thoughts to harm self or others and/or  self-harming behavior and encouraged patient to contact this office, call 911, or present to the nearest emergency room should any of these events occur. Discussed crisis intervention services and means to access. Patient adamantly and convincingly denies current suicidal or homicidal ideation or perceptual disturbance.    Assessment   Patient appears to maintain relative stability as compared to their baseline.  However, patient continues to struggle with deep breathing which continues to cause impairment in important areas of functioning.  A result, they can be reasonably expected to continue to benefit from treatment and would likely be at increased risk for decompensation otherwise.    Mental Status Exam:   Hygiene:   good  Cooperation:  Cooperative  Eye Contact:  Good  Psychomotor Behavior:  Appropriate  Affect:  Full range  Mood: normal  Speech:  Normal  Thought Process:  Goal directed  Thought Content:  Normal  Suicidal:  None  Homicidal:  None  Hallucinations:  None  Delusion:  None  Memory:  Intact  Orientation:  Person, Place, Time and Situation  Reliability:  good  Insight:  Good  Judgement:  Good  Impulse Control:  Good  Physical/Medical Issues:  No          Patient's Support Network Includes:  significant other and mother    Functional Status: Mild impairment     Progress toward goal: At goal    Prognosis: Good with Ongoing Treatment          Plan     Patient will continue in individual outpatient therapy with focus on improved functioning and coping skills, maintaining stability, and avoiding decompensation and the need for higher level of care.    Patient will adhere to medication regimen as prescribed and report any side effects. Patient will contact this office, call 911 or present to the nearest emergency room should suicidal or homicidal ideations occur. Provide Cognitive Behavioral Therapy and Solution Focused Therapy to improve functioning, maintain stability, and avoid decompensation and the  need for higher level of care.     Return in about 4 weeks, or earlier if symptoms worsen or fail to improve.           VISIT DIAGNOSIS:     ICD-10-CM ICD-9-CM   1. Other reactions to severe stress  F43.8 309.89             This document has been electronically signed by Zehra Quigley LCSW  May 26, 2021 12:45 EDT      Part of this note may be an electronic transcription/translation of spoken language to printed text using the Dragon Dictation System.

## 2021-05-27 ENCOUNTER — OFFICE VISIT (OUTPATIENT)
Dept: OTOLARYNGOLOGY | Facility: CLINIC | Age: 31
End: 2021-05-27

## 2021-05-27 VITALS
SYSTOLIC BLOOD PRESSURE: 110 MMHG | DIASTOLIC BLOOD PRESSURE: 72 MMHG | WEIGHT: 160.4 LBS | HEIGHT: 66 IN | BODY MASS INDEX: 25.78 KG/M2

## 2021-05-27 DIAGNOSIS — H61.91 SKIN LESION OF RIGHT EXTERNAL EAR: Primary | ICD-10-CM

## 2021-05-27 PROCEDURE — 99203 OFFICE O/P NEW LOW 30 MIN: CPT | Performed by: OTOLARYNGOLOGY

## 2021-05-27 RX ORDER — MULTIVIT WITH MINERALS/LUTEIN
250 TABLET ORAL DAILY
COMMUNITY

## 2021-05-27 RX ORDER — HYDROXYZINE PAMOATE 25 MG/1
25 CAPSULE ORAL 3 TIMES DAILY PRN
COMMUNITY

## 2021-05-27 NOTE — PROGRESS NOTES
ENT New Office Consult Note     Date of Consult: 2021     Patient Name: Zeenat Celis  MRN: [id number]   : 1990     Referring Provider: No ref. provider found    Care Team: Patient Care Team:  Melissa Kuhn APRN as PCP - General (Family Medicine)     Chief Complaint:    Chief Complaint   Patient presents with   • Advice Only     new patient in office for evaluation of small hole in ear.        History of Present Illness: Zeenat Celis is a 30 y.o. female who presents today for right ear.      Patient reports RIGHT EAR SKIN LESION. Symptoms present for APPROXIMATELY 1 YEAR. Attempted treatment includes SELF-DRAINAGE OF PUS with LIMITED relief. NOTHING makes symptoms worse. Associated symptoms are INTERMITTENT PURULENT DRAINAGE ON 2 OCCASIONS.    DENIES KNOWN TRAUMA BUT SUSPECTS POSSIBLE SPIDER BITE OR MAY HAVE PICKED AT LESION WHEN IT STARTED A YEAR AGO.    MS. 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 PAPERWORK FILLED OUT FOR VISIT TODAY.     Subjective      Review of Systems:   Review of Systems   Constitutional: Positive for fatigue.   HENT: Positive for dental problem, ear discharge and ear pain.    Allergic/Immunologic: Positive for environmental allergies.      I have reviewed and confirmed the accuracy of the ROS as documented by the MA/LPN/RN Cassie Levine MD     Pertinent items are noted in HPI.     Past Medical History:   Past Medical History:   Diagnosis Date   • Abnormal Pap smear of cervix    • Anxiety    • Bronchitis    • Headache    • Heroin overdose (CMS/[facility])    • HPV in female    • Major depressive disorder    • Suicide attempt (CMS/[facility])        Past Surgical History:   Past Surgical History:   Procedure Laterality Date   • CERCLAGE CERVIX     • CERVICAL BIOPSY  W/ LOOP ELECTRODE EXCISION     • D & C  HYSTEROSCOPY     • KNEE ARTHROSCOPY Left 2005    [facility]    • SALPINGECTOMY         Family History:   Family History   Problem Relation Age of Onset   • Depression Mother    • Anxiety disorder Mother    • Alcohol abuse Mother    • Schizophrenia Sister    • Bipolar disorder Sister    • Anxiety disorder Sister        Social History:   Social History     Socioeconomic History   • Marital status: Single     Spouse name: Not on file   • Number of children: Not on file   • Years of education: Not on file   • Highest education level: Not on file   Tobacco Use   • Smoking status: Former Smoker     Packs/day: 0.50     Years: 14.00     Pack years: 7.00     Types: Cigarettes   • Smokeless tobacco: Never Used   Substance and Sexual Activity   • Alcohol use: No     Comment: 1 pint of liquor weekly/ previously,sober 63 days   • Drug use: Yes     Types: Methamphetamines, Heroin     Comment: Sober 8 months   • Sexual activity: Yes     Partners: Male       Medications:     Current Outpatient Medications:   •  hydrOXYzine pamoate (VISTARIL) 25 MG capsule, Take 25 mg by mouth 3 (Three) Times a Day As Needed for Itching., Disp: , Rfl:   •  multivitamin with minerals tablet tablet, Take 1 tablet by mouth Daily. Along with vitamin c and d, Disp: , Rfl:   •  nicotine (NICODERM CQ) 21 MG/24HR patch, Place 1 patch on the skin as directed by provider Daily., Disp: 28 each, Rfl: 5  •  traZODone (DESYREL) 100 MG tablet, Take 1 tablet by mouth At Night As Needed for Sleep., Disp: 90 tablet, Rfl: 3  •  vitamin C (ASCORBIC ACID) 250 MG tablet, Take 250 mg by mouth Daily., Disp: , Rfl:   •  vitamin D3 125 MCG (5000 UT) capsule capsule, Take 5,000 Units by mouth Daily., Disp: , Rfl:   •  mupirocin (Bactroban) 2 % ointment, Apply to right ear skin lesion 1-2 times daily for 3 months or until lesion is gone, Disp: 1 each, Rfl: 1    Allergies:   Allergies   Allergen Reactions   • Atarax [Hydroxyzine] GI Intolerance  "      Objective     Physical Exam:  Vital Signs:   Vitals:    05/27/21 1324   BP: 110/72   Weight: 72.8 kg (160 lb 6.4 oz)   Height: 167.6 cm (66\")     Body mass index is 25.89 kg/m².     Physical Exam   Ears: hearing with OUT difficulty,     *RIGHT BHUPENDRA CAVUM WITH APPROXIMATELY 1CM X3MM SUPERIFICIAL SKIN LESION THAT IS ENDOPHYTIC, NONPIGMENTED, NO PURULENCE OR CYST PRESENT TODAY.     LEFT AURICLE intact without deformity, external auditory canals clear    RIGHT: tympanic membrane intact without perforation or visible effusion    LEFT: tympanic membrane intact without perforation or visible effusion    General: alert, interactive, no acute distress  Head: normocephalic, atraumatic  Nose: no significant external deformity  Mouth: lips intact without deformity   Neck: trachea midline, no neck asymmetry   Lung: no stridor or stertor, no respiratory distress  Cardiovascular: no cyanosis  Skin: no concerning skin lesions on face  Neuro: Cranial nerves II-XII otherwise grossly intact  Eye: no pathologic nystagmus  Extremities: no motor asymmetry on gross examination  Psych: appropriately interactive     **PHOTODOCUMENTATION OBTAINED TODAY**     Assessment / Plan      Assessment/Plan:   Diagnoses and all orders for this visit:    1. Skin lesion of right external ear (Primary)    Other orders  -     mupirocin (Bactroban) 2 % ointment; Apply to right ear skin lesion 1-2 times daily for 3 months or until lesion is gone  Dispense: 1 each; Refill: 1         MS. LALITA HAS A SUPERFICIAL NONPIGMENTED SKIN LESION PRESENT FOR 1 YEAR WITH INTERMITTENT INFECTIOUS DRAINAGE. THIS REQUIRES MONITORING. DISCUSSED LIKELY SEBACEOUS CYST- RX MUPIROCIN OINTMENT AND RECHECK 4MO.     INSTRUCTED HER TO CALL FOR RECHECK IF LESION GROWS/BLEEDS.     DISCUSSED OTHER POSSIBLE ETIOLOGIES WHICH MAY INCLUDE RARE CONGENITAL CYST OR POSSIBLY EVEN CUTANEOUS MALIGNANCY. IF THE LESION DOESN'T RESOLVE, WILL NEED TO EXCISE IN OR - THIS SHOULD NOT REQUIRE " MUCH IN THE WAY OF NARCOTIC MEDICATIONS WHICH IS IMPORTANT TO HER.       Follow Up:   Return in about 5 months (around 10/27/2021) for Recheck.        CRISTAL Levine MD

## 2021-06-09 ENCOUNTER — OFFICE VISIT (OUTPATIENT)
Dept: PSYCHIATRY | Facility: CLINIC | Age: 31
End: 2021-06-09

## 2021-06-09 DIAGNOSIS — F43.89 OTHER REACTIONS TO SEVERE STRESS: Primary | ICD-10-CM

## 2021-06-09 PROCEDURE — 90837 PSYTX W PT 60 MINUTES: CPT | Performed by: SOCIAL WORKER

## 2021-06-14 NOTE — PROGRESS NOTES
"Date: June 9, 2021   Time In: 2:30 pm   Time Out: 3:30 pm       PROGRESS NOTE  Data:  Zeenat Murrieta is a 30 y.o. female who presents today for individual therapy session at [facility].      Patient states that she is feeling worn down but is physically good. She states that she has had a lot to do recently. She states that she is waiting on her forms to release her from her \"in lieu of conviction.\" She states that she has been spending time with her children recently. She states that she is looking forward to moving closer to her children in the next few months.     Clinical Maneuvering/Intervention:    Assisted patient in processing above session content; acknowledged and normalized patient’s thoughts, feelings, and concerns.  Rationalized patient thought process regarding Stress.  Discussed triggers associated with patient's Stress.  Also discussed coping skills for patient to implement such as deep breathing, mindfulness, taking a walk.    Allowed patient to freely discuss issues without interruption or judgment. Provided safe, confidential environment to facilitate the development of positive therapeutic relationship and encourage open, honest communication. Assisted patient in identifying risk factors which would indicate the need for higher level of care including thoughts to harm self or others and/or self-harming behavior and encouraged patient to contact this office, call 911, or present to the nearest emergency room should any of these events occur. Discussed crisis intervention services and means to access. Patient adamantly and convincingly denies current suicidal or homicidal ideation or perceptual disturbance.    Assessment   Patient appears to maintain relative stability as compared to their baseline.  However, patient continues to struggle with deep breathing which continues to cause impairment in important areas of functioning.  A result, they can be reasonably expected to " continue to benefit from treatment and would likely be at increased risk for decompensation otherwise.    Mental Status Exam:   Hygiene:   good  Cooperation:  Cooperative  Eye Contact:  Good  Psychomotor Behavior:  Appropriate  Affect:  Full range  Mood: normal  Speech:  Normal  Thought Process:  Goal directed  Thought Content:  Normal  Suicidal:  None  Homicidal:  None  Hallucinations:  None  Delusion:  None  Memory:  Intact  Orientation:  Person, Place, Time and Situation  Reliability:  good  Insight:  Good  Judgement:  Good  Impulse Control:  Good  Physical/Medical Issues:  No          Patient's Support Network Includes:  significant other and mother    Functional Status: Mild impairment     Progress toward goal: At goal    Prognosis: Good with Ongoing Treatment       Patient will continue in individual outpatient therapy with focus on improved functioning and coping skills, maintaining stability, and avoiding decompensation and the need for higher level of care.    Patient will adhere to medication regimen as prescribed and report any side effects. Patient will contact this office, call 911 or present to the nearest emergency room should suicidal or homicidal ideations occur. Provide Cognitive Behavioral Therapy and Solution Focused Therapy to improve functioning, maintain stability, and avoid decompensation and the need for higher level of care.     Return in about 4 weeks, or earlier if symptoms worsen or fail to improve.           VISIT DIAGNOSIS:     ICD-10-CM ICD-9-CM   1. Other reactions to severe stress  F43.8 309.89             This document has been electronically signed by Zehra Quigley LCSW  June 14, 2021 09:16 EDT      Part of this note may be an electronic transcription/translation of spoken language to printed text using the Dragon Dictation System.

## 2021-07-27 DIAGNOSIS — Z72.0 TOBACCO ABUSE: ICD-10-CM

## 2021-07-27 RX ORDER — NICOTINE 21 MG/24HR
1 PATCH, TRANSDERMAL 24 HOURS TRANSDERMAL EVERY 24 HOURS
Qty: 28 EACH | Refills: 5 | Status: SHIPPED | OUTPATIENT
Start: 2021-07-27

## 2021-09-08 ENCOUNTER — OFFICE VISIT (OUTPATIENT)
Dept: PSYCHIATRY | Facility: CLINIC | Age: 31
End: 2021-09-08

## 2021-09-08 DIAGNOSIS — F43.89 OTHER REACTIONS TO SEVERE STRESS: Primary | ICD-10-CM

## 2021-09-08 PROCEDURE — 90837 PSYTX W PT 60 MINUTES: CPT | Performed by: SOCIAL WORKER

## 2021-09-19 NOTE — PROGRESS NOTES
Date: September 8, 2021  Time In: 2:30 pm   Time Out: 3:30 pm       PROGRESS NOTE  Data:  Zeenat Murrieta is a 30 y.o. female who presents today for individual therapy session at [facility].      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 Patient and therapist completed letter in session. 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     Clinical Maneuvering/Intervention:    Assisted patient in processing above session content; acknowledged and normalized patient’s thoughts, feelings, and concerns.  Rationalized patient thought process regarding Stress.  Discussed triggers associated with patient's Stress.  Also discussed coping skills for patient to implement such as deep breathing, mindfulness, taking a walk.    Allowed patient to freely discuss issues without interruption or judgment. Provided safe, confidential environment to facilitate the development of positive therapeutic relationship and encourage open, honest communication. Assisted patient in identifying risk factors which would indicate the need for higher level of care including thoughts to harm self or others and/or self-harming behavior and encouraged patient to contact this office, call 911, or present to the nearest emergency room should any of these events occur. Discussed crisis intervention services and means to access. Patient adamantly and convincingly denies current suicidal or homicidal ideation or perceptual disturbance.    Assessment   Patient appears to maintain relative stability as compared to their baseline.  However, patient continues to struggle with deep breathing which continues to cause impairment in important areas of functioning.  A result, they can be reasonably expected to continue to benefit from treatment and would likely be at increased risk for decompensation  otherwise.    Mental Status Exam:   Hygiene:   good  Cooperation:  Cooperative  Eye Contact:  Good  Psychomotor Behavior:  Appropriate  Affect:  Full range  Mood: normal  Speech:  Normal  Thought Process:  Goal directed  Thought Content:  Normal  Suicidal:  None  Homicidal:  None  Hallucinations:  None  Delusion:  None  Memory:  Intact  Orientation:  Person, Place, Time and Situation  Reliability:  good  Insight:  Good  Judgement:  Good  Impulse Control:  Good  Physical/Medical Issues:  No          Patient's Support Network Includes:  significant other and mother    Functional Status: Mild impairment     Progress toward goal: At goal    Prognosis: Good with Ongoing Treatment       Patient will continue in individual outpatient therapy with focus on improved functioning and coping skills, maintaining stability, and avoiding decompensation and the need for higher level of care.    Patient will adhere to medication regimen as prescribed and report any side effects. Patient will contact this office, call 911 or present to the nearest emergency room should suicidal or homicidal ideations occur. Provide Cognitive Behavioral Therapy and Solution Focused Therapy to improve functioning, maintain stability, and avoid decompensation and the need for higher level of care.     Return in about 4 weeks, or earlier if symptoms worsen or fail to improve.           VISIT DIAGNOSIS:     ICD-10-CM ICD-9-CM   1. Other reactions to severe stress  F43.8 309.89             This document has been electronically signed by Zehra Quigley LCSW  June 14, 2021 09:16 EDT      Part of this note may be an electronic transcription/translation of spoken language to printed text using the Dragon Dictation System.

## 2021-10-13 ENCOUNTER — OFFICE VISIT (OUTPATIENT)
Dept: PSYCHIATRY | Facility: CLINIC | Age: 31
End: 2021-10-13

## 2021-10-13 DIAGNOSIS — F43.89 OTHER REACTIONS TO SEVERE STRESS: Primary | ICD-10-CM

## 2025-07-22 NOTE — PROGRESS NOTES
Date: October 13, 2021  Time In: 2:30 pm   Time Out: 3:30 pm       PROGRESS NOTE  Data:  Zeenat Murrieta is a 30 y.o. female who presents today for individual therapy session at [facility].      Patient states that when she was 5 or 6 her mother began drinking alcohol often and taking barbiturates.  Patient states that when her mother realized that this was difficult for her to control she took her to her maternal grandmother's home.  Patient tells me that he did not meet her father until she was 11 and he has been emotionally distant throughout her life.  Patient tells me that she discovered that her father lived down the street from her and knew where she was throughout her entire life.  Patient states that this is a because of her fear of abandonment that she will struggle to feel that she is loved and trust that others will stay in her life due to her father knowing of her whereabouts until she was 11 and never introducing himself.  Patient reflects on her current relationship with therapist stating that when her partner is gone she becomes anxious and that she often feels that he will leave her.  Patient states that she avoids to become too emotionally vulnerable or close to her partner due to feeling that he will leave.    Clinical Maneuvering/Intervention:    Assisted patient in processing above session content; acknowledged and normalized patient’s thoughts, feelings, and concerns.  Rationalized patient thought process regarding Stress.  Discussed triggers associated with patient's Stress.  Also discussed coping skills for patient to implement such as deep breathing, mindfulness, taking a walk.    Allowed patient to freely discuss issues without interruption or judgment. Provided safe, confidential environment to facilitate the development of positive therapeutic relationship and encourage open, honest communication. Assisted patient in identifying risk factors which would indicate the  need for higher level of care including thoughts to harm self or others and/or self-harming behavior and encouraged patient to contact this office, call 911, or present to the nearest emergency room should any of these events occur. Discussed crisis intervention services and means to access. Patient adamantly and convincingly denies current suicidal or homicidal ideation or perceptual disturbance.    Assessment   Patient appears to maintain relative stability as compared to their baseline.  However, patient continues to struggle with deep breathing which continues to cause impairment in important areas of functioning.  A result, they can be reasonably expected to continue to benefit from treatment and would likely be at increased risk for decompensation otherwise.    Mental Status Exam:   Hygiene:   good  Cooperation:  Cooperative  Eye Contact:  Good  Psychomotor Behavior:  Appropriate  Affect:  Full range  Mood: normal  Speech:  Normal  Thought Process:  Goal directed  Thought Content:  Normal  Suicidal:  None  Homicidal:  None  Hallucinations:  None  Delusion:  None  Memory:  Intact  Orientation:  Person, Place, Time and Situation  Reliability:  good  Insight:  Good  Judgement:  Good  Impulse Control:  Good  Physical/Medical Issues:  No          Patient's Support Network Includes:  significant other and mother    Functional Status: Mild impairment     Progress toward goal: At goal    Prognosis: Good with Ongoing Treatment       Patient will continue in individual outpatient therapy with focus on improved functioning and coping skills, maintaining stability, and avoiding decompensation and the need for higher level of care.    Patient will adhere to medication regimen as prescribed and report any side effects. Patient will contact this office, call 911 or present to the nearest emergency room should suicidal or homicidal ideations occur. Provide Cognitive Behavioral Therapy and Solution Focused Therapy to improve  functioning, maintain stability, and avoid decompensation and the need for higher level of care.     Return in about 4 weeks, or earlier if symptoms worsen or fail to improve.           VISIT DIAGNOSIS:     ICD-10-CM ICD-9-CM   1. Other reactions to severe stress  F43.8 309.89             This document has been electronically signed by Zehra Quigley LCSW  June 14, 2021 09:16 EDT      Part of this note may be an electronic transcription/translation of spoken language to printed text using the Dragon Dictation System.     Opt out